# Patient Record
Sex: FEMALE | Race: WHITE | URBAN - METROPOLITAN AREA
[De-identification: names, ages, dates, MRNs, and addresses within clinical notes are randomized per-mention and may not be internally consistent; named-entity substitution may affect disease eponyms.]

---

## 2021-12-14 ENCOUNTER — EMERGENCY (EMERGENCY)
Facility: HOSPITAL | Age: 86
LOS: 0 days | Discharge: HOME | End: 2021-12-15
Attending: EMERGENCY MEDICINE | Admitting: EMERGENCY MEDICINE
Payer: MEDICARE

## 2021-12-14 VITALS
TEMPERATURE: 96 F | HEART RATE: 75 BPM | SYSTOLIC BLOOD PRESSURE: 204 MMHG | DIASTOLIC BLOOD PRESSURE: 77 MMHG | RESPIRATION RATE: 16 BRPM | OXYGEN SATURATION: 100 %

## 2021-12-14 DIAGNOSIS — D64.9 ANEMIA, UNSPECIFIED: ICD-10-CM

## 2021-12-14 DIAGNOSIS — R53.83 OTHER FATIGUE: ICD-10-CM

## 2021-12-14 DIAGNOSIS — H11.139 CONJUNCTIVAL PIGMENTATIONS, UNSPECIFIED EYE: ICD-10-CM

## 2021-12-14 DIAGNOSIS — R53.1 WEAKNESS: ICD-10-CM

## 2021-12-14 LAB
ABO RH CONFIRMATION: SIGNIFICANT CHANGE UP
ALBUMIN SERPL ELPH-MCNC: 3.3 G/DL — LOW (ref 3.5–5.2)
ALP SERPL-CCNC: 91 U/L — SIGNIFICANT CHANGE UP (ref 30–115)
ALT FLD-CCNC: 18 U/L — SIGNIFICANT CHANGE UP (ref 0–41)
ANION GAP SERPL CALC-SCNC: 15 MMOL/L — HIGH (ref 7–14)
AST SERPL-CCNC: 21 U/L — SIGNIFICANT CHANGE UP (ref 0–41)
BILIRUB SERPL-MCNC: 0.4 MG/DL — SIGNIFICANT CHANGE UP (ref 0.2–1.2)
BLD GP AB SCN SERPL QL: SIGNIFICANT CHANGE UP
BUN SERPL-MCNC: 33 MG/DL — HIGH (ref 10–20)
CALCIUM SERPL-MCNC: 8.5 MG/DL — SIGNIFICANT CHANGE UP (ref 8.5–10.1)
CHLORIDE SERPL-SCNC: 106 MMOL/L — SIGNIFICANT CHANGE UP (ref 98–110)
CO2 SERPL-SCNC: 20 MMOL/L — SIGNIFICANT CHANGE UP (ref 17–32)
CREAT SERPL-MCNC: 0.6 MG/DL — LOW (ref 0.7–1.5)
GLUCOSE SERPL-MCNC: 107 MG/DL — HIGH (ref 70–99)
HCT VFR BLD CALC: 24.6 % — LOW (ref 37–47)
HGB BLD-MCNC: 7.6 G/DL — LOW (ref 12–16)
MCHC RBC-ENTMCNC: 30.9 G/DL — LOW (ref 32–37)
MCHC RBC-ENTMCNC: 33.3 PG — HIGH (ref 27–31)
MCV RBC AUTO: 107.9 FL — HIGH (ref 81–99)
NRBC # BLD: 0 /100 WBCS — SIGNIFICANT CHANGE UP (ref 0–0)
PLATELET # BLD AUTO: 294 K/UL — SIGNIFICANT CHANGE UP (ref 130–400)
POTASSIUM SERPL-MCNC: 3.5 MMOL/L — SIGNIFICANT CHANGE UP (ref 3.5–5)
POTASSIUM SERPL-SCNC: 3.5 MMOL/L — SIGNIFICANT CHANGE UP (ref 3.5–5)
PROT SERPL-MCNC: 6.4 G/DL — SIGNIFICANT CHANGE UP (ref 6–8)
RBC # BLD: 2.28 M/UL — LOW (ref 4.2–5.4)
RBC # FLD: 18.4 % — HIGH (ref 11.5–14.5)
SODIUM SERPL-SCNC: 141 MMOL/L — SIGNIFICANT CHANGE UP (ref 135–146)
WBC # BLD: 5.14 K/UL — SIGNIFICANT CHANGE UP (ref 4.8–10.8)
WBC # FLD AUTO: 5.14 K/UL — SIGNIFICANT CHANGE UP (ref 4.8–10.8)

## 2021-12-14 PROCEDURE — 93010 ELECTROCARDIOGRAM REPORT: CPT

## 2021-12-14 PROCEDURE — 99285 EMERGENCY DEPT VISIT HI MDM: CPT

## 2021-12-14 NOTE — ED PROVIDER NOTE - PROGRESS NOTE DETAILS
Pt feeling much better - ambulating well in ed. eager to go home - discussed all results with pt. strict return precautions given, will follow up with pmd.

## 2021-12-14 NOTE — ED ADULT NURSE REASSESSMENT NOTE - NS ED NURSE REASSESS COMMENT FT1
Pt received from previous RN, no acute signs of distress pt is resting in bed m/a on call bell in reach awaiting blood transfusion

## 2021-12-14 NOTE — ED PROVIDER NOTE - NS ED ROS FT
Review of Systems:  	•	CONSTITUTIONAL - no fever, no diaphoresis, no chills  	•	SKIN - no rash  	•	HEMATOLOGIC - no bleeding, no bruising  	•	EYES - no eye pain, no blurry vision  	•	ENT - no change in hearing, no sore throat, no ear pain or tinnitus  	•	RESPIRATORY - no shortness of breath, no cough  	•	CARDIAC - no chest pain, no palpitations  	•	GI - no abd pain, no nausea, no vomiting, no diarrhea, no constipation  	•	GENITO-URINARY - no discharge, no dysuria; no hematuria, no increased urinary frequency  	•	MUSCULOSKELETAL - no joint paint, no swelling, no redness  	•	NEUROLOGIC - + weakness, no headache, no paresthesias, no LOC

## 2021-12-14 NOTE — ED PROVIDER NOTE - PATIENT PORTAL LINK FT
You can access the FollowMyHealth Patient Portal offered by [facility] by registering at the following website: [email or web address]. By joining [employer]’s FollowMyHealth portal, you will also be able to view your health information using other applications (apps) compatible with our system.

## 2021-12-14 NOTE — ED PROVIDER NOTE - OBJECTIVE STATEMENT
89 year old female with pmhx of chronic anemia presents for low Hb. pt had routine labs done and hb was 6.8. pt complaining of fatigue. no chest pain, sob, abd pain, nausea, vomiting, diarrhea, or bleeding.

## 2021-12-14 NOTE — ED PROVIDER NOTE - CLINICAL SUMMARY MEDICAL DECISION MAKING FREE TEXT BOX
89-year-old female presents to the ED for low hemoglobin and weakness.  Patient is a history of chronic anemia with a previous transfusion in July.  Patient denies any bloody stools, chest pain, shortness of breath.  Physical exam noted to have conjunctival pallor.  Obtain labs with hemoglobin of 7.9.  Transfused 1 unit PRBC.  Patient is well-appearing after transfusion, lungs clear bilaterally.  Discharged home.

## 2021-12-15 VITALS
OXYGEN SATURATION: 98 % | HEART RATE: 55 BPM | DIASTOLIC BLOOD PRESSURE: 76 MMHG | RESPIRATION RATE: 19 BRPM | TEMPERATURE: 97 F | SYSTOLIC BLOOD PRESSURE: 172 MMHG

## 2022-04-13 NOTE — ED PROVIDER NOTE - NS ED MD EM SELECTION
[id number] Detailed Propranolol Counseling:  I discussed with the patient the risks of propranolol including but not limited to low heart rate, low blood pressure, low blood sugar, restlessness and increased cold sensitivity. They should call the office if they experience any of these side effects.

## 2022-08-19 ENCOUNTER — EMERGENCY (EMERGENCY)
Facility: HOSPITAL | Age: 87
LOS: 0 days | Discharge: HOME | End: 2022-08-20
Attending: EMERGENCY MEDICINE | Admitting: EMERGENCY MEDICINE

## 2022-08-19 VITALS
RESPIRATION RATE: 20 BRPM | OXYGEN SATURATION: 96 % | DIASTOLIC BLOOD PRESSURE: 56 MMHG | TEMPERATURE: 98 F | HEART RATE: 77 BPM | SYSTOLIC BLOOD PRESSURE: 133 MMHG

## 2022-08-19 DIAGNOSIS — D64.9 ANEMIA, UNSPECIFIED: ICD-10-CM

## 2022-08-19 DIAGNOSIS — Z20.822 CONTACT WITH AND (SUSPECTED) EXPOSURE TO COVID-19: ICD-10-CM

## 2022-08-19 DIAGNOSIS — E78.5 HYPERLIPIDEMIA, UNSPECIFIED: ICD-10-CM

## 2022-08-19 DIAGNOSIS — I10 ESSENTIAL (PRIMARY) HYPERTENSION: ICD-10-CM

## 2022-08-19 LAB
ANION GAP SERPL CALC-SCNC: 10 MMOL/L — SIGNIFICANT CHANGE UP (ref 7–14)
ANISOCYTOSIS BLD QL: SIGNIFICANT CHANGE UP
APTT BLD: 34.7 SEC — SIGNIFICANT CHANGE UP (ref 27–39.2)
BASOPHILS # BLD AUTO: 0 K/UL — SIGNIFICANT CHANGE UP (ref 0–0.2)
BASOPHILS NFR BLD AUTO: 0 % — SIGNIFICANT CHANGE UP (ref 0–1)
BLD GP AB SCN SERPL QL: SIGNIFICANT CHANGE UP
BUN SERPL-MCNC: 27 MG/DL — HIGH (ref 10–20)
CALCIUM SERPL-MCNC: 8.1 MG/DL — LOW (ref 8.5–10.1)
CHLORIDE SERPL-SCNC: 109 MMOL/L — SIGNIFICANT CHANGE UP (ref 98–110)
CO2 SERPL-SCNC: 21 MMOL/L — SIGNIFICANT CHANGE UP (ref 17–32)
CREAT SERPL-MCNC: 1 MG/DL — SIGNIFICANT CHANGE UP (ref 0.7–1.5)
EGFR: 51 ML/MIN/1.73M2 — LOW
ELLIPTOCYTES BLD QL SMEAR: SLIGHT — SIGNIFICANT CHANGE UP
EOSINOPHIL # BLD AUTO: 0.04 K/UL — SIGNIFICANT CHANGE UP (ref 0–0.7)
EOSINOPHIL NFR BLD AUTO: 0.8 % — SIGNIFICANT CHANGE UP (ref 0–8)
GIANT PLATELETS BLD QL SMEAR: PRESENT — SIGNIFICANT CHANGE UP
GLUCOSE SERPL-MCNC: 95 MG/DL — SIGNIFICANT CHANGE UP (ref 70–99)
HCT VFR BLD CALC: 18.4 % — LOW (ref 37–47)
HGB BLD-MCNC: 5.9 G/DL — CRITICAL LOW (ref 12–16)
HYPOCHROMIA BLD QL: SIGNIFICANT CHANGE UP
INR BLD: 1.31 RATIO — HIGH (ref 0.65–1.3)
LYMPHOCYTES # BLD AUTO: 3.33 K/UL — SIGNIFICANT CHANGE UP (ref 1.2–3.4)
LYMPHOCYTES # BLD AUTO: 63.5 % — HIGH (ref 20.5–51.1)
MACROCYTES BLD QL: SIGNIFICANT CHANGE UP
MANUAL SMEAR VERIFICATION: SIGNIFICANT CHANGE UP
MCHC RBC-ENTMCNC: 32.1 G/DL — SIGNIFICANT CHANGE UP (ref 32–37)
MCHC RBC-ENTMCNC: 35.5 PG — HIGH (ref 27–31)
MCV RBC AUTO: 110.8 FL — HIGH (ref 81–99)
MONOCYTES # BLD AUTO: 0.05 K/UL — LOW (ref 0.1–0.6)
MONOCYTES NFR BLD AUTO: 0.9 % — LOW (ref 1.7–9.3)
NEUTROPHILS # BLD AUTO: 1.82 K/UL — SIGNIFICANT CHANGE UP (ref 1.4–6.5)
NEUTROPHILS NFR BLD AUTO: 34.8 % — LOW (ref 42.2–75.2)
OVALOCYTES BLD QL SMEAR: SLIGHT — SIGNIFICANT CHANGE UP
PLAT MORPH BLD: NORMAL — SIGNIFICANT CHANGE UP
PLATELET # BLD AUTO: 201 K/UL — SIGNIFICANT CHANGE UP (ref 130–400)
POIKILOCYTOSIS BLD QL AUTO: SIGNIFICANT CHANGE UP
POLYCHROMASIA BLD QL SMEAR: SIGNIFICANT CHANGE UP
POTASSIUM SERPL-MCNC: 3.8 MMOL/L — SIGNIFICANT CHANGE UP (ref 3.5–5)
POTASSIUM SERPL-SCNC: 3.8 MMOL/L — SIGNIFICANT CHANGE UP (ref 3.5–5)
PROTHROM AB SERPL-ACNC: 15 SEC — HIGH (ref 9.95–12.87)
RBC # BLD: 1.66 M/UL — LOW (ref 4.2–5.4)
RBC # FLD: 20.9 % — HIGH (ref 11.5–14.5)
RBC BLD AUTO: ABNORMAL
SCHISTOCYTES BLD QL AUTO: SLIGHT — SIGNIFICANT CHANGE UP
SODIUM SERPL-SCNC: 140 MMOL/L — SIGNIFICANT CHANGE UP (ref 135–146)
WBC # BLD: 5.24 K/UL — SIGNIFICANT CHANGE UP (ref 4.8–10.8)
WBC # FLD AUTO: 5.24 K/UL — SIGNIFICANT CHANGE UP (ref 4.8–10.8)

## 2022-08-19 PROCEDURE — 99285 EMERGENCY DEPT VISIT HI MDM: CPT

## 2022-08-19 NOTE — ED PROVIDER NOTE - CLINICAL SUMMARY MEDICAL DECISION MAKING FREE TEXT BOX
Patient evaluated for chronic anemia, Hb < 6, PRBCs ordered and patient placed in EDOU under transfusion protocol &  further evaluation.

## 2022-08-19 NOTE — ED ADULT TRIAGE NOTE - CHIEF COMPLAINT QUOTE
Pt send from [facility] for Low H/H of -5.5,pt denies fever ,denies chills N/V/D ,

## 2022-08-19 NOTE — ED PROVIDER NOTE - PROGRESS NOTE DETAILS
spoke with pt's nephew (healthcare proxy) aware she is in ED and confirms hx of anemmia with previous transfusions. Plan is for 2 units and repeat CBC later in am

## 2022-08-19 NOTE — ED PROVIDER NOTE - PHYSICAL EXAMINATION
VITAL SIGNS: I have reviewed nursing notes and confirm.  CONSTITUTIONAL: cachectic, chronically ill-appearing   SKIN: skin exam is warm and dry, no acute rash.    HEAD: Normocephalic; atraumatic.  EYES: conjunctiva and sclera clear.  ENT: No nasal discharge; airway clear.  NECK: Supple; non tender.  CARD: S1, S2 normal; no murmurs, gallops, or rubs. Regular rate and rhythm.   RESP: No wheezes, rales or rhonchi.  ABD: Normal bowel sounds; soft; non-distended; non-tender  EXT: Normal ROM.  No clubbing, cyanosis or edema.   NEURO: Alert, oriented, grossly unremarkable

## 2022-08-19 NOTE — ED PROVIDER NOTE - NS ED ROS FT
Cardiac:  No chest pain, SOB  Respiratory:  No cough or respiratory distress. No hemoptysis. No history of asthma or RAD.  GI:  No nausea, vomiting, diarrhea or abdominal pain.  :  No dysuria, frequency or burning.  MS:  No myalgia, muscle weakness, joint pain or back pain.  Neuro:  No headache or weakness.  No LOC.  Skin:  No skin rash.   Endocrine: No history of thyroid disease or diabetes.  Except as documented in the HPI,  all other systems are negative.

## 2022-08-19 NOTE — ED PROVIDER NOTE - NS ED ATTENDING STATEMENT MOD
This was a shared visit with the ANAHY. I reviewed and verified the documentation and independently performed the documented:

## 2022-08-19 NOTE — ED PROVIDER NOTE - ATTENDING APP SHARED VISIT CONTRIBUTION OF CARE
89-year-old female with PMH chronic anemia with history of transfusion sent from nursing home for transfusion for low hemoglobin.  Patient denies any complaints.  Reports feeling comfortable and without any pain.  No SOB, CP, dizziness, cough, V/D, fevers or chills.    VITAL SIGNS: noted  CONSTITUTIONAL: Well-developed; elderly female, in no acute distress  HEAD: Normocephalic; atraumatic  EYES: PERRL, EOM intact; conjunctiva and sclera clear  ENT: No nasal discharge; airway clear. MMM  NECK: Supple; non tender.    CARD: S1, S2 normal; no murmurs, gallops, or rubs. Regular rate and rhythm  RESP: CTAB/L, no wheezes, rales or rhonchi  ABD: Normal bowel sounds; soft; non-distended; non-tender  EXT: Normal ROM. No calf tenderness or edema. Distal pulses intact  NEURO: Alert, oriented. Grossly unremarkable. No focal deficits  SKIN: Skin exam is warm and dry, no acute rash

## 2022-08-19 NOTE — ED PROVIDER NOTE - OBJECTIVE STATEMENT
Pt is a 89y/o female with a pmhx of HTN, HLD, Anemia with previous transfusions here for blood transfusion after today's labs revealed a hemoglobin of 5.5. Pt denies CP, SOB, Palpitations

## 2022-08-20 VITALS
TEMPERATURE: 98 F | HEART RATE: 58 BPM | RESPIRATION RATE: 18 BRPM | SYSTOLIC BLOOD PRESSURE: 155 MMHG | OXYGEN SATURATION: 98 % | DIASTOLIC BLOOD PRESSURE: 70 MMHG

## 2022-08-20 LAB
HCT VFR BLD CALC: 26.2 % — LOW (ref 37–47)
HGB BLD-MCNC: 8.7 G/DL — LOW (ref 12–16)
MCHC RBC-ENTMCNC: 32.7 PG — HIGH (ref 27–31)
MCHC RBC-ENTMCNC: 33.2 G/DL — SIGNIFICANT CHANGE UP (ref 32–37)
MCV RBC AUTO: 98.5 FL — SIGNIFICANT CHANGE UP (ref 81–99)
NRBC # BLD: 0 /100 WBCS — SIGNIFICANT CHANGE UP (ref 0–0)
PLATELET # BLD AUTO: 165 K/UL — SIGNIFICANT CHANGE UP (ref 130–400)
RBC # BLD: 2.66 M/UL — LOW (ref 4.2–5.4)
RBC # FLD: 22.7 % — HIGH (ref 11.5–14.5)
SARS-COV-2 RNA SPEC QL NAA+PROBE: SIGNIFICANT CHANGE UP
WBC # BLD: 5.12 K/UL — SIGNIFICANT CHANGE UP (ref 4.8–10.8)
WBC # FLD AUTO: 5.12 K/UL — SIGNIFICANT CHANGE UP (ref 4.8–10.8)

## 2022-08-20 PROCEDURE — 99218: CPT

## 2022-08-20 NOTE — ED CDU PROVIDER DISPOSITION NOTE - PATIENT PORTAL LINK FT
You can access the FollowMyHealth Patient Portal offered by [facility] by registering at the following website: [email or web address]. By joining [employer]’s FollowMyHealth portal, you will also be able to view your health information using other applications (apps) compatible with our system.

## 2022-08-20 NOTE — ED CDU PROVIDER INITIAL DAY NOTE - PROGRESS NOTE DETAILS
received signout from Newton Byrne pt in obs for transfusion of blood; pt received 2 units; will repeat cbc and d/c if improved in am;

## 2022-08-20 NOTE — ED CDU PROVIDER DISPOSITION NOTE - NSFOLLOWUPINSTRUCTIONS_ED_ALL_ED_FT
Anemia    Anemia is a condition in which the concentration of red blood cells or hemoglobin in the blood is below normal. Hemoglobin is a substance in red blood cells that carries oxygen to the tissues of the body. Anemia results in not enough oxygen reaching these tissues which can cause symptoms such as weakness, dizziness/lightheadedness, shortness of breath, chest pain, paleness, or nausea.    SEEK IMMEDIATE MEDICAL CARE IF YOU HAVE THE FOLLOWING SYMPTOMS: extreme weakness/chest pain/shortness of breath, black or bloody stools, vomiting blood, fainting, fever, or any signs of dehydration.      Blood Transfusion    WHAT YOU NEED TO KNOW:    A blood transfusion is used to give you blood through an IV. You may get only part of the blood, such as red blood cells, platelets, or plasma. The blood may be from you and stored for you to use later. The blood may instead be from another person. Donated blood is tested for HIV, hepatitis, syphilis, West Nile virus, and other diseases.    DISCHARGE INSTRUCTIONS:    Call 911 for any of the following:     You have a skin rash, hives, swelling, or itching.       You have trouble breathing, shortness of breath, wheezing, or coughing.      Your throat tightens or your lips or tongue swell.      You have difficulty swallowing or speaking.    Seek care immediately if:     You develop a high fever and chills.       You are dizzy, lightheaded, confused, or feel like you are going to faint.      You have nausea, diarrhea, or abdominal cramps, or you are vomiting.      You urinate little or not at all.      You develop headaches or double vision.      Your skin or the whites of your eyes look yellow.      You see pinpoint purple spots or purple patches on your body.       You have a seizure.     Contact your healthcare provider if:     You feel tired and weak within 10 days of your transfusion.      You have questions or concerns about blood transfusions.    Medicines:     Antihistamines may help stop mild itching or a rash.      Epinephrine is emergency medicine used to stop anaphylaxis. You may be given epinephrine if you are at risk for anaphylaxis. Your healthcare provider will teach you how to use it.      Take your medicine as directed. Contact your healthcare provider if you think your medicine is not helping or if you have side effects. Tell him or her if you are allergic to any medicine. Keep a list of the medicines, vitamins, and herbs you take. Include the amounts, and when and why you take them. Bring the list or the pill bottles to follow-up visits. Carry your medicine list with you in case of an emergency.    Apply ice to decrease pain and swelling. Use an ice pack, or put ice in a plastic bag and wrap a towel around it. Apply the ice pack or wrapped bag to your transfusion site for 20 minutes each hour or as directed.     Follow up with your healthcare provider as directed: Write down your questions so you remember to ask them during your visits.       © Copyright [employer] 2019 All illustrations and images included in CareNotes are the copyrighted property of [employer]M., Inc[employer] or [employer].

## 2022-08-20 NOTE — ED CDU PROVIDER DISPOSITION NOTE - CARE PROVIDER_API CALL
Tang Vargas (DO)  Internal Medicine  [address] [place] [address]  Phone: [phone]  Fax: [phone]  Established Patient  Follow Up Time: 4-6 Days

## 2022-08-20 NOTE — ED CDU PROVIDER DISPOSITION NOTE - CLINICAL COURSE
Patient evaluated for anemia, placed in EOU for transfusion PRBCs.  Received PRBC transfusion without issue.  Patient without any complaints.  Advised close follow-up with PMD, strict return precautions advised. Patient evaluated for anemia, placed in EDOU for transfusion PRBCs.  Received PRBC transfusion without issue.  Patient without any complaints.  Advised close follow-up with PMD, strict return precautions advised.

## 2022-09-04 ENCOUNTER — INPATIENT (INPATIENT)
Facility: HOSPITAL | Age: 87
LOS: 1 days | Discharge: NURSING, REHAB OR LONG-TERM CARE | End: 2022-09-06
Attending: HOSPITALIST | Admitting: HOSPITALIST

## 2022-09-04 VITALS
TEMPERATURE: 98 F | HEIGHT: 61 IN | RESPIRATION RATE: 18 BRPM | SYSTOLIC BLOOD PRESSURE: 134 MMHG | DIASTOLIC BLOOD PRESSURE: 58 MMHG | HEART RATE: 62 BPM | OXYGEN SATURATION: 96 % | WEIGHT: 115.08 LBS

## 2022-09-04 LAB
ALBUMIN SERPL ELPH-MCNC: 2.7 G/DL — LOW (ref 3.5–5.2)
ALP SERPL-CCNC: 71 U/L — SIGNIFICANT CHANGE UP (ref 30–115)
ALT FLD-CCNC: 17 U/L — SIGNIFICANT CHANGE UP (ref 0–41)
ANION GAP SERPL CALC-SCNC: 7 MMOL/L — SIGNIFICANT CHANGE UP (ref 7–14)
ANISOCYTOSIS BLD QL: SIGNIFICANT CHANGE UP
APPEARANCE UR: CLEAR — SIGNIFICANT CHANGE UP
APTT BLD: 25 SEC — LOW (ref 27–39.2)
AST SERPL-CCNC: 26 U/L — SIGNIFICANT CHANGE UP (ref 0–41)
BASOPHILS # BLD AUTO: 0 K/UL — SIGNIFICANT CHANGE UP (ref 0–0.2)
BASOPHILS NFR BLD AUTO: 0 % — SIGNIFICANT CHANGE UP (ref 0–1)
BILIRUB SERPL-MCNC: 0.7 MG/DL — SIGNIFICANT CHANGE UP (ref 0.2–1.2)
BILIRUB UR-MCNC: NEGATIVE — SIGNIFICANT CHANGE UP
BLD GP AB SCN SERPL QL: SIGNIFICANT CHANGE UP
BUN SERPL-MCNC: 22 MG/DL — HIGH (ref 10–20)
CALCIUM SERPL-MCNC: 8.1 MG/DL — LOW (ref 8.5–10.1)
CHLORIDE SERPL-SCNC: 107 MMOL/L — SIGNIFICANT CHANGE UP (ref 98–110)
CO2 SERPL-SCNC: 22 MMOL/L — SIGNIFICANT CHANGE UP (ref 17–32)
COLOR SPEC: YELLOW — SIGNIFICANT CHANGE UP
CREAT SERPL-MCNC: 0.5 MG/DL — LOW (ref 0.7–1.5)
DIFF PNL FLD: NEGATIVE — SIGNIFICANT CHANGE UP
EGFR: 85 ML/MIN/1.73M2 — SIGNIFICANT CHANGE UP
ELLIPTOCYTES BLD QL SMEAR: SLIGHT — SIGNIFICANT CHANGE UP
EOSINOPHIL # BLD AUTO: 0.05 K/UL — SIGNIFICANT CHANGE UP (ref 0–0.7)
EOSINOPHIL NFR BLD AUTO: 0.9 % — SIGNIFICANT CHANGE UP (ref 0–8)
GIANT PLATELETS BLD QL SMEAR: PRESENT — SIGNIFICANT CHANGE UP
GLUCOSE SERPL-MCNC: 87 MG/DL — SIGNIFICANT CHANGE UP (ref 70–99)
GLUCOSE UR QL: NEGATIVE — SIGNIFICANT CHANGE UP
HCT VFR BLD CALC: 22.1 % — LOW (ref 37–47)
HGB BLD-MCNC: 7.2 G/DL — LOW (ref 12–16)
INR BLD: 1.33 RATIO — HIGH (ref 0.65–1.3)
KETONES UR-MCNC: NEGATIVE — SIGNIFICANT CHANGE UP
LACTATE SERPL-SCNC: 1.9 MMOL/L — SIGNIFICANT CHANGE UP (ref 0.7–2)
LEUKOCYTE ESTERASE UR-ACNC: NEGATIVE — SIGNIFICANT CHANGE UP
LYMPHOCYTES # BLD AUTO: 1.73 K/UL — SIGNIFICANT CHANGE UP (ref 1.2–3.4)
LYMPHOCYTES # BLD AUTO: 31.3 % — SIGNIFICANT CHANGE UP (ref 20.5–51.1)
MACROCYTES BLD QL: SLIGHT — SIGNIFICANT CHANGE UP
MANUAL SMEAR VERIFICATION: SIGNIFICANT CHANGE UP
MCHC RBC-ENTMCNC: 32.6 G/DL — SIGNIFICANT CHANGE UP (ref 32–37)
MCHC RBC-ENTMCNC: 33.5 PG — HIGH (ref 27–31)
MCV RBC AUTO: 102.8 FL — HIGH (ref 81–99)
METAMYELOCYTES # FLD: 22.6 % — HIGH (ref 0–0)
MONOCYTES # BLD AUTO: 0.53 K/UL — SIGNIFICANT CHANGE UP (ref 0.1–0.6)
MONOCYTES NFR BLD AUTO: 9.5 % — HIGH (ref 1.7–9.3)
NEUTROPHILS # BLD AUTO: 1.92 K/UL — SIGNIFICANT CHANGE UP (ref 1.4–6.5)
NEUTROPHILS NFR BLD AUTO: 23.5 % — LOW (ref 42.2–75.2)
NEUTS BAND # BLD: 11.3 % — HIGH (ref 0–6)
NITRITE UR-MCNC: NEGATIVE — SIGNIFICANT CHANGE UP
OVALOCYTES BLD QL SMEAR: SLIGHT — SIGNIFICANT CHANGE UP
PH UR: 6 — SIGNIFICANT CHANGE UP (ref 5–8)
PLAT MORPH BLD: NORMAL — SIGNIFICANT CHANGE UP
PLATELET # BLD AUTO: 209 K/UL — SIGNIFICANT CHANGE UP (ref 130–400)
POIKILOCYTOSIS BLD QL AUTO: SLIGHT — SIGNIFICANT CHANGE UP
POLYCHROMASIA BLD QL SMEAR: SLIGHT — SIGNIFICANT CHANGE UP
POTASSIUM SERPL-MCNC: 4.7 MMOL/L — SIGNIFICANT CHANGE UP (ref 3.5–5)
POTASSIUM SERPL-SCNC: 4.7 MMOL/L — SIGNIFICANT CHANGE UP (ref 3.5–5)
PROT SERPL-MCNC: 6.5 G/DL — SIGNIFICANT CHANGE UP (ref 6–8)
PROT UR-MCNC: SIGNIFICANT CHANGE UP
PROTHROM AB SERPL-ACNC: 15.2 SEC — HIGH (ref 9.95–12.87)
RBC # BLD: 2.15 M/UL — LOW (ref 4.2–5.4)
RBC # FLD: 22.3 % — HIGH (ref 11.5–14.5)
RBC BLD AUTO: ABNORMAL
SARS-COV-2 RNA SPEC QL NAA+PROBE: SIGNIFICANT CHANGE UP
SMUDGE CELLS # BLD: PRESENT — SIGNIFICANT CHANGE UP
SODIUM SERPL-SCNC: 136 MMOL/L — SIGNIFICANT CHANGE UP (ref 135–146)
SP GR SPEC: 1.02 — SIGNIFICANT CHANGE UP (ref 1.01–1.03)
TARGETS BLD QL SMEAR: SLIGHT — SIGNIFICANT CHANGE UP
UROBILINOGEN FLD QL: ABNORMAL
VARIANT LYMPHS # BLD: 0.9 % — SIGNIFICANT CHANGE UP (ref 0–5)
WBC # BLD: 5.53 K/UL — SIGNIFICANT CHANGE UP (ref 4.8–10.8)
WBC # FLD AUTO: 5.53 K/UL — SIGNIFICANT CHANGE UP (ref 4.8–10.8)

## 2022-09-04 PROCEDURE — 99285 EMERGENCY DEPT VISIT HI MDM: CPT

## 2022-09-04 PROCEDURE — 71045 X-RAY EXAM CHEST 1 VIEW: CPT | Mod: 26

## 2022-09-04 PROCEDURE — 99223 1ST HOSP IP/OBS HIGH 75: CPT

## 2022-09-04 RX ORDER — GABAPENTIN 400 MG/1
100 CAPSULE ORAL THREE TIMES A DAY
Refills: 0 | Status: DISCONTINUED | OUTPATIENT
Start: 2022-09-04 | End: 2022-09-06

## 2022-09-04 RX ORDER — CALCIUM CARBONATE 500(1250)
1 TABLET ORAL DAILY
Refills: 0 | Status: DISCONTINUED | OUTPATIENT
Start: 2022-09-04 | End: 2022-09-06

## 2022-09-04 RX ORDER — ACETAMINOPHEN 500 MG
650 TABLET ORAL EVERY 6 HOURS
Refills: 0 | Status: DISCONTINUED | OUTPATIENT
Start: 2022-09-04 | End: 2022-09-06

## 2022-09-04 RX ORDER — ASCORBIC ACID 60 MG
500 TABLET,CHEWABLE ORAL DAILY
Refills: 0 | Status: DISCONTINUED | OUTPATIENT
Start: 2022-09-04 | End: 2022-09-06

## 2022-09-04 RX ORDER — LOSARTAN POTASSIUM 100 MG/1
50 TABLET, FILM COATED ORAL DAILY
Refills: 0 | Status: DISCONTINUED | OUTPATIENT
Start: 2022-09-04 | End: 2022-09-06

## 2022-09-04 RX ORDER — DORZOLAMIDE HYDROCHLORIDE 20 MG/ML
2 SOLUTION/ DROPS OPHTHALMIC THREE TIMES A DAY
Refills: 0 | Status: DISCONTINUED | OUTPATIENT
Start: 2022-09-04 | End: 2022-09-06

## 2022-09-04 RX ORDER — PANTOPRAZOLE SODIUM 20 MG/1
40 TABLET, DELAYED RELEASE ORAL
Refills: 0 | Status: DISCONTINUED | OUTPATIENT
Start: 2022-09-04 | End: 2022-09-06

## 2022-09-04 RX ORDER — SENNA PLUS 8.6 MG/1
1 TABLET ORAL AT BEDTIME
Refills: 0 | Status: DISCONTINUED | OUTPATIENT
Start: 2022-09-04 | End: 2022-09-06

## 2022-09-04 RX ORDER — ATORVASTATIN CALCIUM 80 MG/1
80 TABLET, FILM COATED ORAL AT BEDTIME
Refills: 0 | Status: DISCONTINUED | OUTPATIENT
Start: 2022-09-04 | End: 2022-09-06

## 2022-09-04 RX ORDER — LACTULOSE 10 G/15ML
10 SOLUTION ORAL DAILY
Refills: 0 | Status: DISCONTINUED | OUTPATIENT
Start: 2022-09-04 | End: 2022-09-06

## 2022-09-04 RX ORDER — POLYETHYLENE GLYCOL 3350 17 G/17G
17 POWDER, FOR SOLUTION ORAL DAILY
Refills: 0 | Status: DISCONTINUED | OUTPATIENT
Start: 2022-09-04 | End: 2022-09-06

## 2022-09-04 RX ORDER — FOLIC ACID 0.8 MG
1 TABLET ORAL DAILY
Refills: 0 | Status: DISCONTINUED | OUTPATIENT
Start: 2022-09-04 | End: 2022-09-06

## 2022-09-04 NOTE — ED PROVIDER NOTE - CLINICAL SUMMARY MEDICAL DECISION MAKING FREE TEXT BOX
Symptomatic anemia with bandemia of 11%.  No signs of focal infection.  Pan culture and admit for surveillance.  Stable.

## 2022-09-04 NOTE — ED ADULT TRIAGE NOTE - CHIEF COMPLAINT QUOTE
Pt referred to ED for [facility] blood transfusion. Hgb is currently 6.8. Pt has no complaints. No external evidence of bleeding

## 2022-09-04 NOTE — H&P ADULT - ATTENDING COMMENTS
89 YO F with a PMH of chronic anemia on procrit s/p multiple transfusions, HTN, HLD, and GERD who was sent in from her NH ([place]) for eval of low Hgb on out-pt bloodwork. The pt denies all symptoms. No black/bloody stools, no hematuria, no excessive bruising, and no hematemesis. ROS is negative except as above.     In the ED, Hgb is 7.2.     FMHx: Reviewed, not relevant    Physical exam shows pt in NAD. VSS, afebrile, not hypoxic on RA. A&Ox3. Neuro exam without deficits, motor/sensory intact, no dysarthria, no facial asymmetry. Muscle strength/sensation intact. CTA B/L with no W/C/R. RRR, no M/G/R. ABD is soft and non-tender, normoactive BSs. LEs without swelling. No rashes. Labs and radiology as above.     Macrocytic anemia, slightly below baseline. Send B12/Folate levels. Anemia work-up. Send peripheral smear. FOBT. Active T&S. Replace PRN. Heme/Onc consult.     Hypoalbuminemia, from poor oral intake. Nutrition eval.     Hx of HTN, HLD, and GERD. Restart home meds, except as stated above. DVT PPX. Inform PCP of pt's admission to hospital. My note supersedes the residents note.     Date seen by Attendin22

## 2022-09-04 NOTE — ED ADULT NURSE NOTE - NSIMPLEMENTINTERV_GEN_ALL_ED
Implemented All Fall with Harm Risk Interventions:  [place] to call system. Call bell, personal items and telephone within reach. Instruct patient to call for assistance. Room bathroom lighting operational. Non-slip footwear when patient is off stretcher. Physically safe environment: no spills, clutter or unnecessary equipment. Stretcher in lowest position, wheels locked, appropriate side rails in place. Provide visual cue, wrist band, yellow gown, etc. Monitor gait and stability. Monitor for mental status changes and reorient to person, place, and time. Review medications for side effects contributing to fall risk. Reinforce activity limits and safety measures with patient and family. Provide visual clues: red socks.

## 2022-09-04 NOTE — H&P ADULT - NSHPLABSRESULTS_GEN_ALL_CORE
7.2    5.53  )-----------( 209      ( 04 Sep 2022 17:40 )             22.1           136  |  107  |  22<H>  ----------------------------<  87  4.7   |  22  |  0.5<L>    Ca    8.1<L>      04 Sep 2022 17:40    TPro  6.5  /  Alb  2.7<L>  /  TBili  0.7  /  DBili  x   /  AST  26  /  ALT  17  /  AlkPhos  71                Urinalysis Basic - ( 04 Sep 2022 22:14 )    Color: Yellow / Appearance: Clear / S.020 / pH: x  Gluc: x / Ketone: Negative  / Bili: Negative / Urobili: 3 mg/dL   Blood: x / Protein: Trace / Nitrite: Negative   Leuk Esterase: Negative / RBC: x / WBC x   Sq Epi: x / Non Sq Epi: x / Bacteria: x        PT/INR - ( 04 Sep 2022 21:00 )   PT: 15.20 sec;   INR: 1.33 ratio         PTT - ( 04 Sep 2022 21:00 )  PTT:25.0 sec    Lactate Trend   @ 21:00 Lactate:1.9             CAPILLARY BLOOD GLUCOSE

## 2022-09-04 NOTE — H&P ADULT - ASSESSMENT
#Anemia Blood loss vs hemolytic vs hypopliferative (myelodysplasia has bands in blood)  - H/H: on admission 7.2   - Iron profile  - B12 folate  - Hemolytic panel  - Indirect genesis, and stacy  - ABD US for hepatosplenomegaly   - FOBT  - trend CBC with diff  - maintain active T and S  - Transfuse prn (Hgb>8)   - Monitor for signs and symptoms of bleeding  - GI eval  - PPI BID  - NPO for now         DVT PPX: SCD for now  Diet: NPO for now  GI PPX: pantoprazole   Dispo: acute 89-year-old female with history of chronic anemia, HTN, HLD, GERD previous blood transfusions in the past presents for hemoglobin of 6.8 found on outpatient lab work from nursing home. Patient recently came to the ER was given a transfusion and sent home.  Patient does not have any complaints.  Denies any bleeding hematuria blood in stool abdominal pain nausea vomiting black or tarry stool dizziness lightheadedness syncope or shortness of breath.      #Anemia Blood loss vs hemolytic vs hypopliferative (myelodysplasia has bands in blood)  - H/H: on admission 7.2   - Iron profile  - B12 folate  - Hemolytic panel  - Indirect genesis, and stacy  - ABD US for hepatosplenomegaly   - FOBT  - trend CBC with diff  - maintain active T and S  - Transfuse prn (Hgb>8)   - Monitor for signs and symptoms of bleeding  - GI eval  - PPI BID  - NPO for now   - Peripheral smear    #Severe bandemia  - pan culture  - procal   - monitor off abx  - Heme onc consult if no apparent cause found to R/O MDS    #HLD  - lipitor 80 mg PO QD        DVT PPX: SCD for now  Diet: NPO for now  GI PPX: pantoprazole   Dispo: acute 89-year-old female with history of chronic anemia, HTN, HLD, GERD previous blood transfusions in the past presents for hemoglobin of 6.8 found on outpatient lab work from nursing home. Patient recently came to the ER was given a transfusion and sent home.  Patient does not have any complaints.  Denies any bleeding hematuria blood in stool abdominal pain nausea vomiting black or tarry stool dizziness lightheadedness syncope or shortness of breath.      #Anemia Blood loss vs hemolytic vs hypopliferative (myelodysplasia has bands in blood)  - H/H: on admission 7.2   - Iron profile  - B12 folate  - Hemolytic panel  - Indirect genesis, and stacy  - ABD US for hepatosplenomegaly   - FOBT  - trend CBC with diff  - maintain active T and S  - Transfuse prn (Hgb>8)   - Monitor for signs and symptoms of bleeding  - GI eval  - PPI BID  - NPO for now   - Peripheral smear    #Severe bandemia  - pan culture  - procal   - monitor off abx  - Heme onc consult if no apparent cause found to R/O MDS    #HLD  - lipitor 80 mg PO QD    Dietitian eval when stable has low Albumin      DVT PPX: SCD for now  Diet: NPO for now  GI PPX: pantoprazole   Dispo: acute 89-year-old female with history of chronic anemia, HTN, HLD, GERD previous blood transfusions in the past presents for hemoglobin of 6.8 found on outpatient lab work from nursing home. Patient recently came to the ER was given a transfusion and sent home.  Patient does not have any complaints.  Denies any bleeding hematuria blood in stool abdominal pain nausea vomiting black or tarry stool dizziness lightheadedness syncope or shortness of breath.      #Anemia Blood loss vs hemolytic vs hypopliferative (myelodysplasia has bands in blood)  - H/H: on admission 7.2   - Iron profile  - B12 folate  - Hemolytic panel  - Indirect genesis, and stacy  - ABD US for hepatosplenomegaly   - FOBT  - trend CBC with diff  - maintain active T and S  - Transfuse prn (Hgb>8)   - Monitor for signs and symptoms of bleeding  - GI eval  - PPI BID  - NPO for now   - Peripheral smear  - 2 large bores    #Severe bandemia  - pan culture  - procal   - monitor off abx  - Heme onc consult if no apparent cause found to R/O MDS    #HLD  - lipitor 80 mg PO QD    Dietitian eval when stable has low Albumin      DVT PPX: SCD for now  Diet: NPO for now  GI PPX: pantoprazole   Dispo: acute

## 2022-09-04 NOTE — ED PROVIDER NOTE - ATTENDING APP SHARED VISIT CONTRIBUTION OF CARE
89-year-old female with a past medical history of anemia requiring multiple transfusions, hypertension, hyperlipidemia sent in for transfusion given hemoglobin 6.8 at nursing home blood work.  Patient has absolutely no complaints and denies any bleeding history.  On exam nontoxic, vital signs stable, heart regular with systolic murmur, lungs clear bilaterally, no peripheral edema, hgb 7.2 here.  We will transfuse.  CBC otherwise with 11% bands.  No fever or signs of infection clinically but will admit for this for further monitoring.

## 2022-09-04 NOTE — ED PROVIDER NOTE - PHYSICAL EXAMINATION
CONST: Well appearing in NAD  EYES: PERRL, EOMI, Sclera and conjunctiva clear. Vision grossly intact  NECK: Non-tender, no meningeal signs  CARD: Normal S1 S2; Normal rate and rhythm  RESP: Equal BS B/L, No wheezes, rhonchi or rales. No distress  GI: Soft, non-tender, non-distended. No rebound or Guarding  MS: Normal ROM in all extremities. No midline spinal tenderness.  SKIN: Warm, dry, no acute rashes. Good turgor  NEURO: A&Ox3, No focal deficits. Strength 5/5 with no sensory deficits. Steady gait

## 2022-09-04 NOTE — H&P ADULT - NSHPPHYSICALEXAM_GEN_ALL_CORE
PHYSICAL EXAM:  GENERAL: NAD, lying in bed comfortably  HEAD:  Atraumatic, Normocephalic  EYES: EOMI, PERRLA, conjunctiva and sclera clear  ENT: Moist mucous membranes  NECK: Supple, No JVD  CHEST/LUNG: Clear to auscultation bilaterally; No rales, rhonchi, wheezing, or rubs. Unlabored respirations  HEART: Regular rate and rhythm; No murmurs, rubs, or gallops  ABDOMEN: Bowel sounds present; Soft, Nontender, Nondistended. No hepatomegally  EXTREMITIES:  2+ Peripheral Pulses, brisk capillary refill. No clubbing, cyanosis, or edema  NERVOUS SYSTEM:  Alert & Oriented X3, speech clear. No deficits   MSK: FROM all 4 extremities, full and equal strength  SKIN: No rashes or lesions PHYSICAL EXAM:  GENERAL: NAD, lying in bed comfortably  HEAD:  Atraumatic, Normocephalic  EYES: EOMI, PERRLA, conjunctiva and sclera clear  ENT: Moist mucous membranes  NECK: Supple, No JVD  CHEST/LUNG: Clear to auscultation bilaterally; No rales, rhonchi, wheezing, or rubs. Unlabored respirations  HEART: systolic murmur 4/6 radiates throughout the precordium  ABDOMEN: Bowel sounds present; Soft, Nontender, Nondistended. No hepatomegally  EXTREMITIES:  No clubbing, cyanosis, or edema  NERVOUS SYSTEM:  Alert & Oriented X3, speech clear. No deficits   MSK: FROM all 4 extremities, full and equal strength  did not want DELANO

## 2022-09-04 NOTE — H&P ADULT - HISTORY OF PRESENT ILLNESS
89-year-old female with history of chronic anemia, previous blood transfusions in the past presents for hemoglobin of 6.8 found on outpatient lab work from nursing home.  Patient with no complaints.  Denies any bleeding hematuria blood in stool abdominal pain nausea vomiting black or tarry stool dizziness lightheadedness syncope or shortness of breath 89-year-old female with history of chronic anemia on procrit, HTN, HLD, GERD previous blood transfusions in the past presents for hemoglobin of 6.8 found on outpatient lab work from nursing home. Patient recently came to the ER was given a transfusion and sent home.  Patient does not have any complaints.  Denies any bleeding hematuria blood in stool abdominal pain nausea vomiting black or tarry stool dizziness lightheadedness syncope or shortness of breath.    In the ED patient was found to have a Hgb of 7.2.

## 2022-09-04 NOTE — ED ADULT NURSE NOTE - CHIEF COMPLAINT QUOTE
Pt referred to ED for [facility] blood transfusion. Hgb is currently 6.8. Pt has no complaints. No external evidence of bleeding

## 2022-09-04 NOTE — ED PROVIDER NOTE - NS ED ROS FT
CONST: No fever, chills or bodyaches  EYES: No pain, redness, drainage or visual changes.  CARD: No chest pain, palpitations  RESP: No SOB, cough, hemoptysis. No hx of asthma or COPD  GI: No abdominal pain, N/V/D  : No urinary symptoms  MS: No joint pain, back pain or extremity pain/injury  SKIN: No rashes  NEURO: No headache, dizziness, paresthesias or LOC

## 2022-09-05 ENCOUNTER — TRANSCRIPTION ENCOUNTER (OUTPATIENT)
Age: 87
End: 2022-09-05

## 2022-09-05 LAB
ALBUMIN SERPL ELPH-MCNC: 2.5 G/DL — LOW (ref 3.5–5.2)
ALBUMIN SERPL ELPH-MCNC: 2.5 G/DL — LOW (ref 3.5–5.2)
ALBUMIN SERPL ELPH-MCNC: 2.6 G/DL — LOW (ref 3.5–5.2)
ALP SERPL-CCNC: 68 U/L — SIGNIFICANT CHANGE UP (ref 30–115)
ALP SERPL-CCNC: 74 U/L — SIGNIFICANT CHANGE UP (ref 30–115)
ALP SERPL-CCNC: 75 U/L — SIGNIFICANT CHANGE UP (ref 30–115)
ALT FLD-CCNC: 15 U/L — SIGNIFICANT CHANGE UP (ref 0–41)
ANION GAP SERPL CALC-SCNC: 6 MMOL/L — LOW (ref 7–14)
ANION GAP SERPL CALC-SCNC: 8 MMOL/L — SIGNIFICANT CHANGE UP (ref 7–14)
ANION GAP SERPL CALC-SCNC: 9 MMOL/L — SIGNIFICANT CHANGE UP (ref 7–14)
AST SERPL-CCNC: 16 U/L — SIGNIFICANT CHANGE UP (ref 0–41)
AST SERPL-CCNC: 17 U/L — SIGNIFICANT CHANGE UP (ref 0–41)
AST SERPL-CCNC: 23 U/L — SIGNIFICANT CHANGE UP (ref 0–41)
BASOPHILS # BLD AUTO: 0.01 K/UL — SIGNIFICANT CHANGE UP (ref 0–0.2)
BASOPHILS # BLD AUTO: 0.01 K/UL — SIGNIFICANT CHANGE UP (ref 0–0.2)
BASOPHILS NFR BLD AUTO: 0.2 % — SIGNIFICANT CHANGE UP (ref 0–1)
BASOPHILS NFR BLD AUTO: 0.2 % — SIGNIFICANT CHANGE UP (ref 0–1)
BILIRUB DIRECT SERPL-MCNC: 0.3 MG/DL — SIGNIFICANT CHANGE UP (ref 0–0.3)
BILIRUB SERPL-MCNC: 0.6 MG/DL — SIGNIFICANT CHANGE UP (ref 0.2–1.2)
BILIRUB SERPL-MCNC: 0.7 MG/DL — SIGNIFICANT CHANGE UP (ref 0.2–1.2)
BILIRUB SERPL-MCNC: 0.8 MG/DL — SIGNIFICANT CHANGE UP (ref 0.2–1.2)
BUN SERPL-MCNC: 19 MG/DL — SIGNIFICANT CHANGE UP (ref 10–20)
BUN SERPL-MCNC: 20 MG/DL — SIGNIFICANT CHANGE UP (ref 10–20)
BUN SERPL-MCNC: 22 MG/DL — HIGH (ref 10–20)
CALCIUM SERPL-MCNC: 7.4 MG/DL — LOW (ref 8.5–10.1)
CALCIUM SERPL-MCNC: 7.6 MG/DL — LOW (ref 8.5–10.1)
CALCIUM SERPL-MCNC: 7.7 MG/DL — LOW (ref 8.5–10.1)
CHLORIDE SERPL-SCNC: 108 MMOL/L — SIGNIFICANT CHANGE UP (ref 98–110)
CO2 SERPL-SCNC: 20 MMOL/L — SIGNIFICANT CHANGE UP (ref 17–32)
CO2 SERPL-SCNC: 22 MMOL/L — SIGNIFICANT CHANGE UP (ref 17–32)
CO2 SERPL-SCNC: 23 MMOL/L — SIGNIFICANT CHANGE UP (ref 17–32)
CREAT SERPL-MCNC: 0.5 MG/DL — LOW (ref 0.7–1.5)
CREAT SERPL-MCNC: 0.5 MG/DL — LOW (ref 0.7–1.5)
CREAT SERPL-MCNC: 0.6 MG/DL — LOW (ref 0.7–1.5)
DIR ANTIGLOB POLYSPECIFIC INTERPRETATION: SIGNIFICANT CHANGE UP
EGFR: 81 ML/MIN/1.73M2 — SIGNIFICANT CHANGE UP
EGFR: 85 ML/MIN/1.73M2 — SIGNIFICANT CHANGE UP
EGFR: 85 ML/MIN/1.73M2 — SIGNIFICANT CHANGE UP
EOSINOPHIL # BLD AUTO: 0.08 K/UL — SIGNIFICANT CHANGE UP (ref 0–0.7)
EOSINOPHIL # BLD AUTO: 0.1 K/UL — SIGNIFICANT CHANGE UP (ref 0–0.7)
EOSINOPHIL NFR BLD AUTO: 1.7 % — SIGNIFICANT CHANGE UP (ref 0–8)
EOSINOPHIL NFR BLD AUTO: 2 % — SIGNIFICANT CHANGE UP (ref 0–8)
FERRITIN SERPL-MCNC: 1575 NG/ML — HIGH (ref 15–150)
FOLATE SERPL-MCNC: >20 NG/ML — SIGNIFICANT CHANGE UP
GLUCOSE SERPL-MCNC: 106 MG/DL — HIGH (ref 70–99)
GLUCOSE SERPL-MCNC: 132 MG/DL — HIGH (ref 70–99)
GLUCOSE SERPL-MCNC: 87 MG/DL — SIGNIFICANT CHANGE UP (ref 70–99)
HAPTOGLOB SERPL-MCNC: 216 MG/DL — HIGH (ref 34–200)
HCT VFR BLD CALC: 24.1 % — LOW (ref 37–47)
HCT VFR BLD CALC: 24.4 % — LOW (ref 37–47)
HCT VFR BLD CALC: 25 % — LOW (ref 37–47)
HGB BLD-MCNC: 8 G/DL — LOW (ref 12–16)
IMM GRANULOCYTES NFR BLD AUTO: 1 % — HIGH (ref 0.1–0.3)
IMM GRANULOCYTES NFR BLD AUTO: 1.3 % — HIGH (ref 0.1–0.3)
IRON SATN MFR SERPL: 42 % — SIGNIFICANT CHANGE UP (ref 15–50)
IRON SATN MFR SERPL: 55 UG/DL — SIGNIFICANT CHANGE UP (ref 35–150)
LDH SERPL L TO P-CCNC: 164 — SIGNIFICANT CHANGE UP (ref 50–242)
LYMPHOCYTES # BLD AUTO: 1.88 K/UL — SIGNIFICANT CHANGE UP (ref 1.2–3.4)
LYMPHOCYTES # BLD AUTO: 2.07 K/UL — SIGNIFICANT CHANGE UP (ref 1.2–3.4)
LYMPHOCYTES # BLD AUTO: 40.4 % — SIGNIFICANT CHANGE UP (ref 20.5–51.1)
LYMPHOCYTES # BLD AUTO: 40.7 % — SIGNIFICANT CHANGE UP (ref 20.5–51.1)
MAGNESIUM SERPL-MCNC: 1.7 MG/DL — LOW (ref 1.8–2.4)
MCHC RBC-ENTMCNC: 31 PG — SIGNIFICANT CHANGE UP (ref 27–31)
MCHC RBC-ENTMCNC: 31.1 PG — HIGH (ref 27–31)
MCHC RBC-ENTMCNC: 31.7 PG — HIGH (ref 27–31)
MCHC RBC-ENTMCNC: 32 G/DL — SIGNIFICANT CHANGE UP (ref 32–37)
MCHC RBC-ENTMCNC: 32.8 G/DL — SIGNIFICANT CHANGE UP (ref 32–37)
MCHC RBC-ENTMCNC: 33.2 G/DL — SIGNIFICANT CHANGE UP (ref 32–37)
MCV RBC AUTO: 94.9 FL — SIGNIFICANT CHANGE UP (ref 81–99)
MCV RBC AUTO: 95.6 FL — SIGNIFICANT CHANGE UP (ref 81–99)
MCV RBC AUTO: 96.9 FL — SIGNIFICANT CHANGE UP (ref 81–99)
MONOCYTES # BLD AUTO: 0.45 K/UL — SIGNIFICANT CHANGE UP (ref 0.1–0.6)
MONOCYTES # BLD AUTO: 0.49 K/UL — SIGNIFICANT CHANGE UP (ref 0.1–0.6)
MONOCYTES NFR BLD AUTO: 10.6 % — HIGH (ref 1.7–9.3)
MONOCYTES NFR BLD AUTO: 8.8 % — SIGNIFICANT CHANGE UP (ref 1.7–9.3)
NEUTROPHILS # BLD AUTO: 2.1 K/UL — SIGNIFICANT CHANGE UP (ref 1.4–6.5)
NEUTROPHILS # BLD AUTO: 2.44 K/UL — SIGNIFICANT CHANGE UP (ref 1.4–6.5)
NEUTROPHILS NFR BLD AUTO: 45.5 % — SIGNIFICANT CHANGE UP (ref 42.2–75.2)
NEUTROPHILS NFR BLD AUTO: 47.6 % — SIGNIFICANT CHANGE UP (ref 42.2–75.2)
NRBC # BLD: 0 /100 WBCS — SIGNIFICANT CHANGE UP (ref 0–0)
PLATELET # BLD AUTO: 169 K/UL — SIGNIFICANT CHANGE UP (ref 130–400)
PLATELET # BLD AUTO: 177 K/UL — SIGNIFICANT CHANGE UP (ref 130–400)
PLATELET # BLD AUTO: 181 K/UL — SIGNIFICANT CHANGE UP (ref 130–400)
POTASSIUM SERPL-MCNC: 3.7 MMOL/L — SIGNIFICANT CHANGE UP (ref 3.5–5)
POTASSIUM SERPL-MCNC: 3.9 MMOL/L — SIGNIFICANT CHANGE UP (ref 3.5–5)
POTASSIUM SERPL-MCNC: 4.1 MMOL/L — SIGNIFICANT CHANGE UP (ref 3.5–5)
POTASSIUM SERPL-SCNC: 3.7 MMOL/L — SIGNIFICANT CHANGE UP (ref 3.5–5)
POTASSIUM SERPL-SCNC: 3.9 MMOL/L — SIGNIFICANT CHANGE UP (ref 3.5–5)
POTASSIUM SERPL-SCNC: 4.1 MMOL/L — SIGNIFICANT CHANGE UP (ref 3.5–5)
PROCALCITONIN SERPL-MCNC: 0.08 NG/ML — SIGNIFICANT CHANGE UP (ref 0.02–0.1)
PROT SERPL-MCNC: 5.9 G/DL — LOW (ref 6–8)
PROT SERPL-MCNC: 6.1 G/DL — SIGNIFICANT CHANGE UP (ref 6–8)
PROT SERPL-MCNC: 6.1 G/DL — SIGNIFICANT CHANGE UP (ref 6–8)
RBC # BLD: 2.52 M/UL — LOW (ref 4.2–5.4)
RBC # BLD: 2.57 M/UL — LOW (ref 4.2–5.4)
RBC # BLD: 2.58 M/UL — LOW (ref 4.2–5.4)
RBC # FLD: 22.5 % — HIGH (ref 11.5–14.5)
RBC # FLD: 22.7 % — HIGH (ref 11.5–14.5)
RBC # FLD: 23 % — HIGH (ref 11.5–14.5)
SARS-COV-2 RNA SPEC QL NAA+PROBE: SIGNIFICANT CHANGE UP
SODIUM SERPL-SCNC: 137 MMOL/L — SIGNIFICANT CHANGE UP (ref 135–146)
SODIUM SERPL-SCNC: 137 MMOL/L — SIGNIFICANT CHANGE UP (ref 135–146)
SODIUM SERPL-SCNC: 138 MMOL/L — SIGNIFICANT CHANGE UP (ref 135–146)
TIBC SERPL-MCNC: 131 UG/DL — LOW (ref 220–430)
TRANSFERRIN SERPL-MCNC: 101 MG/DL — LOW (ref 200–360)
UIBC SERPL-MCNC: 76 UG/DL — LOW (ref 110–370)
VIT B12 SERPL-MCNC: 1689 PG/ML — HIGH (ref 232–1245)
WBC # BLD: 4.28 K/UL — LOW (ref 4.8–10.8)
WBC # BLD: 4.62 K/UL — LOW (ref 4.8–10.8)
WBC # BLD: 5.12 K/UL — SIGNIFICANT CHANGE UP (ref 4.8–10.8)
WBC # FLD AUTO: 4.28 K/UL — LOW (ref 4.8–10.8)
WBC # FLD AUTO: 4.62 K/UL — LOW (ref 4.8–10.8)
WBC # FLD AUTO: 5.12 K/UL — SIGNIFICANT CHANGE UP (ref 4.8–10.8)

## 2022-09-05 PROCEDURE — 88189 FLOWCYTOMETRY/READ 16 & >: CPT

## 2022-09-05 PROCEDURE — 76700 US EXAM ABDOM COMPLETE: CPT | Mod: 26

## 2022-09-05 PROCEDURE — 99222 1ST HOSP IP/OBS MODERATE 55: CPT

## 2022-09-05 PROCEDURE — 99233 SBSQ HOSP IP/OBS HIGH 50: CPT

## 2022-09-05 RX ORDER — LANOLIN ALCOHOL/MO/W.PET/CERES
5 CREAM (GRAM) TOPICAL AT BEDTIME
Refills: 0 | Status: DISCONTINUED | OUTPATIENT
Start: 2022-09-05 | End: 2022-09-06

## 2022-09-05 RX ORDER — LANOLIN ALCOHOL/MO/W.PET/CERES
1 CREAM (GRAM) TOPICAL
Qty: 0 | Refills: 0 | DISCHARGE
Start: 2022-09-05

## 2022-09-05 RX ORDER — ERYTHROPOIETIN 10000 [IU]/ML
40000 INJECTION, SOLUTION INTRAVENOUS; SUBCUTANEOUS ONCE
Refills: 0 | Status: DISCONTINUED | OUTPATIENT
Start: 2022-09-05 | End: 2022-09-06

## 2022-09-05 RX ADMIN — Medication 5 MILLIGRAM(S): at 21:20

## 2022-09-05 RX ADMIN — POLYETHYLENE GLYCOL 3350 17 GRAM(S): 17 POWDER, FOR SOLUTION ORAL at 12:28

## 2022-09-05 RX ADMIN — LACTULOSE 10 GRAM(S): 10 SOLUTION ORAL at 12:27

## 2022-09-05 RX ADMIN — LOSARTAN POTASSIUM 50 MILLIGRAM(S): 100 TABLET, FILM COATED ORAL at 05:21

## 2022-09-05 RX ADMIN — Medication 1 MILLIGRAM(S): at 12:28

## 2022-09-05 RX ADMIN — GABAPENTIN 100 MILLIGRAM(S): 400 CAPSULE ORAL at 21:19

## 2022-09-05 RX ADMIN — DORZOLAMIDE HYDROCHLORIDE 2 DROP(S): 20 SOLUTION/ DROPS OPHTHALMIC at 05:34

## 2022-09-05 RX ADMIN — Medication 500 MILLIGRAM(S): at 12:25

## 2022-09-05 RX ADMIN — DORZOLAMIDE HYDROCHLORIDE 2 DROP(S): 20 SOLUTION/ DROPS OPHTHALMIC at 21:19

## 2022-09-05 RX ADMIN — GABAPENTIN 100 MILLIGRAM(S): 400 CAPSULE ORAL at 12:26

## 2022-09-05 RX ADMIN — ATORVASTATIN CALCIUM 80 MILLIGRAM(S): 80 TABLET, FILM COATED ORAL at 21:19

## 2022-09-05 RX ADMIN — PANTOPRAZOLE SODIUM 40 MILLIGRAM(S): 20 TABLET, DELAYED RELEASE ORAL at 17:36

## 2022-09-05 RX ADMIN — PANTOPRAZOLE SODIUM 40 MILLIGRAM(S): 20 TABLET, DELAYED RELEASE ORAL at 05:21

## 2022-09-05 RX ADMIN — GABAPENTIN 100 MILLIGRAM(S): 400 CAPSULE ORAL at 05:22

## 2022-09-05 RX ADMIN — DORZOLAMIDE HYDROCHLORIDE 2 DROP(S): 20 SOLUTION/ DROPS OPHTHALMIC at 12:26

## 2022-09-05 RX ADMIN — SENNA PLUS 1 TABLET(S): 8.6 TABLET ORAL at 21:20

## 2022-09-05 RX ADMIN — Medication 1 TABLET(S): at 12:25

## 2022-09-05 NOTE — PROGRESS NOTE ADULT - ASSESSMENT
89-year-old female with history of chronic anemia, HTN, HLD, GERD previous blood transfusions in the past presents for hemoglobin of 6.8 found on outpatient lab work from nursing home. Patient recently came to the ER was given a transfusion and sent home.  Patient does not have any complaints.  Denies any bleeding hematuria blood in stool abdominal pain nausea vomiting black or tarry stool dizziness lightheadedness syncope or shortness of breath.    A/P   # Chronic anemia / Suspected MDS  - consulted by hematology, recommendations noted, flow cytometry ordered for 4 PM , r/o MDS   - H/H: on admission 7.2   - usually pt gets Procrit 40 000 units on Monday and Thursday   - no active bleeding noted   - monitor H/H, keep Hb above 7.5   - chest stool for occult blood     #HLD  - lipitor 80 mg PO QD    # HTN  - DASH diet   on Losartan     # Constipation   - high fiber diet   - on Laxatives     # GI/DVT prophylaxis     # Dispo: anticipate discharge in 24 hours

## 2022-09-05 NOTE — CONSULT NOTE ADULT - SUBJECTIVE AND OBJECTIVE BOX
Patient is a 89y old  Female who presents with a chief complaint of Anemia (05 Sep 2022 08:58)      HPI:  89-year-old female with history of chronic anemia on procrit, HTN, HLD, GERD previous blood transfusions in the past presents for hemoglobin of 6.8 found on outpatient lab work from nursing home. Patient recently came to the ER was given a transfusion and sent home.  Patient does not have any complaints.  Denies any bleeding hematuria blood in stool abdominal pain nausea vomiting black or tarry stool dizziness lightheadedness syncope or shortness of breath.    In the ED patient was found to have a Hgb of 7.2.  (04 Sep 2022 22:41)       ROS:  Negative except for:    PAST MEDICAL & SURGICAL HISTORY:  No pertinent past medical history          SOCIAL HISTORY: Denies smoking, drinking alcohol.    FAMILY HISTORY: unknown      MEDICATIONS  (STANDING):  ascorbic acid 500 milliGRAM(s) Oral daily  atorvastatin 80 milliGRAM(s) Oral at bedtime  calcium carbonate    500 mG (Tums) Chewable 1 Tablet(s) Chew daily  dorzolamide 2% Ophthalmic Solution 2 Drop(s) Both EYES three times a day  folic acid 1 milliGRAM(s) Oral daily  gabapentin 100 milliGRAM(s) Oral three times a day  lactulose Syrup 10 Gram(s) Oral daily  losartan 50 milliGRAM(s) Oral daily  melatonin 5 milliGRAM(s) Oral at bedtime  pantoprazole  Injectable 40 milliGRAM(s) IV Push two times a day  polyethylene glycol 3350 17 Gram(s) Oral daily  senna 8.6 milliGRAM(s) Oral Tablet - Peds 1 Tablet(s) Oral at bedtime    MEDICATIONS  (PRN):  acetaminophen   Oral Tab/Cap - Peds. 650 milliGRAM(s) Oral every 6 hours PRN Temp greater or equal to 38 C (100.4 F), Mild Pain (1 - 3)    Height (cm): 154.9 (09-04-22 @ 16:47)  Weight (kg): 52.2 (09-04-22 @ 16:47)  BMI (kg/m2): 21.8 (09-04-22 @ 16:47)  BSA (m2): 1.49 (09-04-22 @ 16:47)  Allergies    No Known Allergies    Intolerances        Vital Signs Last 24 Hrs  T(C): 36.1 (05 Sep 2022 06:03), Max: 36.7 (04 Sep 2022 16:47)  T(F): 96.9 (05 Sep 2022 06:03), Max: 98.1 (04 Sep 2022 16:47)  HR: 59 (05 Sep 2022 06:03) (59 - 96)  BP: 157/71 (05 Sep 2022 06:03) (134/58 - 157/71)  BP(mean): --  RR: 18 (05 Sep 2022 06:03) (18 - 18)  SpO2: 96% (05 Sep 2022 05:47) (96% - 97%)    Parameters below as of 05 Sep 2022 05:47  Patient On (Oxygen Delivery Method): room air        PHYSICAL EXAM  General: adult in NAD  HEENT: clear oropharynx, anicteric sclera, pink conjunctiva  Neck: supple  CV: normal S1/S2 with no murmur rubs or gallops  Lungs: positive air movement b/l ant lungs,clear to auscultation, no wheezes, no rales  Abdomen: soft non-tender non-distended, no hepatosplenomegaly  Ext: no clubbing cyanosis or edema  Skin: no rashes and no petechiae  Neuro: alert and oriented X 4, no focal deficits      LABS:                          8.0    5.12  )-----------( 177      ( 05 Sep 2022 07:13 )             24.1         Mean Cell Volume : 95.6 fL  Mean Cell Hemoglobin : 31.7 pg  Mean Cell Hemoglobin Concentration : 33.2 g/dL  Auto Neutrophil # : 2.44 K/uL  Auto Lymphocyte # : 2.07 K/uL  Auto Monocyte # : 0.45 K/uL  Auto Eosinophil # : 0.10 K/uL  Auto Basophil # : 0.01 K/uL  Auto Neutrophil % : 47.6 %  Auto Lymphocyte % : 40.4 %  Auto Monocyte % : 8.8 %  Auto Eosinophil % : 2.0 %  Auto Basophil % : 0.2 %      Serial CBC's  09-05 @ 07:13  Hct-24.1 / Hgb-8.0 / Plat-177 / RBC-2.52 / WBC-5.12  Serial CBC's  09-04 @ 17:40  Hct-22.1 / Hgb-7.2 / Plat-209 / RBC-2.15 / WBC-5.53      09-05    138  |  108  |  22<H>  ----------------------------<  87  3.9   |  22  |  0.5<L>    Ca    7.7<L>      05 Sep 2022 07:13  Mg     1.7     09-05    TPro  6.1  /  Alb  2.6<L>  /  TBili  0.7  /  DBili  0.3  /  AST  17  /  ALT  15  /  AlkPhos  75  09-05      PT/INR - ( 04 Sep 2022 21:00 )   PT: 15.20 sec;   INR: 1.33 ratio         PTT - ( 04 Sep 2022 21:00 )  PTT:25.0 sec    Iron - Total Binding Capacity.: 131 ug/dL (09-05 @ 07:13)                    RADIOLOGY & ADDITIONAL STUDIES: < from: US Abdomen Complete (US Abdomen Complete .) (09.05.22 @ 10:08) >  IMPRESSION:    Nonobstructing left lower pole 0.3 cm renal calculus.    Otherwise, unremarkable abdominal sonogram.    --- End of Report ---    < end of copied text >

## 2022-09-05 NOTE — DISCHARGE NOTE PROVIDER - NSDCCAREPROVSEEN_GEN_ALL_CORE_FT
[facility] Internal Medicine [facility] Internal Medicine team  [facility] Heme/Onc team

## 2022-09-05 NOTE — DISCHARGE NOTE PROVIDER - CARE PROVIDERS DIRECT ADDRESSES
,DirectAddress_Unknown,[email or web address] ,DirectAddress_Unknown,[email or web address]

## 2022-09-05 NOTE — DISCHARGE NOTE PROVIDER - NSDCCPCAREPLAN_GEN_ALL_CORE_FT
PRINCIPAL DISCHARGE DIAGNOSIS  Diagnosis: Anemia, unspecified  Assessment and Plan of Treatment: You were found to have anemia on outpatient work up and came to ED for further management and workup of anemia. You were asymptomatic and had no signs of bleding. Vitals were stable. Your Hb was 7.2 on admission. You recieved 1 unit of blood transfusion. Heme/oncologist was consulted for some abnormal findngs on workup. But you were requesting to go home. You were medically stable to be discharged. You can follow up outpatient Heme/oncologist and PCP in 1-2 weeks for further management of Anemia. Continue with home meds.       PRINCIPAL DISCHARGE DIAGNOSIS  Diagnosis: Anemia, unspecified  Assessment and Plan of Treatment: You were found to have anemia on outpatient work up and came to ED for further management and workup of anemia. You were asymptomatic and had no signs of bleding. Vitals were stable. Your Hb was 7.2 on admission. You recieved 1 unit of blood transfusion. Heme/oncologist was consulted for some abnormal findngs on workup. But you were requesting to go home. You were medically stable to be discharged. You recieved your home dose Retacrit before discharge. You can follow up outpatient Heme/oncologist and PCP in 1-2 weeks to review labs and for further management of Anemia. Continue with home meds.       PRINCIPAL DISCHARGE DIAGNOSIS  Diagnosis: Anemia, unspecified  Assessment and Plan of Treatment: You were found to have anemia on outpatient work up and came to ED for further management and workup of anemia. You were asymptomatic and had no signs of bleding. Vitals were stable. Your Hb was 7.2 on admission. You recieved 1 unit of blood transfusion. Heme/oncologist was consulted for some abnormal findngs on workup. But you were requesting to go home. You were medically stable to be discharged. You recieved your home dose Retacrit before discharge. You can follow up outpatient Heme/oncologist and PCP in 1-2 weeks to review labs and for further management of Anemia. Continue with home meds and Procrit on Tuesday and Friday.

## 2022-09-05 NOTE — DISCHARGE NOTE PROVIDER - PROVIDER TOKENS
PROVIDER:[TOKEN:[[id number]]],PROVIDER:[TOKEN:[[id number]],FOLLOWUP:[1 week]] PROVIDER:[TOKEN:[[id number]],FOLLOWUP:[2 weeks]],PROVIDER:[TOKEN:[[id number]],FOLLOWUP:[2 weeks]] PROVIDER:[TOKEN:[[id number]],FOLLOWUP:[2 weeks]],PROVIDER:[TOKEN:[[id number]]]

## 2022-09-05 NOTE — DISCHARGE NOTE PROVIDER - NSDCMRMEDTOKEN_GEN_ALL_CORE_FT
acetaminophen 325 mg oral tablet: 2 tab(s) orally every 4 hours, As Needed  aspirin 81 mg oral tablet, chewable: 1 tab(s) orally once a day  brinzolamide 1% ophthalmic suspension: 1 drop(s) to each affected eye 3 times a day  calcium carbonate 500 mg (200 mg elemental calcium) oral tablet, chewable: 1 tab(s) orally once a day  ferrous sulfate 220 mg/5 mL (44 mg/5 mL elemental iron) oral elixir: 5 milliliter(s) orally once a day  folic acid 1 mg oral tablet: 1 tab(s) orally once a day  gabapentin 100 mg oral capsule: 1 cap(s) orally 3 times a day  glucosamine hydrochloride 1500 mg oral tablet: 1 tab(s) orally once a day  lactulose 10 g/15 mL oral syrup: 15 milliliter(s) orally once a day  Lipitor 80 mg oral tablet: 1 tab(s) orally once a day  losartan 50 mg oral tablet: 1 tab(s) orally once a day  MiraLax oral powder for reconstitution: 17 gram(s) orally once a day  oxybutynin 5 mg/24 hours oral tablet, extended release: 1 tab(s) orally once a day  Procrit 40,000 units/mL preservative-free injectable solution: 1 milliliter(s) injectable 2 times a week  Refresh ophthalmic solution: 1 drop(s) to each affected eye 4 times a day  Senna 8.6 mg oral tablet: 1 tab(s) orally once a day (at bedtime)  Vitamin C 100 mg oral tablet: 1 tab(s) orally once a day   acetaminophen 325 mg oral tablet: 2 tab(s) orally every 4 hours, As Needed  aspirin 81 mg oral tablet, chewable: 1 tab(s) orally once a day  brinzolamide 1% ophthalmic suspension: 1 drop(s) to each affected eye 3 times a day  calcium carbonate 500 mg (200 mg elemental calcium) oral tablet, chewable: 1 tab(s) orally once a day  ferrous sulfate 220 mg/5 mL (44 mg/5 mL elemental iron) oral elixir: 5 milliliter(s) orally once a day  folic acid 1 mg oral tablet: 1 tab(s) orally once a day  gabapentin 100 mg oral capsule: 1 cap(s) orally 3 times a day  glucosamine hydrochloride 1500 mg oral tablet: 1 tab(s) orally once a day  lactulose 10 g/15 mL oral syrup: 15 milliliter(s) orally once a day  Lipitor 80 mg oral tablet: 1 tab(s) orally once a day  losartan 50 mg oral tablet: 1 tab(s) orally once a day  melatonin 5 mg oral tablet: 1 tab(s) orally once a day (at bedtime)  MiraLax oral powder for reconstitution: 17 gram(s) orally once a day  oxybutynin 5 mg/24 hours oral tablet, extended release: 1 tab(s) orally once a day  Procrit 40,000 units/mL preservative-free injectable solution: 1 milliliter(s) injectable 2 times a week  Refresh ophthalmic solution: 1 drop(s) to each affected eye 4 times a day  Senna 8.6 mg oral tablet: 1 tab(s) orally once a day (at bedtime)  Vitamin C 100 mg oral tablet: 1 tab(s) orally once a day   acetaminophen 325 mg oral tablet: 2 tab(s) orally every 4 hours, As Needed  aspirin 81 mg oral tablet, chewable: 1 tab(s) orally once a day  brinzolamide 1% ophthalmic suspension: 1 drop(s) to each affected eye 3 times a day  calcium carbonate 500 mg (200 mg elemental calcium) oral tablet, chewable: 1 tab(s) orally once a day  ferrous sulfate 220 mg/5 mL (44 mg/5 mL elemental iron) oral elixir: 5 milliliter(s) orally once a day  folic acid 1 mg oral tablet: 1 tab(s) orally once a day  gabapentin 100 mg oral capsule: 1 cap(s) orally 3 times a day  glucosamine hydrochloride 1500 mg oral tablet: 1 tab(s) orally once a day  lactulose 10 g/15 mL oral syrup: 15 milliliter(s) orally once a day  Lipitor 80 mg oral tablet: 1 tab(s) orally once a day  losartan 50 mg oral tablet: 1 tab(s) orally once a day  melatonin 5 mg oral tablet: 1 tab(s) orally once a day (at bedtime)  MiraLax oral powder for reconstitution: 17 gram(s) orally once a day  oxybutynin 5 mg/24 hours oral tablet, extended release: 1 tab(s) orally once a day  Procrit 40,000 units/mL preservative-free injectable solution: 1 milliliter(s) injectable 2 times a week  Tuesday and Friday.  Refresh ophthalmic solution: 1 drop(s) to each affected eye 4 times a day  Senna 8.6 mg oral tablet: 1 tab(s) orally once a day (at bedtime)  Vitamin C 100 mg oral tablet: 1 tab(s) orally once a day

## 2022-09-05 NOTE — DISCHARGE NOTE PROVIDER - HOSPITAL COURSE
89 YO F with a PMH of chronic anemia on procrit s/p multiple transfusions, HTN, HLD, and GERD who was sent in from her NH ([place]) for eval of low Hgb on out-pt blood work. The pt denied any symptoms. No black/bloody stools, no hematuria, no excessive bruising, and no hematemesis. ROS is negative except as above.     # Chronic Anemina s/p mutiple transfusions  # Macrocytic anemia and Bandemia ? ? Myelodysplastic   - On Procrit, ferrous sulphate, folic acid and ascorbic acid at home  - Asymptomatic, VSS  - Hb 7.2 on admission  - s/p 1 unit pRBCs  - TSH, B12 and Folate levels  - Anemia work-up- ++Metamyelocytes and Band neutrophils  - Peripheral smear- +smudge cells, +giant platelets  - FOBT  - Heme/Onc consult  - Follow OP  - PT    #Hypoalbuminemia  - poor oral intake.   - Nutrition eval.     #HTN, HLD, and GERD  - home meds    Patient wanted to go home. Pt. medically stable to be discharged today. F/u outpatient Heme/Onc for further management of on anemia. 89 YO F with a PMH of chronic anemia on procrit s/p multiple transfusions, HTN, HLD, and GERD who was sent in from her NH ([place]) for eval of low Hgb on out-pt blood work. The pt denied any symptoms. No black/bloody stools, no hematuria, no excessive bruising, and no hematemesis. ROS is negative except as above.     # Chronic Anemina s/p mutiple transfusions  # Macrocytic anemia and Bandemia ? ? Myelodysplastic   - On Procrit, ferrous sulphate, folic acid and ascorbic acid at home  - Asymptomatic, VSS  - Hb 7.2 on admission  - s/p 1 unit pRBCs  - TSH, B12 and Folate levels  - Anemia work-up- ++Metamyelocytes and Band neutrophils  - Peripheral smear- +smudge cells, +giant platelets  - FOBT  - Heme/Onc consult  - Follow OP  - PT    #Hypoalbuminemia  - poor oral intake.   - Nutrition eval.     #HTN, HLD, and GERD  - home meds    Patient's Hb stable, asymptomatic., VVS and wanted to go home. Pt. medically stable to be discharged today. F/u outpatient Heme/Onc within 1-2 weeks for further management of on anemia. 89 YO F with a PMH of chronic anemia on procrit s/p multiple transfusions, HTN, HLD, and GERD who was sent in from her NH ([place]) for eval of low Hgb on out-pt blood work. The pt denied any symptoms. No black/bloody stools, no hematuria, no excessive bruising, and no hematemesis. ROS is negative except as above.     # Chronic Anemina s/p mutiple transfusions  # Macrocytic anemia and Bandemia ? ? Myelodysplastic   - On Procrit, ferrous sulphate, folic acid and ascorbic acid at home  - Asymptomatic, VSS  - Hb 7.2 on admission  - s/p 1 unit pRBCs  - TSH, B12 and Folate levels  - Anemia work-up- ++Metamyelocytes and Band neutrophils  - Peripheral smear- +smudge cells, +giant platelets  - FOBT  - Heme/Onc consulted  - Flow cytometry ordered to rule out MDS  - Follow OP   - PT  - Received her home Retacrit dose before discharge on 9/6    #Hypoalbuminemia  - poor oral intake.   - Nutrition eval.     #HTN, HLD, and GERD  - home meds    Patient's Hb stable, asymptomatic., VVS and wanted to go home. Pt. medically stable to be discharged today. F/u outpatient Heme/Onc within 1 weeks to review labs and for further management of  anemia. 89 YO F with a PMH of chronic anemia on procrit s/p multiple transfusions, HTN, HLD, and GERD who was sent in from her NH ([place]) for eval of low Hgb on out-pt blood work. The pt denied any symptoms. No black/bloody stools, no hematuria, no excessive bruising, and no hematemesis. ROS is negative except as above.     # Chronic Anemina s/p mutiple transfusions  # Macrocytic anemia and Bandemia ? ? Myelodysplastic   - On Procrit, ferrous sulphate, folic acid and ascorbic acid at home  - Asymptomatic, VSS  - Hb 7.2 on admission  - s/p 1 unit pRBCs  - TSH, B12 and Folate levels  - Anemia work-up- ++Metamyelocytes and Band neutrophils  - Peripheral smear- +smudge cells, +giant platelets  - FOBT  - Heme/Onc consulted  - Flow cytometry ordered to rule out MDS  - Follow OP   - PT  - Received her home Retacrit dose before discharge on 9/6  - Give Retacrit on Tuesday and Friday    #Hypoalbuminemia  - poor oral intake.   - Nutrition eval.     #HTN, HLD, and GERD  - home meds    Patient's Hb stable, asymptomatic., VVS and wanted to go home. Pt. medically stable to be discharged today. F/u outpatient Heme/Onc within 1 weeks to review labs and for further management of  anemia. 89 YO F with a PMH of chronic anemia on procrit s/p multiple transfusions, HTN, HLD, and GERD who was sent in from her NH ([place]) for eval of low Hgb on out-pt blood work. The pt denied any symptoms. No black/bloody stools, no hematuria, no excessive bruising, and no hematemesis. ROS is negative except as above.     # Chronic Anemina s/p mutiple transfusions  # Macrocytic anemia and Bandemia ? ? Myelodysplastic   - On Procrit, ferrous sulphate, folic acid and ascorbic acid at home  - Asymptomatic, VSS  - Hb 7.2 on admission  - s/p 1 unit pRBCs  - TSH, B12 and Folate levels  - Anemia work-up- ++Metamyelocytes and Band neutrophils  - Peripheral smear- +smudge cells, +giant platelets  - Heme/Onc consulted  - Flow cytometry sent on 9/5 to rule out MDS  - Follow OP   - PT  - Received her home Retacrit dose before discharge on 9/6  - Give Retacrit on Tuesday and Friday    #Hypoalbuminemia  - poor oral intake.   - Nutrition eval.     #HTN, HLD, and GERD  - home meds    Patient's Hb stable, asymptomatic., VVS and wanted to go home. Pt. medically stable to be discharged today. F/u outpatient Heme/Onc within 1 weeks to review labs and for further management of  anemia.

## 2022-09-05 NOTE — CONSULT NOTE ADULT - ASSESSMENT
89-year-old female with history of chronic anemia on procrit , HTN, HLD, GERD previous blood transfusions in the past presents for hemoglobin of 6.8 found on outpatient lab work from nursing home.       Hematology and Oncology was consulted for Macrocytic anemia  #Macrocytic anemia r/o nutritional vs hemolysis vs MDS.    -Hb on admission was 7.8.  -Baseline hb 7 to 8.(HIE labs).  -vitamin b12 and folate , LDH, reticlulocyte count sent. 89-year-old female with history of chronic anemia on procrit , HTN, HLD, GERD previous blood transfusions in the past presents for hemoglobin of 6.8 found on outpatient lab work from nursing home.       Hematology and Oncology was consulted for Chronic  anemia    #Macrocytic anemia r/o nutritional vs hemolysis vs MDS.  #Chronic anemia requiring transfusions.    -Hb on admission was 7.8  -Baseline hb 7 to 8.(HIE labs).  -vitamin b12 and folate , LDH, reticulocyte count sent, DCT sent.  Her reccent transfusion was in 8/22 in ER for anemia.    Plan  -Please confirm the dose she is getting at nursing home and increase the dose.  -please send Peripheral flow cytometry to r/o Myelodysplastic syndrome. will place the flow cytometry form in the chart. (Blood needs to be drawn in two green and two lavender tubes)  -transfuse for Hb < 7.  -out patient follow up with hematology.     89-year-old female with history of chronic anemia on procrit , HTN, HLD, GERD previous blood transfusions in the past presents for hemoglobin of 6.8 found on outpatient lab work from nursing home.       Hematology and Oncology was consulted for Chronic  anemia    #Macrocytic anemia r/o nutritional vs hemolysis vs MDS.  #Chronic anemia requiring transfusions.    -Hb on admission was 7.8  -Baseline hb 7 to 8.(HIE labs).  -vitamin b12 and folate , LDH, reticulocyte count sent, DCT sent.  Her reccent transfusion was in 8/22 in ER for anemia.    Plan  -Please confirm the procrit dose she is getting at nursing home and increase the dose.  -please send Peripheral flow cytometry to r/o Myelodysplastic syndrome. will place the flow cytometry form in the chart. (Blood needs to be drawn in two green and two lavender tubes)  -transfuse for Hb < 7.  -out patient follow up with hematology.     89-year-old female with history of chronic anemia on procrit , HTN, HLD, GERD previous blood transfusions in the past presents for hemoglobin of 6.8 found on outpatient lab work from nursing home.       Hematology and Oncology was consulted for Chronic  anemia    #Macrocytic anemia r/o nutritional vs hemolysis vs MDS. ( pt states she might have had a bone marrow biopsy in the past, check with NH records)  #Chronic anemia requiring transfusions.    -Hb on admission was 7.8  -Baseline hb 7 to 8.(HIE labs).  -vitamin b12 and folate , LDH, reticulocyte count sent, DCT sent.  Her reccent transfusion was in 8/22 in ER for anemia.    Plan  -Please confirm the procrit dose she is getting at nursing home and increase the dose.  -please send Peripheral flow cytometry to r/o Myelodysplastic syndrome. will place the flow cytometry form in the chart. (Blood needs to be drawn in two green and two lavender tubes)  -transfuse for Hb < 7.  -out patient follow up with hematology.

## 2022-09-05 NOTE — DISCHARGE NOTE PROVIDER - CARE PROVIDER_API CALL
Tang Vargas (DO)  Internal Medicine  [address] [place] [address]  Phone: [phone]  Fax: [phone]  Follow Up Time:     Wilfredo Borrero (DO)  Hematology; Internal Medicine; Medical Oncology  [address] [place] [address]  Phone: [phone]  Fax: [phone]  Follow Up Time: 1 week   Tang Vargas (DO)  Internal Medicine  [address] [place] [address]  Phone: [phone]  Fax: [phone]  Follow Up Time: 2 weeks    Wilfredo Borrero (DO)  Hematology; Internal Medicine; Medical Oncology  [address] [place] [address]  Phone: [phone]  Fax: [phone]  Follow Up Time: 2 weeks   Tang Vargas (DO)  Internal Medicine  [address] [place] [address]  Phone: [phone]  Fax: [phone]  Follow Up Time: 2 weeks    Shawnee Bhakta)  Hematology; Internal Medicine; Medical Oncology  [address] [place] [id number]  Phone: [phone]  Fax: [phone]  Follow Up Time:

## 2022-09-06 ENCOUNTER — TRANSCRIPTION ENCOUNTER (OUTPATIENT)
Age: 87
End: 2022-09-06

## 2022-09-06 VITALS
RESPIRATION RATE: 18 BRPM | DIASTOLIC BLOOD PRESSURE: 77 MMHG | TEMPERATURE: 97 F | SYSTOLIC BLOOD PRESSURE: 142 MMHG | HEART RATE: 66 BPM

## 2022-09-06 LAB
ALBUMIN SERPL ELPH-MCNC: 2.7 G/DL — LOW (ref 3.5–5.2)
ALP SERPL-CCNC: 71 U/L — SIGNIFICANT CHANGE UP (ref 30–115)
ALT FLD-CCNC: 16 U/L — SIGNIFICANT CHANGE UP (ref 0–41)
ANION GAP SERPL CALC-SCNC: 9 MMOL/L — SIGNIFICANT CHANGE UP (ref 7–14)
AST SERPL-CCNC: 19 U/L — SIGNIFICANT CHANGE UP (ref 0–41)
BILIRUB SERPL-MCNC: 0.8 MG/DL — SIGNIFICANT CHANGE UP (ref 0.2–1.2)
BUN SERPL-MCNC: 21 MG/DL — HIGH (ref 10–20)
CALCIUM SERPL-MCNC: 7.7 MG/DL — LOW (ref 8.5–10.1)
CHLORIDE SERPL-SCNC: 108 MMOL/L — SIGNIFICANT CHANGE UP (ref 98–110)
CO2 SERPL-SCNC: 21 MMOL/L — SIGNIFICANT CHANGE UP (ref 17–32)
CREAT SERPL-MCNC: <0.5 MG/DL — LOW (ref 0.7–1.5)
CULTURE RESULTS: SIGNIFICANT CHANGE UP
EGFR: 89 ML/MIN/1.73M2 — SIGNIFICANT CHANGE UP
GLUCOSE SERPL-MCNC: 92 MG/DL — SIGNIFICANT CHANGE UP (ref 70–99)
HCT VFR BLD CALC: 25.7 % — LOW (ref 37–47)
HGB BLD-MCNC: 8.2 G/DL — LOW (ref 12–16)
MAGNESIUM SERPL-MCNC: 1.7 MG/DL — LOW (ref 1.8–2.4)
MCHC RBC-ENTMCNC: 30.7 PG — SIGNIFICANT CHANGE UP (ref 27–31)
MCHC RBC-ENTMCNC: 31.9 G/DL — LOW (ref 32–37)
MCV RBC AUTO: 96.3 FL — SIGNIFICANT CHANGE UP (ref 81–99)
NRBC # BLD: 0 /100 WBCS — SIGNIFICANT CHANGE UP (ref 0–0)
PLATELET # BLD AUTO: 182 K/UL — SIGNIFICANT CHANGE UP (ref 130–400)
POTASSIUM SERPL-MCNC: 3.9 MMOL/L — SIGNIFICANT CHANGE UP (ref 3.5–5)
POTASSIUM SERPL-SCNC: 3.9 MMOL/L — SIGNIFICANT CHANGE UP (ref 3.5–5)
PROT SERPL-MCNC: 6.3 G/DL — SIGNIFICANT CHANGE UP (ref 6–8)
RBC # BLD: 2.67 M/UL — LOW (ref 4.2–5.4)
RBC # FLD: 22.5 % — HIGH (ref 11.5–14.5)
SODIUM SERPL-SCNC: 138 MMOL/L — SIGNIFICANT CHANGE UP (ref 135–146)
SPECIMEN SOURCE: SIGNIFICANT CHANGE UP
TM INTERPRETATION: SIGNIFICANT CHANGE UP
WBC # BLD: 5.54 K/UL — SIGNIFICANT CHANGE UP (ref 4.8–10.8)
WBC # FLD AUTO: 5.54 K/UL — SIGNIFICANT CHANGE UP (ref 4.8–10.8)

## 2022-09-06 PROCEDURE — 99232 SBSQ HOSP IP/OBS MODERATE 35: CPT

## 2022-09-06 RX ORDER — ERYTHROPOIETIN 10000 [IU]/ML
1 INJECTION, SOLUTION INTRAVENOUS; SUBCUTANEOUS
Qty: 0 | Refills: 0 | DISCHARGE

## 2022-09-06 RX ORDER — MAGNESIUM SULFATE 500 MG/ML
2 VIAL (ML) INJECTION ONCE
Refills: 0 | Status: DISCONTINUED | OUTPATIENT
Start: 2022-09-06 | End: 2022-09-06

## 2022-09-06 RX ORDER — ERYTHROPOIETIN 10000 [IU]/ML
40000 INJECTION, SOLUTION INTRAVENOUS; SUBCUTANEOUS ONCE
Refills: 0 | Status: COMPLETED | OUTPATIENT
Start: 2022-09-06 | End: 2022-09-06

## 2022-09-06 RX ORDER — MAGNESIUM SULFATE 500 MG/ML
2 VIAL (ML) INJECTION ONCE
Refills: 0 | Status: COMPLETED | OUTPATIENT
Start: 2022-09-06 | End: 2022-09-06

## 2022-09-06 RX ADMIN — ERYTHROPOIETIN 40000 UNIT(S): 10000 INJECTION, SOLUTION INTRAVENOUS; SUBCUTANEOUS at 10:11

## 2022-09-06 RX ADMIN — LOSARTAN POTASSIUM 50 MILLIGRAM(S): 100 TABLET, FILM COATED ORAL at 05:38

## 2022-09-06 RX ADMIN — GABAPENTIN 100 MILLIGRAM(S): 400 CAPSULE ORAL at 05:38

## 2022-09-06 RX ADMIN — POLYETHYLENE GLYCOL 3350 17 GRAM(S): 17 POWDER, FOR SOLUTION ORAL at 12:27

## 2022-09-06 RX ADMIN — LACTULOSE 10 GRAM(S): 10 SOLUTION ORAL at 12:27

## 2022-09-06 RX ADMIN — Medication 1 MILLIGRAM(S): at 12:26

## 2022-09-06 RX ADMIN — DORZOLAMIDE HYDROCHLORIDE 2 DROP(S): 20 SOLUTION/ DROPS OPHTHALMIC at 12:26

## 2022-09-06 RX ADMIN — Medication 25 GRAM(S): at 10:11

## 2022-09-06 RX ADMIN — DORZOLAMIDE HYDROCHLORIDE 2 DROP(S): 20 SOLUTION/ DROPS OPHTHALMIC at 05:38

## 2022-09-06 RX ADMIN — Medication 500 MILLIGRAM(S): at 12:27

## 2022-09-06 RX ADMIN — GABAPENTIN 100 MILLIGRAM(S): 400 CAPSULE ORAL at 12:26

## 2022-09-06 RX ADMIN — Medication 1 TABLET(S): at 12:27

## 2022-09-06 NOTE — PROGRESS NOTE ADULT - SUBJECTIVE AND OBJECTIVE BOX
Patient is a 89y old  Female who presents with anemia, received one unit of PRBC on this admission, she was consulted by hematology, they wanted to check flow cytometry, will give Procrit today and plan discharge in 24 hours.   Today pt is comfortable, denies any complaints asking about discharge.        Vital Signs Last 24 Hrs  T(C): 35.7 (05 Sep 2022 13:57), Max: 36.7 (04 Sep 2022 16:47)  T(F): 96.3 (05 Sep 2022 13:57), Max: 98.1 (04 Sep 2022 16:47)  HR: 58 (05 Sep 2022 13:57) (58 - 96)  BP: 146/72 (05 Sep 2022 13:57) (134/58 - 157/71)  BP(mean): --  RR: 18 (05 Sep 2022 13:57) (18 - 18)  SpO2: 96% (05 Sep 2022 05:47) (96% - 97%)    Parameters below as of 05 Sep 2022 05:47  Patient On (Oxygen Delivery Method): room air      PHYSICAL EXAM:  GENERAL: NAD, elderly female with temporal muscle waisting   HEAD:  Atraumatic, Normocephalic  NECK: Supple, No JVD, Normal thyroid  NERVOUS SYSTEM:  Alert & Oriented X3, Good concentration; no focal neuro deficit   CHEST/LUNG: Clear to percussion bilaterally  HEART: Regular rate and rhythm; No murmurs, rubs, or gallops  ABDOMEN: Soft, Nontender, Nondistended; Bowel sounds present  EXTREMITIES:  2+ Peripheral Pulses, No clubbing, cyanosis, or edema  LYMPH: No lymphadenopathy noted  SKIN: No rashes or lesions      LABS:                        8.0    4.28  )-----------( 169      ( 05 Sep 2022 12:15 )             24.4     09-05    137  |  108  |  20  ----------------------------<  106<H>  3.7   |  23  |  0.5<L>    Ca    7.6<L>      05 Sep 2022 12:15  Mg     1.7     09-05    TPro  5.9<L>  /  Alb  2.5<L>  /  TBili  0.8  /  DBili  x   /  AST  16  /  ALT  15  /  AlkPhos  68  09-05    PT/INR - ( 04 Sep 2022 21:00 )   PT: 15.20 sec;   INR: 1.33 ratio         PTT - ( 04 Sep 2022 21:00 )  PTT:25.0 sec  Urinalysis Basic - ( 04 Sep 2022 22:14 )    Color: Yellow / Appearance: Clear / S.020 / pH: x  Gluc: x / Ketone: Negative  / Bili: Negative / Urobili: 3 mg/dL   Blood: x / Protein: Trace / Nitrite: Negative   Leuk Esterase: Negative / RBC: x / WBC x   Sq Epi: x / Non Sq Epi: x / Bacteria: x    RADIOLOGY & ADDITIONAL TESTS:  < from: US Abdomen Complete (US Abdomen Complete .) (22 @ 10:08) >  IMPRESSION:    Nonobstructing left lower pole 0.3 cm renal calculus.    Otherwise, unremarkable abdominal sonogram.    < end of copied text >    MEDICATIONS  (STANDING):  ascorbic acid 500 milliGRAM(s) Oral daily  atorvastatin 80 milliGRAM(s) Oral at bedtime  calcium carbonate    500 mG (Tums) Chewable 1 Tablet(s) Chew daily  dorzolamide 2% Ophthalmic Solution 2 Drop(s) Both EYES three times a day  epoetin brendan-epbx (RETACRIT) Injectable 31386 Unit(s) SubCutaneous once  folic acid 1 milliGRAM(s) Oral daily  gabapentin 100 milliGRAM(s) Oral three times a day  lactulose Syrup 10 Gram(s) Oral daily  losartan 50 milliGRAM(s) Oral daily  melatonin 5 milliGRAM(s) Oral at bedtime  pantoprazole  Injectable 40 milliGRAM(s) IV Push two times a day  polyethylene glycol 3350 17 Gram(s) Oral daily  senna 8.6 milliGRAM(s) Oral Tablet - Peds 1 Tablet(s) Oral at bedtime    MEDICATIONS  (PRN):  acetaminophen   Oral Tab/Cap - Peds. 650 milliGRAM(s) Oral every 6 hours PRN Temp greater or equal to 38 C (100.4 F), Mild Pain (1 - 3)      
Patient is a 89y old  Female who presents with anemia, received one unit of PRBC on this admission, she was consulted by hematology, they wanted to check flow cytometry, will give Procrit today and plan discharge in 24 hours.   Today pt is comfortable, has no complaints.       Vital Signs Last 24 Hrs  T(C): 36 (06 Sep 2022 13:45), Max: 36 (06 Sep 2022 13:45)  T(F): 96.8 (06 Sep 2022 13:45), Max: 96.8 (06 Sep 2022 13:45)  HR: 66 (06 Sep 2022 13:45) (63 - 66)  BP: 142/77 (06 Sep 2022 13:45) (142/77 - 165/73)  BP(mean): --  RR: 18 (06 Sep 2022 13:45) (18 - 19)  SpO2: --          PHYSICAL EXAM:  GENERAL: NAD, elderly female with temporal muscle waisting   HEAD:  Atraumatic, Normocephalic  NECK: Supple, No JVD, Normal thyroid  NERVOUS SYSTEM:  Alert & Oriented X3, Good concentration; no focal neuro deficit   CHEST/LUNG: Clear to percussion bilaterally  HEART: Regular rate and rhythm; No murmurs, rubs, or gallops  ABDOMEN: Soft, Nontender, Nondistended; Bowel sounds present  EXTREMITIES:  2+ Peripheral Pulses, No clubbing, cyanosis, or edema  LYMPH: No lymphadenopathy noted  SKIN: No rashes or lesions      LABS:                                   8.2    5.54  )-----------( 182      ( 06 Sep 2022 07:23 )             25.7   09-    138  |  108  |  21<H>  ----------------------------<  92  3.9   |  21  |  <0.5<L>    Ca    7.7<L>      06 Sep 2022 07:23  Mg     1.7         TPro  6.3  /  Alb  2.7<L>  /  TBili  0.8  /  DBili  x   /  AST  19  /  ALT  16  /  AlkPhos  71  09-    Urinalysis Basic - ( 04 Sep 2022 22:14 )    Color: Yellow / Appearance: Clear / S.020 / pH: x  Gluc: x / Ketone: Negative  / Bili: Negative / Urobili: 3 mg/dL   Blood: x / Protein: Trace / Nitrite: Negative   Leuk Esterase: Negative / RBC: x / WBC x   Sq Epi: x / Non Sq Epi: x / Bacteria: x    RADIOLOGY & ADDITIONAL TESTS:  < from: US Abdomen Complete (US Abdomen Complete .) (22 @ 10:08) >  IMPRESSION:    Nonobstructing left lower pole 0.3 cm renal calculus.    Otherwise, unremarkable abdominal sonogram.    < end of copied text >    MEDICATIONS  (STANDING):  ascorbic acid 500 milliGRAM(s) Oral daily  atorvastatin 80 milliGRAM(s) Oral at bedtime  calcium carbonate    500 mG (Tums) Chewable 1 Tablet(s) Chew daily  dorzolamide 2% Ophthalmic Solution 2 Drop(s) Both EYES three times a day  folic acid 1 milliGRAM(s) Oral daily  gabapentin 100 milliGRAM(s) Oral three times a day  lactulose Syrup 10 Gram(s) Oral daily  losartan 50 milliGRAM(s) Oral daily  melatonin 5 milliGRAM(s) Oral at bedtime  pantoprazole  Injectable 40 milliGRAM(s) IV Push two times a day  polyethylene glycol 3350 17 Gram(s) Oral daily  senna 8.6 milliGRAM(s) Oral Tablet - Peds 1 Tablet(s) Oral at bedtime    MEDICATIONS  (PRN):  acetaminophen   Oral Tab/Cap - Peds. 650 milliGRAM(s) Oral every 6 hours PRN Temp greater or equal to 38 C (100.4 F), Mild Pain (1 - 3)

## 2022-09-06 NOTE — DISCHARGE NOTE NURSING/CASE MANAGEMENT/SOCIAL WORK - PATIENT PORTAL LINK FT
You can access the FollowMyHealth Patient Portal offered by [facility] by registering at the following website: [email or web address]. By joining [employer]’s FollowMyHealth portal, you will also be able to view your health information using other applications (apps) compatible with our system.

## 2022-09-06 NOTE — PROGRESS NOTE ADULT - TIME BILLING
direct pt's care, communication with medical team, chart review
direct pt's care, communication with medical team, chart review

## 2022-09-06 NOTE — PHYSICAL THERAPY INITIAL EVALUATION ADULT - PERTINENT HX OF CURRENT PROBLEM, REHAB EVAL
89y old  Female who presents with anemia, received one unit of PRBC on this admission, she was consulted by hematology, they wanted to check flow cytometry, will give Procrit today and plan discharge in 24 hours.

## 2022-09-06 NOTE — DISCHARGE NOTE NURSING/CASE MANAGEMENT/SOCIAL WORK - NSDCPEFALRISK_GEN_ALL_CORE
For information on Fall & Injury Prevention, visit: [email or web address] OR  [email or web address] OR  https://www.cdc.gov/steadi/patient.html

## 2022-09-06 NOTE — PHYSICAL THERAPY INITIAL EVALUATION ADULT - GENERAL OBSERVATIONS, REHAB EVAL
Pt encountered in the bed, NAD, agreeable for b/s PT. [place]. fairly to tx. Pt c/o 3/10 pain in her Right shoulder jt RN made aware. Pt left seated with bed in chair mode all needs within reach. ETHAN Burden made aware.

## 2022-09-06 NOTE — PROGRESS NOTE ADULT - ASSESSMENT
89-year-old female with history of chronic anemia, HTN, HLD, GERD previous blood transfusions in the past presents for hemoglobin of 6.8 found on outpatient lab work from nursing home. Patient recently came to the ER was given a transfusion and sent home.  Patient does not have any complaints.  Denies any bleeding hematuria blood in stool abdominal pain nausea vomiting black or tarry stool dizziness lightheadedness syncope or shortness of breath.    A/P   # Chronic anemia / Suspected MDS  - consulted by hematology, recommendations noted, flow cytometry send on 9/5 r/o MDS   - H/H: on admission 7.2   - usually pt gets Procrit 40 000 units on Monday and Thursday ,will give one dose today and change schedule for Tuesday and Friday   - no active bleeding noted   - monitor H/H, keep Hb above 7.5   - chest stool for occult blood   - f/u with hematology as an OP     #HLD  - lipitor 80 mg PO QD    # HTN  - DASH diet   on Losartan     # Constipation   - high fiber diet   - on Laxatives     # GI/DVT prophylaxis     # Dispo: pt is stable for discharge

## 2022-09-09 DIAGNOSIS — K21.9 GASTRO-ESOPHAGEAL REFLUX DISEASE WITHOUT ESOPHAGITIS: ICD-10-CM

## 2022-09-09 DIAGNOSIS — E88.09 OTHER DISORDERS OF PLASMA-PROTEIN METABOLISM, NOT ELSEWHERE CLASSIFIED: ICD-10-CM

## 2022-09-09 DIAGNOSIS — I10 ESSENTIAL (PRIMARY) HYPERTENSION: ICD-10-CM

## 2022-09-09 DIAGNOSIS — D64.9 ANEMIA, UNSPECIFIED: ICD-10-CM

## 2022-09-09 DIAGNOSIS — D53.9 NUTRITIONAL ANEMIA, UNSPECIFIED: ICD-10-CM

## 2022-09-09 DIAGNOSIS — E78.5 HYPERLIPIDEMIA, UNSPECIFIED: ICD-10-CM

## 2022-09-09 DIAGNOSIS — Z20.822 CONTACT WITH AND (SUSPECTED) EXPOSURE TO COVID-19: ICD-10-CM

## 2022-09-09 DIAGNOSIS — D46.9 MYELODYSPLASTIC SYNDROME, UNSPECIFIED: ICD-10-CM

## 2022-09-09 DIAGNOSIS — K59.00 CONSTIPATION, UNSPECIFIED: ICD-10-CM

## 2022-09-10 LAB
CULTURE RESULTS: SIGNIFICANT CHANGE UP
SPECIMEN SOURCE: SIGNIFICANT CHANGE UP

## 2022-09-16 LAB — CHROM ANALY OVERALL INTERP SPEC-IMP: SIGNIFICANT CHANGE UP

## 2022-09-21 PROBLEM — Z00.00 ENCOUNTER FOR PREVENTIVE HEALTH EXAMINATION: Status: ACTIVE | Noted: 2022-09-21

## 2022-10-08 ENCOUNTER — EMERGENCY (EMERGENCY)
Facility: HOSPITAL | Age: 87
LOS: 0 days | Discharge: HOME | End: 2022-10-09
Attending: STUDENT IN AN ORGANIZED HEALTH CARE EDUCATION/TRAINING PROGRAM | Admitting: EMERGENCY MEDICINE

## 2022-10-08 VITALS
DIASTOLIC BLOOD PRESSURE: 63 MMHG | HEART RATE: 68 BPM | OXYGEN SATURATION: 98 % | HEIGHT: 61 IN | RESPIRATION RATE: 18 BRPM | TEMPERATURE: 98 F | SYSTOLIC BLOOD PRESSURE: 138 MMHG

## 2022-10-08 DIAGNOSIS — E78.00 PURE HYPERCHOLESTEROLEMIA, UNSPECIFIED: ICD-10-CM

## 2022-10-08 DIAGNOSIS — D64.9 ANEMIA, UNSPECIFIED: ICD-10-CM

## 2022-10-08 DIAGNOSIS — K21.9 GASTRO-ESOPHAGEAL REFLUX DISEASE WITHOUT ESOPHAGITIS: ICD-10-CM

## 2022-10-08 DIAGNOSIS — I10 ESSENTIAL (PRIMARY) HYPERTENSION: ICD-10-CM

## 2022-10-08 LAB
ALBUMIN SERPL ELPH-MCNC: 3 G/DL — LOW (ref 3.5–5.2)
ALP SERPL-CCNC: 86 U/L — SIGNIFICANT CHANGE UP (ref 30–115)
ALT FLD-CCNC: 13 U/L — SIGNIFICANT CHANGE UP (ref 0–41)
ANION GAP SERPL CALC-SCNC: 8 MMOL/L — SIGNIFICANT CHANGE UP (ref 7–14)
APTT BLD: 27.8 SEC — SIGNIFICANT CHANGE UP (ref 27–39.2)
AST SERPL-CCNC: 15 U/L — SIGNIFICANT CHANGE UP (ref 0–41)
BASOPHILS # BLD AUTO: 0 K/UL — SIGNIFICANT CHANGE UP (ref 0–0.2)
BASOPHILS NFR BLD AUTO: 0 % — SIGNIFICANT CHANGE UP (ref 0–1)
BILIRUB SERPL-MCNC: 0.5 MG/DL — SIGNIFICANT CHANGE UP (ref 0.2–1.2)
BLD GP AB SCN SERPL QL: SIGNIFICANT CHANGE UP
BUN SERPL-MCNC: 23 MG/DL — HIGH (ref 10–20)
BURR CELLS BLD QL SMEAR: PRESENT — SIGNIFICANT CHANGE UP
CALCIUM SERPL-MCNC: 7.4 MG/DL — LOW (ref 8.4–10.5)
CHLORIDE SERPL-SCNC: 108 MMOL/L — SIGNIFICANT CHANGE UP (ref 98–110)
CO2 SERPL-SCNC: 23 MMOL/L — SIGNIFICANT CHANGE UP (ref 17–32)
CREAT SERPL-MCNC: 0.5 MG/DL — LOW (ref 0.7–1.5)
DACRYOCYTES BLD QL SMEAR: SLIGHT — SIGNIFICANT CHANGE UP
EGFR: 85 ML/MIN/1.73M2 — SIGNIFICANT CHANGE UP
ELLIPTOCYTES BLD QL SMEAR: SLIGHT — SIGNIFICANT CHANGE UP
EOSINOPHIL # BLD AUTO: 0 K/UL — SIGNIFICANT CHANGE UP (ref 0–0.7)
EOSINOPHIL NFR BLD AUTO: 0 % — SIGNIFICANT CHANGE UP (ref 0–8)
GLUCOSE SERPL-MCNC: 124 MG/DL — HIGH (ref 70–99)
HCT VFR BLD CALC: 14.1 % — LOW (ref 37–47)
HGB BLD-MCNC: 4.3 G/DL — CRITICAL LOW (ref 12–16)
INR BLD: 1.3 RATIO — SIGNIFICANT CHANGE UP (ref 0.65–1.3)
LYMPHOCYTES # BLD AUTO: 2.76 K/UL — SIGNIFICANT CHANGE UP (ref 1.2–3.4)
LYMPHOCYTES # BLD AUTO: 41 % — SIGNIFICANT CHANGE UP (ref 20.5–51.1)
MACROCYTES BLD QL: SLIGHT — SIGNIFICANT CHANGE UP
MANUAL SMEAR VERIFICATION: SIGNIFICANT CHANGE UP
MCHC RBC-ENTMCNC: 30.3 PG — SIGNIFICANT CHANGE UP (ref 27–31)
MCHC RBC-ENTMCNC: 30.5 G/DL — LOW (ref 32–37)
MCV RBC AUTO: 99.3 FL — HIGH (ref 81–99)
MICROCYTES BLD QL: SLIGHT — SIGNIFICANT CHANGE UP
MONOCYTES # BLD AUTO: 0.81 K/UL — HIGH (ref 0.1–0.6)
MONOCYTES NFR BLD AUTO: 12 % — HIGH (ref 1.7–9.3)
NEUTROPHILS # BLD AUTO: 3.16 K/UL — SIGNIFICANT CHANGE UP (ref 1.4–6.5)
NEUTROPHILS NFR BLD AUTO: 36 % — LOW (ref 42.2–75.2)
NEUTS BAND # BLD: 11 % — HIGH (ref 0–6)
NRBC # BLD: 0 /100 — SIGNIFICANT CHANGE UP (ref 0–0)
NRBC # BLD: SIGNIFICANT CHANGE UP /100 WBCS (ref 0–0)
PLAT MORPH BLD: NORMAL — SIGNIFICANT CHANGE UP
PLATELET # BLD AUTO: 105 K/UL — LOW (ref 130–400)
POTASSIUM SERPL-MCNC: 3.9 MMOL/L — SIGNIFICANT CHANGE UP (ref 3.5–5)
POTASSIUM SERPL-SCNC: 3.9 MMOL/L — SIGNIFICANT CHANGE UP (ref 3.5–5)
PROT SERPL-MCNC: 6.3 G/DL — SIGNIFICANT CHANGE UP (ref 6–8)
PROTHROM AB SERPL-ACNC: 14.9 SEC — HIGH (ref 9.95–12.87)
RBC # BLD: 1.42 M/UL — LOW (ref 4.2–5.4)
RBC # FLD: 24.5 % — HIGH (ref 11.5–14.5)
RBC BLD AUTO: ABNORMAL
SCHISTOCYTES BLD QL AUTO: SLIGHT — SIGNIFICANT CHANGE UP
SODIUM SERPL-SCNC: 139 MMOL/L — SIGNIFICANT CHANGE UP (ref 135–146)
WBC # BLD: 6.72 K/UL — SIGNIFICANT CHANGE UP (ref 4.8–10.8)
WBC # FLD AUTO: 6.72 K/UL — SIGNIFICANT CHANGE UP (ref 4.8–10.8)

## 2022-10-08 PROCEDURE — 99218: CPT

## 2022-10-08 NOTE — ED PROVIDER NOTE - CARE PLAN
1 Principal Discharge DX:	Acute on chronic anemia  Secondary Diagnosis:	MDS (myelodysplastic syndrome)

## 2022-10-08 NOTE — ED PROVIDER NOTE - PHYSICAL EXAMINATION
Physical Exam    Vital Signs: I have reviewed the initial vital signs.  Constitutional: well-nourished, appears stated age, no acute distress  Eyes: Conjunctiva pink, Sclera pale b/l.   Cardiovascular: S1 and S2, regular rate, regular rhythm, well-perfused extremities, radial pulses equal and 2+ b/l.   Respiratory: unlabored respiratory effort, clear to auscultation bilaterally no wheezing, rales and rhonchi. pt is speaking full sentences. no accessory muscle use.   Gastrointestinal: soft, non-tender, nondistended abdomen, no pulsatile mass, normal bowl sounds, no rebound, no guarding,  Musculoskeletal: supple neck, no lower extremity edema, no calf tenderness  Integumentary: warm, dry, no rash  Neurologic: awake, alert, pt a&ox 3.   Psychiatric: appropriate mood, appropriate affect

## 2022-10-08 NOTE — ED ADULT NURSE NOTE - INTERVENTIONS DEFINITIONS
[place] to call system/Call bell, personal items and telephone within reach/Instruct patient to call for assistance/Room bathroom lighting operational/Non-slip footwear when patient is off stretcher/Physically safe environment: no spills, clutter or unnecessary equipment/Stretcher in lowest position, wheels locked, appropriate side rails in place/Monitor gait and stability/Reinforce activity limits and safety measures with patient and family

## 2022-10-08 NOTE — ED PROVIDER NOTE - CLINICAL SUMMARY MEDICAL DECISION MAKING FREE TEXT BOX
89 yo woman with acute on chronic anemia suspected secondary to MDS.  Plan is transfuse in obs slowly and discharge.  Patient already with hematology on board and primary team also aware.  Patient is clinically stable otherwise and no evidence of acute blood loss or hemolysis.

## 2022-10-08 NOTE — ED PROVIDER NOTE - OBJECTIVE STATEMENT
89 y/o female with a PMH of chronic anemia on procrit s/p multiple transfusions, HTN, HLD, and GERD presents to the ED sent in from her NH ([place]) for evaluation of low hemoglobin less than 5. pt denies chest pain, sob, blood in the urine or stool, use of blood thinners, weakness, dizziness, or bruising. I spoke with nurse manager at [facility] who reports pt can get blood transfusion and return to [facility] and reports pt has not had bloody or black stools.

## 2022-10-09 VITALS — SYSTOLIC BLOOD PRESSURE: 172 MMHG | HEART RATE: 67 BPM | DIASTOLIC BLOOD PRESSURE: 67 MMHG

## 2022-10-09 LAB
BASOPHILS # BLD AUTO: 0.02 K/UL — SIGNIFICANT CHANGE UP (ref 0–0.2)
BASOPHILS NFR BLD AUTO: 0.3 % — SIGNIFICANT CHANGE UP (ref 0–1)
EOSINOPHIL # BLD AUTO: 0.15 K/UL — SIGNIFICANT CHANGE UP (ref 0–0.7)
EOSINOPHIL NFR BLD AUTO: 2.2 % — SIGNIFICANT CHANGE UP (ref 0–8)
HCT VFR BLD CALC: 27.4 % — LOW (ref 37–47)
HGB BLD-MCNC: 9.3 G/DL — LOW (ref 12–16)
IMM GRANULOCYTES NFR BLD AUTO: 0.4 % — HIGH (ref 0.1–0.3)
LYMPHOCYTES # BLD AUTO: 2.52 K/UL — SIGNIFICANT CHANGE UP (ref 1.2–3.4)
LYMPHOCYTES # BLD AUTO: 36.3 % — SIGNIFICANT CHANGE UP (ref 20.5–51.1)
MCHC RBC-ENTMCNC: 30.9 PG — SIGNIFICANT CHANGE UP (ref 27–31)
MCHC RBC-ENTMCNC: 33.9 G/DL — SIGNIFICANT CHANGE UP (ref 32–37)
MCV RBC AUTO: 91 FL — SIGNIFICANT CHANGE UP (ref 81–99)
MONOCYTES # BLD AUTO: 0.88 K/UL — HIGH (ref 0.1–0.6)
MONOCYTES NFR BLD AUTO: 12.7 % — HIGH (ref 1.7–9.3)
NEUTROPHILS # BLD AUTO: 3.34 K/UL — SIGNIFICANT CHANGE UP (ref 1.4–6.5)
NEUTROPHILS NFR BLD AUTO: 48.1 % — SIGNIFICANT CHANGE UP (ref 42.2–75.2)
NRBC # BLD: 0 /100 WBCS — SIGNIFICANT CHANGE UP (ref 0–0)
PLATELET # BLD AUTO: 77 K/UL — LOW (ref 130–400)
RBC # BLD: 3.01 M/UL — LOW (ref 4.2–5.4)
RBC # FLD: 18.7 % — HIGH (ref 11.5–14.5)
WBC # BLD: 6.94 K/UL — SIGNIFICANT CHANGE UP (ref 4.8–10.8)
WBC # FLD AUTO: 6.94 K/UL — SIGNIFICANT CHANGE UP (ref 4.8–10.8)

## 2022-10-09 PROCEDURE — 99217: CPT

## 2022-10-09 NOTE — ED CDU PROVIDER INITIAL DAY NOTE - MEDICAL DECISION MAKING DETAILS
.    89 y/o F pmh a noted, chronic anemia on Procrit, sent from NH for low Hb on outpt labs. Pt has no complaints. No acute ED events. Will transfuse, reassess.    .

## 2022-10-09 NOTE — ED CDU PROVIDER DISPOSITION NOTE - CARE PROVIDER_API CALL
Shawnee Bhakta)  Hematology; Internal Medicine; Medical Oncology  [address] [place] [address]  Phone: [phone]  Fax: [phone]  Follow Up Time:

## 2022-10-09 NOTE — ED CDU PROVIDER DISPOSITION NOTE - CLINICAL COURSE
Pt placed in OBS for transfusion. Plan was to receive 4u, but after 3u Hb > 9. Pt observed for hours, no complications. Plan discussed w/ Dr. Pee Waters/ Onc, recc outpt f/up to possibly adjust dose of Procrit. pt stable for dc.      .

## 2022-10-09 NOTE — ED CDU PROVIDER INITIAL DAY NOTE - PHYSICAL EXAMINATION
Physical Exam    Vital Signs: I have reviewed the initial vital signs.  Constitutional: well-nourished, appears stated age, no acute distress  Eyes: Conjunctiva pink, Sclera pale b/l.   ENT: Oropharynx is clear with lesions. uvula midline. no tonsillar erythema, edema, or exudates. no stridor. pta pta. floor of the mouth is soft without pus.   Cardiovascular: S1 and S2, regular rate, regular rhythm, well-perfused extremities, radial pulses equal and 2+ b/l.   Respiratory: unlabored respiratory effort, clear to auscultation bilaterally no wheezing, rales and rhonchi. pt is speaking full sentences. no accessory muscle use.   Gastrointestinal: soft, non-tender, nondistended abdomen, no pulsatile mass, normal bowl sounds, no rebound, no guarding  Musculoskeletal: supple neck, no lower extremity edema, no calf tenderness  Integumentary: warm, dry, no rash  Neurologic: awake, alert, a&o x3  Psychiatric: appropriate mood, appropriate affect

## 2022-10-09 NOTE — ED CDU PROVIDER DISPOSITION NOTE - NSFOLLOWUPINSTRUCTIONS_ED_ALL_ED_FT
Patient initial hemoglobin was 4, now it is 9.3 after 3 units of blood.  Patient remains asymptomatic, discussed case with hematology who recommended close outpatient follow-up with her hematologist for more thorough work-up and possible adjustment of her Procrit dosage which would explain her sudden drop in hemoglobin and this short period of time.  Please arrange for proper follow-up

## 2022-10-09 NOTE — ED CDU PROVIDER INITIAL DAY NOTE - OBJECTIVE STATEMENT
89 y/o female with a PMH of chronic anemia on procrit s/p multiple transfusions, HTN, HLD, and GERD presents to the ED sent in from her NH ([place]) for evaluation of low hemoglobin less than 5. pt denies chest pain, sob, blood in the urine or stool, use of blood thinners, weakness, dizziness, or bruising. I spoke with nurse manager at [facility] who reports pt can get blood transfusion and return to [facility].

## 2022-10-09 NOTE — ED CDU PROVIDER DISPOSITION NOTE - PATIENT PORTAL LINK FT
You can access the FollowMyHealth Patient Portal offered by [facility] by registering at the following website: [email or web address]. By joining [employer]’s FollowMyHealth portal, you will also be able to view your health information using other applications (apps) compatible with our system.

## 2022-10-09 NOTE — ED CDU PROVIDER SUBSEQUENT DAY NOTE - HISTORY
FF: plan for pt to get 4 units of prbc. pt given one. nurse spiked the second bag but it began to leak so needs another order for that second unit. currently 4 units of ordered, but 1 unit is the one that the bag broke and cannot be given. will still need 4 units.

## 2022-10-09 NOTE — ED ADULT NURSE REASSESSMENT NOTE - NS ED NURSE REASSESS COMMENT FT1
Received report from Nat LONG and assumed care at this time. pt is AOX4, NAD noted, denies complain at this time. completed 3 units of PRBC and tolerated well, 4th unit held, need to recheck cbc.
PT COMPLETED 3UNITS OF PRBCS, TOLERATED WELL, NO S/S OF REACTION NOTED, PT AAOX3, AMADOR, RR E4VEN UNLABORED, VSS, NAD.
pt tolerated 2unit of blood well, no s/s of reaction noted, pt aaax3, vss, will cont to monitor

## 2022-10-09 NOTE — ED CDU PROVIDER SUBSEQUENT DAY NOTE - PROGRESS NOTE DETAILS
Repeat hemoglobin 9.3, patient in no distress.  Discussed case with Dr. Glasgow of hematology who recommends close outpatient follow-up for possible bone marrow biopsy and adjustment of her Procrit dosage.  Patient made aware of this and information written on discharge papers to inform staff at nursing facility.

## 2022-10-11 LAB — MANUAL DIF COMMENT BLD-IMP: SIGNIFICANT CHANGE UP

## 2022-10-31 ENCOUNTER — APPOINTMENT (OUTPATIENT)
Dept: HEMATOLOGY ONCOLOGY | Facility: CLINIC | Age: 87
End: 2022-10-31

## 2022-11-01 ENCOUNTER — EMERGENCY (EMERGENCY)
Facility: HOSPITAL | Age: 87
LOS: 0 days | Discharge: NURSING, REHAB OR LONG-TERM CARE | End: 2022-11-02
Attending: STUDENT IN AN ORGANIZED HEALTH CARE EDUCATION/TRAINING PROGRAM | Admitting: STUDENT IN AN ORGANIZED HEALTH CARE EDUCATION/TRAINING PROGRAM

## 2022-11-01 VITALS
TEMPERATURE: 98 F | OXYGEN SATURATION: 99 % | SYSTOLIC BLOOD PRESSURE: 145 MMHG | RESPIRATION RATE: 18 BRPM | HEART RATE: 61 BPM | DIASTOLIC BLOOD PRESSURE: 64 MMHG | HEIGHT: 61 IN

## 2022-11-01 DIAGNOSIS — I10 ESSENTIAL (PRIMARY) HYPERTENSION: ICD-10-CM

## 2022-11-01 DIAGNOSIS — K21.9 GASTRO-ESOPHAGEAL REFLUX DISEASE WITHOUT ESOPHAGITIS: ICD-10-CM

## 2022-11-01 DIAGNOSIS — D64.9 ANEMIA, UNSPECIFIED: ICD-10-CM

## 2022-11-01 DIAGNOSIS — Z86.2 PERSONAL HISTORY OF DISEASES OF THE BLOOD AND BLOOD-FORMING ORGANS AND CERTAIN DISORDERS INVOLVING THE IMMUNE MECHANISM: ICD-10-CM

## 2022-11-01 DIAGNOSIS — Z79.82 LONG TERM (CURRENT) USE OF ASPIRIN: ICD-10-CM

## 2022-11-01 DIAGNOSIS — N39.0 URINARY TRACT INFECTION, SITE NOT SPECIFIED: ICD-10-CM

## 2022-11-01 DIAGNOSIS — E78.5 HYPERLIPIDEMIA, UNSPECIFIED: ICD-10-CM

## 2022-11-01 LAB
ALBUMIN SERPL ELPH-MCNC: 2.9 G/DL — LOW (ref 3.5–5.2)
ALP SERPL-CCNC: 84 U/L — SIGNIFICANT CHANGE UP (ref 30–115)
ALT FLD-CCNC: 16 U/L — SIGNIFICANT CHANGE UP (ref 0–41)
ANION GAP SERPL CALC-SCNC: 12 MMOL/L — SIGNIFICANT CHANGE UP (ref 7–14)
ANISOCYTOSIS BLD QL: SLIGHT — SIGNIFICANT CHANGE UP
APTT BLD: 23.2 SEC — CRITICAL LOW (ref 27–39.2)
AST SERPL-CCNC: 17 U/L — SIGNIFICANT CHANGE UP (ref 0–41)
BASOPHILS # BLD AUTO: 0 K/UL — SIGNIFICANT CHANGE UP (ref 0–0.2)
BASOPHILS NFR BLD AUTO: 0 % — SIGNIFICANT CHANGE UP (ref 0–1)
BILIRUB SERPL-MCNC: 0.6 MG/DL — SIGNIFICANT CHANGE UP (ref 0.2–1.2)
BLD GP AB SCN SERPL QL: SIGNIFICANT CHANGE UP
BUN SERPL-MCNC: 21 MG/DL — HIGH (ref 10–20)
BURR CELLS BLD QL SMEAR: PRESENT — SIGNIFICANT CHANGE UP
CALCIUM SERPL-MCNC: 8.2 MG/DL — LOW (ref 8.4–10.4)
CHLORIDE SERPL-SCNC: 109 MMOL/L — SIGNIFICANT CHANGE UP (ref 98–110)
CO2 SERPL-SCNC: 20 MMOL/L — SIGNIFICANT CHANGE UP (ref 17–32)
CREAT SERPL-MCNC: 0.5 MG/DL — LOW (ref 0.7–1.5)
EGFR: 85 ML/MIN/1.73M2 — SIGNIFICANT CHANGE UP
ELLIPTOCYTES BLD QL SMEAR: SLIGHT — SIGNIFICANT CHANGE UP
EOSINOPHIL # BLD AUTO: 0.51 K/UL — SIGNIFICANT CHANGE UP (ref 0–0.7)
EOSINOPHIL NFR BLD AUTO: 6.2 % — SIGNIFICANT CHANGE UP (ref 0–8)
GIANT PLATELETS BLD QL SMEAR: PRESENT — SIGNIFICANT CHANGE UP
GLUCOSE SERPL-MCNC: 117 MG/DL — HIGH (ref 70–99)
HCT VFR BLD CALC: 21.7 % — LOW (ref 37–47)
HGB BLD-MCNC: 6.9 G/DL — CRITICAL LOW (ref 12–16)
INR BLD: 1.25 RATIO — SIGNIFICANT CHANGE UP (ref 0.65–1.3)
LIDOCAIN IGE QN: 13 U/L — SIGNIFICANT CHANGE UP (ref 7–60)
LYMPHOCYTES # BLD AUTO: 2.57 K/UL — SIGNIFICANT CHANGE UP (ref 1.2–3.4)
LYMPHOCYTES # BLD AUTO: 31 % — SIGNIFICANT CHANGE UP (ref 20.5–51.1)
MANUAL SMEAR VERIFICATION: SIGNIFICANT CHANGE UP
MCHC RBC-ENTMCNC: 30.3 PG — SIGNIFICANT CHANGE UP (ref 27–31)
MCHC RBC-ENTMCNC: 31.8 G/DL — LOW (ref 32–37)
MCV RBC AUTO: 95.2 FL — SIGNIFICANT CHANGE UP (ref 81–99)
METAMYELOCYTES # FLD: 1.8 % — HIGH (ref 0–0)
MICROCYTES BLD QL: SLIGHT — SIGNIFICANT CHANGE UP
MONOCYTES # BLD AUTO: 0.8 K/UL — HIGH (ref 0.1–0.6)
MONOCYTES NFR BLD AUTO: 9.7 % — HIGH (ref 1.7–9.3)
NEUTROPHILS # BLD AUTO: 4.18 K/UL — SIGNIFICANT CHANGE UP (ref 1.4–6.5)
NEUTROPHILS NFR BLD AUTO: 46.9 % — SIGNIFICANT CHANGE UP (ref 42.2–75.2)
NEUTS BAND # BLD: 3.5 % — SIGNIFICANT CHANGE UP (ref 0–6)
PLAT MORPH BLD: ABNORMAL
PLATELET # BLD AUTO: 88 K/UL — LOW (ref 130–400)
POIKILOCYTOSIS BLD QL AUTO: SIGNIFICANT CHANGE UP
POLYCHROMASIA BLD QL SMEAR: SLIGHT — SIGNIFICANT CHANGE UP
POTASSIUM SERPL-MCNC: 3.8 MMOL/L — SIGNIFICANT CHANGE UP (ref 3.5–5)
POTASSIUM SERPL-SCNC: 3.8 MMOL/L — SIGNIFICANT CHANGE UP (ref 3.5–5)
PROT SERPL-MCNC: 6.2 G/DL — SIGNIFICANT CHANGE UP (ref 6–8)
PROTHROM AB SERPL-ACNC: 14.4 SEC — HIGH (ref 9.95–12.87)
RBC # BLD: 2.28 M/UL — LOW (ref 4.2–5.4)
RBC # FLD: 18.8 % — HIGH (ref 11.5–14.5)
RBC BLD AUTO: ABNORMAL
SODIUM SERPL-SCNC: 141 MMOL/L — SIGNIFICANT CHANGE UP (ref 135–146)
VARIANT LYMPHS # BLD: 0.9 % — SIGNIFICANT CHANGE UP (ref 0–5)
WBC # BLD: 8.29 K/UL — SIGNIFICANT CHANGE UP (ref 4.8–10.8)
WBC # FLD AUTO: 8.29 K/UL — SIGNIFICANT CHANGE UP (ref 4.8–10.8)

## 2022-11-01 PROCEDURE — 71045 X-RAY EXAM CHEST 1 VIEW: CPT | Mod: 26

## 2022-11-01 PROCEDURE — 99284 EMERGENCY DEPT VISIT MOD MDM: CPT

## 2022-11-01 NOTE — ED ADULT NURSE NOTE - NSIMPLEMENTINTERV_GEN_ALL_ED
Implemented All Fall with Harm Risk Interventions:  [place] to call system. Call bell, personal items and telephone within reach. Instruct patient to call for assistance. Room bathroom lighting operational. Non-slip footwear when patient is off stretcher. Physically safe environment: no spills, clutter or unnecessary equipment. Stretcher in lowest position, wheels locked, appropriate side rails in place. Provide visual cue, wrist band, yellow gown, etc. Monitor gait and stability. Monitor for mental status changes and reorient to person, place, and time. Review medications for side effects contributing to fall risk. Reinforce activity limits and safety measures with patient and family. Provide visual clues: red socks.

## 2022-11-01 NOTE — ED ADULT NURSE REASSESSMENT NOTE - NS ED NURSE REASSESS COMMENT FT1
Assumed care from previous nurse Mayrcarmen c/o abnormal result low hemoglobin level 	Patient sent in from [facility] for Hgb 6.2- denies any SOB. Pt alert, awake , follows commands , with order of one unit PRBC for hemoglobin level of 6.9 , , awaiting for  blood availability , fall alarm on, will call blood bank again

## 2022-11-01 NOTE — ED PROVIDER NOTE - NSFOLLOWUPINSTRUCTIONS_ED_ALL_ED_FT
You were transfused 1 unit of red blood cells.   You need You were transfused 1 unit of red blood cells.   Start taking cefpodoxime 100mg PO BID for 7 days for UTI.    Anemia    Anemia is a condition in which the concentration of red blood cells or hemoglobin in the blood is below normal. Hemoglobin is a substance in red blood cells that carries oxygen to the tissues of the body. Anemia results in not enough oxygen reaching these tissues which can cause symptoms such as weakness, dizziness/lightheadedness, shortness of breath, chest pain, paleness, or nausea. The cause of your anemia may or may not be determined immediately. If your hemoglobin was dangerously low, you may have received a blood transfusion. Usually reactions to transfusions occur immediately but monitor yourself for any fevers, rash, or shortness of breath.    SEEK IMMEDIATE MEDICAL CARE IF YOU HAVE ANY OF THE FOLLOWING SYMPTOMS: extreme weakness/chest pain/shortness of breath, black or bloody stools, vomiting blood, fainting, fever, or any signs of dehydration.    Urinary Tract Infection    A urinary tract infection (UTI) is an infection of any part of the urinary tract, which includes the kidneys, ureters, bladder, and urethra. Risk factors include ignoring your need to urinate, wiping back to front if female, being an uncircumcised male, and having diabetes or a weak immune system. Symptoms include frequent urination, pain or burning with urination, foul smelling urine, cloudy urine, pain in the lower abdomen, blood in the urine, and fever. If you were prescribed an antibiotic medicine, take it as told by your health care provider. Do not stop taking the antibiotic even if you start to feel better.    SEEK IMMEDIATE MEDICAL CARE IF YOU HAVE ANY OF THE FOLLOWING SYMPTOMS: severe back or abdominal pain, fever, inability to keep fluids or medicine down, dizziness/lightheadedness, or a change in mental status.

## 2022-11-01 NOTE — ED ADULT NURSE NOTE - OBJECTIVE STATEMENT
pt presents to the ed from NH for abnormal lab of low hemoglobin. Pt denies dark stool, hematuria or vomiting blood. Pt was noted to have b/l upper extremity ecchymosis, +3 edema noted on top b/l foot and b/l fine crackles noted. Pt denies SOB, chest pain or palpitations. Pt has been placed on cardiac monitor. Pt states she is cold and blankets were provided.

## 2022-11-01 NOTE — ED ADULT TRIAGE NOTE - CHIEF COMPLAINT QUOTE
Patient sent in from [facility] for Hgb 6.2- denies any SOB.

## 2022-11-01 NOTE — ED PROVIDER NOTE - PROGRESS NOTE DETAILS
progressive anemia, possible MDS per previous notes, no e/o acute bleed  screening labs, cxr/ua given temp 99.9F rectally. abd benign, transfuse

## 2022-11-01 NOTE — ED PROVIDER NOTE - CLINICAL SUMMARY MEDICAL DECISION MAKING FREE TEXT BOX
pt transfused, pt stable after transfusion. no sob or increased wob. ua borderline. pt treated w/ abx. attempted to send abx to pt's pharmacy or print but technical issues making that not possible. will provide call back number to [facility] staff to discuss abx rx pt transfused, pt stable after transfusion. no sob or increased wob. ua borderline. pt treated w/ abx. discussed in papers to take antibiotics

## 2022-11-01 NOTE — ED PROVIDER NOTE - OBJECTIVE STATEMENT
89 y/o female with a PMH of chronic anemia on procrit s/p multiple transfusions, HTN, HLD, and GERD   pt presents for eval of anemia. pt does not have complaints. anemia picked up on routine labs. no black/bloody stools. no f/c/cough/ap/dysuria.

## 2022-11-01 NOTE — ED PROVIDER NOTE - PATIENT PORTAL LINK FT
You can access the FollowMyHealth Patient Portal offered by [facility] by registering at the following website: [email or web address]. By joining [employer]’s FollowMyHealth portal, you will also be able to view your health information using other applications (apps) compatible with our system.

## 2022-11-01 NOTE — ED PROVIDER NOTE - PHYSICAL EXAMINATION
PHYSICAL EXAM:    Constitutional: awake, alert, NAD  Eyes: EOMI, no conj injection  HENT: NC AT  Respiratory: no respiratory distress, breath sounds equal b/l, no wheezing, rhonchi or stridor.   Cardiovascular: RRR nml S1S2  Gastrointestinal: soft, no masses, nontender, nondistended. No guarding or rebound.  rectal exam w/ brown stool   Neurological: AAOx3, CN II-XII grossly intact, no focal numbness or focal weakness  Skin: no rash  Musculoskeletal: no gross deformity

## 2022-11-02 VITALS
HEART RATE: 60 BPM | OXYGEN SATURATION: 99 % | TEMPERATURE: 99 F | RESPIRATION RATE: 19 BRPM | DIASTOLIC BLOOD PRESSURE: 63 MMHG | SYSTOLIC BLOOD PRESSURE: 147 MMHG

## 2022-11-02 LAB
APPEARANCE UR: ABNORMAL
BACTERIA # UR AUTO: ABNORMAL
BILIRUB UR-MCNC: NEGATIVE — SIGNIFICANT CHANGE UP
COLOR SPEC: YELLOW — SIGNIFICANT CHANGE UP
DIFF PNL FLD: NEGATIVE — SIGNIFICANT CHANGE UP
EPI CELLS # UR: 2 /HPF — SIGNIFICANT CHANGE UP (ref 0–5)
GLUCOSE UR QL: NEGATIVE — SIGNIFICANT CHANGE UP
HYALINE CASTS # UR AUTO: 0 /LPF — SIGNIFICANT CHANGE UP (ref 0–7)
KETONES UR-MCNC: SIGNIFICANT CHANGE UP
LEUKOCYTE ESTERASE UR-ACNC: ABNORMAL
NITRITE UR-MCNC: NEGATIVE — SIGNIFICANT CHANGE UP
PH UR: 6 — SIGNIFICANT CHANGE UP (ref 5–8)
PROT UR-MCNC: ABNORMAL
RBC CASTS # UR COMP ASSIST: 1 /HPF — SIGNIFICANT CHANGE UP (ref 0–4)
SP GR SPEC: 1.02 — SIGNIFICANT CHANGE UP (ref 1.01–1.03)
UROBILINOGEN FLD QL: ABNORMAL
WBC UR QL: 9 /HPF — HIGH (ref 0–5)

## 2022-11-02 RX ORDER — CEFTRIAXONE 500 MG/1
1000 INJECTION, POWDER, FOR SOLUTION INTRAMUSCULAR; INTRAVENOUS ONCE
Refills: 0 | Status: COMPLETED | OUTPATIENT
Start: 2022-11-02 | End: 2022-11-02

## 2022-11-02 RX ORDER — CEFPODOXIME PROXETIL 100 MG
1 TABLET ORAL
Qty: 14 | Refills: 0
Start: 2022-11-02 | End: 2022-11-08

## 2022-11-02 RX ADMIN — CEFTRIAXONE 100 MILLIGRAM(S): 500 INJECTION, POWDER, FOR SOLUTION INTRAMUSCULAR; INTRAVENOUS at 06:00

## 2022-11-02 NOTE — ED ADULT NURSE REASSESSMENT NOTE - NS ED NURSE REASSESS COMMENT FT1
Pt assessed, alert and oriented x4, VSS. Pt denies pain/discomfort at this time, awaiting transport home. No acute distress noted. Safety and comfort measures maintained. Will continue to monitor

## 2022-11-02 NOTE — ED ADULT NURSE REASSESSMENT NOTE - NS ED NURSE REASSESS COMMENT FT1
Pt asking about her personal wheelchair. Spoke to Supervisor Maryellen at [facility], who states patient did not go to the hospital with wheelchair and it is at [facility]. She states to tell the patient "maryellen has the wheelchair." Patient informed about her wheelchair.

## 2022-11-16 ENCOUNTER — INPATIENT (INPATIENT)
Facility: HOSPITAL | Age: 87
LOS: 2 days | Discharge: NURSING, REHAB OR LONG-TERM CARE | End: 2022-11-19
Attending: HOSPITALIST | Admitting: HOSPITALIST

## 2022-11-16 VITALS
HEIGHT: 61 IN | RESPIRATION RATE: 18 BRPM | OXYGEN SATURATION: 97 % | DIASTOLIC BLOOD PRESSURE: 90 MMHG | HEART RATE: 70 BPM | SYSTOLIC BLOOD PRESSURE: 134 MMHG | TEMPERATURE: 98 F

## 2022-11-16 LAB
ALBUMIN SERPL ELPH-MCNC: 3.6 G/DL — SIGNIFICANT CHANGE UP (ref 3.5–5.2)
ALP SERPL-CCNC: 100 U/L — SIGNIFICANT CHANGE UP (ref 30–115)
ALT FLD-CCNC: 14 U/L — SIGNIFICANT CHANGE UP (ref 0–41)
ANION GAP SERPL CALC-SCNC: 10 MMOL/L — SIGNIFICANT CHANGE UP (ref 7–14)
ANISOCYTOSIS BLD QL: SLIGHT — SIGNIFICANT CHANGE UP
AST SERPL-CCNC: 17 U/L — SIGNIFICANT CHANGE UP (ref 0–41)
BASE EXCESS BLDV CALC-SCNC: -1.8 MMOL/L — SIGNIFICANT CHANGE UP (ref -2–3)
BASOPHILS # BLD AUTO: 0 K/UL — SIGNIFICANT CHANGE UP (ref 0–0.2)
BASOPHILS NFR BLD AUTO: 0 % — SIGNIFICANT CHANGE UP (ref 0–1)
BILIRUB SERPL-MCNC: 1.3 MG/DL — HIGH (ref 0.2–1.2)
BLD GP AB SCN SERPL QL: SIGNIFICANT CHANGE UP
BUN SERPL-MCNC: 20 MG/DL — SIGNIFICANT CHANGE UP (ref 10–20)
CA-I SERPL-SCNC: 1.08 MMOL/L — LOW (ref 1.15–1.33)
CALCIUM SERPL-MCNC: 8.1 MG/DL — LOW (ref 8.4–10.5)
CHLORIDE SERPL-SCNC: 104 MMOL/L — SIGNIFICANT CHANGE UP (ref 98–110)
CO2 SERPL-SCNC: 22 MMOL/L — SIGNIFICANT CHANGE UP (ref 17–32)
CREAT SERPL-MCNC: 0.6 MG/DL — LOW (ref 0.7–1.5)
EGFR: 81 ML/MIN/1.73M2 — SIGNIFICANT CHANGE UP
EOSINOPHIL # BLD AUTO: 0.09 K/UL — SIGNIFICANT CHANGE UP (ref 0–0.7)
EOSINOPHIL NFR BLD AUTO: 0.9 % — SIGNIFICANT CHANGE UP (ref 0–8)
GAS PNL BLDV: 131 MMOL/L — LOW (ref 136–145)
GAS PNL BLDV: SIGNIFICANT CHANGE UP
GIANT PLATELETS BLD QL SMEAR: PRESENT — SIGNIFICANT CHANGE UP
GLUCOSE SERPL-MCNC: 140 MG/DL — HIGH (ref 70–99)
HCO3 BLDV-SCNC: 23 MMOL/L — SIGNIFICANT CHANGE UP (ref 22–29)
HCT VFR BLD CALC: 23 % — LOW (ref 37–47)
HCT VFR BLDA CALC: 55 % — HIGH (ref 39–51)
HGB BLD CALC-MCNC: 18.3 G/DL — HIGH (ref 12.6–17.4)
HGB BLD-MCNC: 7.5 G/DL — LOW (ref 12–16)
LACTATE BLDV-MCNC: 1.6 MMOL/L — SIGNIFICANT CHANGE UP (ref 0.5–2)
LACTATE SERPL-SCNC: 3.2 MMOL/L — HIGH (ref 0.7–2)
LYMPHOCYTES # BLD AUTO: 3.11 K/UL — SIGNIFICANT CHANGE UP (ref 1.2–3.4)
LYMPHOCYTES # BLD AUTO: 31.3 % — SIGNIFICANT CHANGE UP (ref 20.5–51.1)
MACROCYTES BLD QL: SLIGHT — SIGNIFICANT CHANGE UP
MANUAL SMEAR VERIFICATION: SIGNIFICANT CHANGE UP
MCHC RBC-ENTMCNC: 31.5 PG — HIGH (ref 27–31)
MCHC RBC-ENTMCNC: 32.6 G/DL — SIGNIFICANT CHANGE UP (ref 32–37)
MCV RBC AUTO: 96.6 FL — SIGNIFICANT CHANGE UP (ref 81–99)
METAMYELOCYTES # FLD: 0.9 % — HIGH (ref 0–0)
MONOCYTES # BLD AUTO: 0.69 K/UL — HIGH (ref 0.1–0.6)
MONOCYTES NFR BLD AUTO: 6.9 % — SIGNIFICANT CHANGE UP (ref 1.7–9.3)
NEUTROPHILS # BLD AUTO: 5.79 K/UL — SIGNIFICANT CHANGE UP (ref 1.4–6.5)
NEUTROPHILS NFR BLD AUTO: 54.8 % — SIGNIFICANT CHANGE UP (ref 42.2–75.2)
NEUTS BAND # BLD: 3.5 % — SIGNIFICANT CHANGE UP (ref 0–6)
NRBC # BLD: 2 /100 — HIGH (ref 0–0)
NRBC # BLD: SIGNIFICANT CHANGE UP /100 WBCS (ref 0–0)
PCO2 BLDV: 39 MMHG — SIGNIFICANT CHANGE UP (ref 39–42)
PH BLDV: 7.38 — SIGNIFICANT CHANGE UP (ref 7.32–7.43)
PLAT MORPH BLD: NORMAL — SIGNIFICANT CHANGE UP
PLATELET # BLD AUTO: 97 K/UL — LOW (ref 130–400)
PO2 BLDV: 42 MMHG — SIGNIFICANT CHANGE UP
POIKILOCYTOSIS BLD QL AUTO: SLIGHT — SIGNIFICANT CHANGE UP
POLYCHROMASIA BLD QL SMEAR: SLIGHT — SIGNIFICANT CHANGE UP
POTASSIUM BLDV-SCNC: 3.5 MMOL/L — SIGNIFICANT CHANGE UP (ref 3.5–5.1)
POTASSIUM SERPL-MCNC: 4.1 MMOL/L — SIGNIFICANT CHANGE UP (ref 3.5–5)
POTASSIUM SERPL-SCNC: 4.1 MMOL/L — SIGNIFICANT CHANGE UP (ref 3.5–5)
PROT SERPL-MCNC: 7.4 G/DL — SIGNIFICANT CHANGE UP (ref 6–8)
RBC # BLD: 2.38 M/UL — LOW (ref 4.2–5.4)
RBC # FLD: 20.7 % — HIGH (ref 11.5–14.5)
RBC BLD AUTO: ABNORMAL
SAO2 % BLDV: 75.9 % — SIGNIFICANT CHANGE UP
SARS-COV-2 RNA SPEC QL NAA+PROBE: SIGNIFICANT CHANGE UP
SODIUM SERPL-SCNC: 136 MMOL/L — SIGNIFICANT CHANGE UP (ref 135–146)
TROPONIN T SERPL-MCNC: <0.01 NG/ML — SIGNIFICANT CHANGE UP
VARIANT LYMPHS # BLD: 1.7 % — SIGNIFICANT CHANGE UP (ref 0–5)
WBC # BLD: 9.93 K/UL — SIGNIFICANT CHANGE UP (ref 4.8–10.8)
WBC # FLD AUTO: 9.93 K/UL — SIGNIFICANT CHANGE UP (ref 4.8–10.8)

## 2022-11-16 PROCEDURE — 99285 EMERGENCY DEPT VISIT HI MDM: CPT

## 2022-11-16 PROCEDURE — 71275 CT ANGIOGRAPHY CHEST: CPT | Mod: 26,MA

## 2022-11-16 PROCEDURE — 71101 X-RAY EXAM UNILAT RIBS/CHEST: CPT | Mod: 26,LT

## 2022-11-16 PROCEDURE — 93010 ELECTROCARDIOGRAM REPORT: CPT

## 2022-11-16 RX ORDER — SODIUM CHLORIDE 9 MG/ML
500 INJECTION INTRAMUSCULAR; INTRAVENOUS; SUBCUTANEOUS ONCE
Refills: 0 | Status: COMPLETED | OUTPATIENT
Start: 2022-11-16 | End: 2022-11-16

## 2022-11-16 RX ORDER — ACETAMINOPHEN 500 MG
650 TABLET ORAL ONCE
Refills: 0 | Status: COMPLETED | OUTPATIENT
Start: 2022-11-16 | End: 2022-11-16

## 2022-11-16 RX ORDER — AZITHROMYCIN 500 MG/1
500 TABLET, FILM COATED ORAL ONCE
Refills: 0 | Status: COMPLETED | OUTPATIENT
Start: 2022-11-16 | End: 2022-11-16

## 2022-11-16 RX ORDER — CEFTRIAXONE 500 MG/1
1000 INJECTION, POWDER, FOR SOLUTION INTRAMUSCULAR; INTRAVENOUS ONCE
Refills: 0 | Status: COMPLETED | OUTPATIENT
Start: 2022-11-16 | End: 2022-11-16

## 2022-11-16 RX ADMIN — Medication 650 MILLIGRAM(S): at 20:18

## 2022-11-16 RX ADMIN — SODIUM CHLORIDE 500 MILLILITER(S): 9 INJECTION INTRAMUSCULAR; INTRAVENOUS; SUBCUTANEOUS at 21:34

## 2022-11-16 NOTE — ED ADULT NURSE NOTE - OBJECTIVE STATEMENT
referred to ED from skilled nursing facility for low hemoglobin. Patient endorses right hip pain, denies falling.

## 2022-11-16 NOTE — ED ADULT NURSE NOTE - NSIMPLEMENTINTERV_GEN_ALL_ED
Implemented All Fall with Harm Risk Interventions:  [place] to call system. Call bell, personal items and telephone within reach. Instruct patient to call for assistance. Room bathroom lighting operational. Non-slip footwear when patient is off stretcher. Physically safe environment: no spills, clutter or unnecessary equipment. Stretcher in lowest position, wheels locked, appropriate side rails in place. Provide visual cue, wrist band, yellow gown, etc. Monitor gait and stability. Monitor for mental status changes and reorient to person, place, and time. Review medications for side effects contributing to fall risk. Reinforce activity limits and safety measures with patient and family. Provide visual clues: red socks.

## 2022-11-16 NOTE — ED PROVIDER NOTE - PHYSICAL EXAMINATION
CONSTITUTIONAL: Well-appearing; well-nourished; in no apparent distress.   EYES: PERRL; EOM intact.   ENT: normal nose; no rhinorrhea; normal pharynx with no tonsillar hypertrophy.   NECK: Supple; non-tender; no cervical lymphadenopathy.   CARDIOVASCULAR: Normal S1, S2; no murmurs, rubs, or gallops. Equal radial pulses  RESPIRATORY: Normal chest excursion with respiration; breath sounds clear and equal bilaterally; no wheezes, rhonchi, or rales.  GI/: Normal bowel sounds; non-distended; non-tender; no palpable organomegaly.   MS: No evidence of trauma or deformity. + mild L sided rib ttp.  distal pulses are normal.   SKIN: Normal for age and race; warm; dry; good turgor; no apparent lesions or exudate.   NEURO/PSYCH: A & O x 3; grossly unremarkable. mood and manner are appropriate.

## 2022-11-16 NOTE — ED PROVIDER NOTE - CLINICAL SUMMARY MEDICAL DECISION MAKING FREE TEXT BOX
Signout received from Dr. Doyle.  Patient presented for evaluation of anemia and with new left upper back pain.  Required labs, EKG, imaging.  CTA chest showed left upper and right lower pneumonia.  We will start patient on antibiotics and admitted for further management.

## 2022-11-16 NOTE — ED PROVIDER NOTE - ATTENDING APP SHARED VISIT CONTRIBUTION OF CARE
89-year-old female with a past medical history of chronic anemia, status post multiple transfusion, hypertension, hyperlipidemia sent from nursing home for anemia with hemoglobin 5.7 on outpatient labs.  Also states that on her way here approximately 1 hour prior to arrival started to get left-sided rib pain.  Denies trauma or twisting motion that may have precipitated the pain.  States that it "is not chest pain.  "Denies shortness of breath, fever or cough.  No new lower extremity swelling.  No nausea or vomiting.  On exam nontoxic, vital signs stable, heart regular with systolic murmur, lungs clear bilaterally no wheezes, rales or rhonchi.  2+ bilateral lower extremity symmetric pitting edema, abdomen benign.  Labs with hemoglobin 7.5 similar to prior baselines, given this do not feel she needs transfusion.  Does have persistent discomfort there especially when she breathes and possible consolidation versus persistent left elevated hemidiaphragm versus pleural effusion on chest x-ray with no obvious rib fracture but will get CT to further delineate.  EKG pending.  Minimal lactic acidosis, will give IV fluids and reassess.  Defer antibiotics pending imaging. 89-year-old female with a past medical history of chronic anemia, status post multiple transfusion, hypertension, hyperlipidemia sent from nursing home for anemia with hemoglobin 5.7 on outpatient labs.  Also states that on her way here approximately 1 hour prior to arrival started to get left-sided rib pain.  Denies trauma or twisting motion that may have precipitated the pain.  States that it "is not chest pain.  "Denies shortness of breath, fever or cough.  No new lower extremity swelling.  No nausea or vomiting.  On exam nontoxic, vital signs stable, heart regular with systolic murmur, lungs clear bilaterally no wheezes, rales or rhonchi.  2+ bilateral lower extremity symmetric pitting edema, abdomen benign.  Labs with hemoglobin 7.5 similar to prior baselines, given this do not feel she needs transfusion.  Does have persistent discomfort there especially when she breathes and possible consolidation versus persistent left elevated hemidiaphragm versus pleural effusion on chest x-ray with no obvious rib fracture but will get CT to further delineate.  EKG with old inferior TWI, no new changes.   Minimal lactic acidosis, will give IV fluids and reassess.  Defer antibiotics pending imaging.

## 2022-11-16 NOTE — ED PROVIDER NOTE - NS ED ROS FT
Constitutional: no fever, chills, no recent weight loss, change in appetite or malaise  Eyes: no redness/discharge/pain/vision changes  ENT: no rhinorrhea/ear pain/sore throat  Cardiac: No chest pain, SOB or edema.  Respiratory: No cough or respiratory distress  GI: No nausea, vomiting, diarrhea or abdominal pain.  : No dysuria, frequency, urgency or hematuria  MS: + L sided rib pain. no loss of ROM, no weakness  Neuro: No headache or weakness. No LOC.  Skin: No skin rash.  Endocrine: No history of thyroid disease or diabetes.  Except as documented in the HPI, all other systems are negative.

## 2022-11-16 NOTE — ED PROVIDER NOTE - PROGRESS NOTE DETAILS
Joe: Signout received from Dr. Doyle.  Patient presented for evaluation of anemia and with new left upper back pain.  Required labs, EKG, imaging.  CTA chest showed left upper and right lower pneumonia.  We will start patient on antibiotics and admitted for further management. meliza: patient endorsed to me by keith thomas pending ct read. patient with +pna, persistent pain, will admit for iv abx

## 2022-11-16 NOTE — ED PROVIDER NOTE - OBJECTIVE STATEMENT
89 year old F with hx of chronic anemia on procrit s/p mult transf, htn, hld sent in from [facility] for anemia found to have hgb 5.7. Pt also c/o L sided rib pain worse with palpation. She denies any trauma/injuries, chest pain, sob, cough, fever/chills, leg pain/swelling, nausea, vomiting, diarrhea, weakness, lightheadedness/syncope.

## 2022-11-17 PROBLEM — D64.9 ANEMIA, UNSPECIFIED: Chronic | Status: ACTIVE | Noted: 2022-11-01

## 2022-11-17 PROBLEM — I10 ESSENTIAL (PRIMARY) HYPERTENSION: Chronic | Status: ACTIVE | Noted: 2022-11-01

## 2022-11-17 LAB
ALBUMIN SERPL ELPH-MCNC: 2.7 G/DL — LOW (ref 3.5–5.2)
ALP SERPL-CCNC: 83 U/L — SIGNIFICANT CHANGE UP (ref 30–115)
ALT FLD-CCNC: 12 U/L — SIGNIFICANT CHANGE UP (ref 0–41)
ANION GAP SERPL CALC-SCNC: 9 MMOL/L — SIGNIFICANT CHANGE UP (ref 7–14)
AST SERPL-CCNC: 14 U/L — SIGNIFICANT CHANGE UP (ref 0–41)
BASOPHILS # BLD AUTO: 0.02 K/UL — SIGNIFICANT CHANGE UP (ref 0–0.2)
BASOPHILS # BLD AUTO: 0.02 K/UL — SIGNIFICANT CHANGE UP (ref 0–0.2)
BASOPHILS NFR BLD AUTO: 0.3 % — SIGNIFICANT CHANGE UP (ref 0–1)
BASOPHILS NFR BLD AUTO: 0.3 % — SIGNIFICANT CHANGE UP (ref 0–1)
BILIRUB SERPL-MCNC: 0.6 MG/DL — SIGNIFICANT CHANGE UP (ref 0.2–1.2)
BLD GP AB SCN SERPL QL: SIGNIFICANT CHANGE UP
BUN SERPL-MCNC: 19 MG/DL — SIGNIFICANT CHANGE UP (ref 10–20)
CALCIUM SERPL-MCNC: 7.5 MG/DL — LOW (ref 8.4–10.5)
CHLORIDE SERPL-SCNC: 106 MMOL/L — SIGNIFICANT CHANGE UP (ref 98–110)
CO2 SERPL-SCNC: 24 MMOL/L — SIGNIFICANT CHANGE UP (ref 17–32)
CREAT SERPL-MCNC: 0.5 MG/DL — LOW (ref 0.7–1.5)
EGFR: 85 ML/MIN/1.73M2 — SIGNIFICANT CHANGE UP
EOSINOPHIL # BLD AUTO: 0.15 K/UL — SIGNIFICANT CHANGE UP (ref 0–0.7)
EOSINOPHIL # BLD AUTO: 0.29 K/UL — SIGNIFICANT CHANGE UP (ref 0–0.7)
EOSINOPHIL NFR BLD AUTO: 2 % — SIGNIFICANT CHANGE UP (ref 0–8)
EOSINOPHIL NFR BLD AUTO: 3.9 % — SIGNIFICANT CHANGE UP (ref 0–8)
GLUCOSE SERPL-MCNC: 94 MG/DL — SIGNIFICANT CHANGE UP (ref 70–99)
HCT VFR BLD CALC: 16.9 % — LOW (ref 37–47)
HCT VFR BLD CALC: 19.4 % — LOW (ref 37–47)
HCT VFR BLD CALC: 24.4 % — LOW (ref 37–47)
HGB BLD-MCNC: 5.5 G/DL — CRITICAL LOW (ref 12–16)
HGB BLD-MCNC: 6.1 G/DL — CRITICAL LOW (ref 12–16)
HGB BLD-MCNC: 7.9 G/DL — LOW (ref 12–16)
IMM GRANULOCYTES NFR BLD AUTO: 0.8 % — HIGH (ref 0.1–0.3)
IMM GRANULOCYTES NFR BLD AUTO: 0.9 % — HIGH (ref 0.1–0.3)
LDH SERPL L TO P-CCNC: 214 — SIGNIFICANT CHANGE UP (ref 50–242)
LYMPHOCYTES # BLD AUTO: 2.76 K/UL — SIGNIFICANT CHANGE UP (ref 1.2–3.4)
LYMPHOCYTES # BLD AUTO: 2.86 K/UL — SIGNIFICANT CHANGE UP (ref 1.2–3.4)
LYMPHOCYTES # BLD AUTO: 36 % — SIGNIFICANT CHANGE UP (ref 20.5–51.1)
LYMPHOCYTES # BLD AUTO: 38.2 % — SIGNIFICANT CHANGE UP (ref 20.5–51.1)
MAGNESIUM SERPL-MCNC: 1.7 MG/DL — LOW (ref 1.8–2.4)
MCHC RBC-ENTMCNC: 30.5 PG — SIGNIFICANT CHANGE UP (ref 27–31)
MCHC RBC-ENTMCNC: 31 PG — SIGNIFICANT CHANGE UP (ref 27–31)
MCHC RBC-ENTMCNC: 31.4 G/DL — LOW (ref 32–37)
MCHC RBC-ENTMCNC: 31.6 PG — HIGH (ref 27–31)
MCHC RBC-ENTMCNC: 32.4 G/DL — SIGNIFICANT CHANGE UP (ref 32–37)
MCHC RBC-ENTMCNC: 32.5 G/DL — SIGNIFICANT CHANGE UP (ref 32–37)
MCV RBC AUTO: 94.2 FL — SIGNIFICANT CHANGE UP (ref 81–99)
MCV RBC AUTO: 97.1 FL — SIGNIFICANT CHANGE UP (ref 81–99)
MCV RBC AUTO: 98.5 FL — SIGNIFICANT CHANGE UP (ref 81–99)
MONOCYTES # BLD AUTO: 1.13 K/UL — HIGH (ref 0.1–0.6)
MONOCYTES # BLD AUTO: 1.27 K/UL — HIGH (ref 0.1–0.6)
MONOCYTES NFR BLD AUTO: 15.1 % — HIGH (ref 1.7–9.3)
MONOCYTES NFR BLD AUTO: 16.6 % — HIGH (ref 1.7–9.3)
NEUTROPHILS # BLD AUTO: 3.13 K/UL — SIGNIFICANT CHANGE UP (ref 1.4–6.5)
NEUTROPHILS # BLD AUTO: 3.4 K/UL — SIGNIFICANT CHANGE UP (ref 1.4–6.5)
NEUTROPHILS NFR BLD AUTO: 41.7 % — LOW (ref 42.2–75.2)
NEUTROPHILS NFR BLD AUTO: 44.2 % — SIGNIFICANT CHANGE UP (ref 42.2–75.2)
NRBC # BLD: 0 /100 WBCS — SIGNIFICANT CHANGE UP (ref 0–0)
NRBC # BLD: 0 /100 WBCS — SIGNIFICANT CHANGE UP (ref 0–0)
NRBC # BLD: 1 /100 WBCS — HIGH (ref 0–0)
PLATELET # BLD AUTO: 64 K/UL — LOW (ref 130–400)
PLATELET # BLD AUTO: 70 K/UL — LOW (ref 130–400)
PLATELET # BLD AUTO: 72 K/UL — LOW (ref 130–400)
POTASSIUM SERPL-MCNC: 3.4 MMOL/L — LOW (ref 3.5–5)
POTASSIUM SERPL-SCNC: 3.4 MMOL/L — LOW (ref 3.5–5)
PROCALCITONIN SERPL-MCNC: 0.24 NG/ML — HIGH (ref 0.02–0.1)
PROT SERPL-MCNC: 5.8 G/DL — LOW (ref 6–8)
RBC # BLD: 1.74 M/UL — LOW (ref 4.2–5.4)
RBC # BLD: 1.77 M/UL — LOW (ref 4.2–5.4)
RBC # BLD: 1.97 M/UL — LOW (ref 4.2–5.4)
RBC # BLD: 2.59 M/UL — LOW (ref 4.2–5.4)
RBC # FLD: 18.4 % — HIGH (ref 11.5–14.5)
RBC # FLD: 20.6 % — HIGH (ref 11.5–14.5)
RBC # FLD: 21 % — HIGH (ref 11.5–14.5)
RETICS #: 40.9 K/UL — SIGNIFICANT CHANGE UP (ref 25–125)
RETICS/RBC NFR: 2.3 % — HIGH (ref 0.5–1.5)
SODIUM SERPL-SCNC: 139 MMOL/L — SIGNIFICANT CHANGE UP (ref 135–146)
TROPONIN T SERPL-MCNC: <0.01 NG/ML — SIGNIFICANT CHANGE UP
WBC # BLD: 7.49 K/UL — SIGNIFICANT CHANGE UP (ref 4.8–10.8)
WBC # BLD: 7.67 K/UL — SIGNIFICANT CHANGE UP (ref 4.8–10.8)
WBC # BLD: 9.24 K/UL — SIGNIFICANT CHANGE UP (ref 4.8–10.8)
WBC # FLD AUTO: 7.49 K/UL — SIGNIFICANT CHANGE UP (ref 4.8–10.8)
WBC # FLD AUTO: 7.67 K/UL — SIGNIFICANT CHANGE UP (ref 4.8–10.8)
WBC # FLD AUTO: 9.24 K/UL — SIGNIFICANT CHANGE UP (ref 4.8–10.8)

## 2022-11-17 PROCEDURE — 99223 1ST HOSP IP/OBS HIGH 75: CPT

## 2022-11-17 PROCEDURE — 99221 1ST HOSP IP/OBS SF/LOW 40: CPT

## 2022-11-17 RX ORDER — GABAPENTIN 400 MG/1
100 CAPSULE ORAL THREE TIMES A DAY
Refills: 0 | Status: DISCONTINUED | OUTPATIENT
Start: 2022-11-17 | End: 2022-11-19

## 2022-11-17 RX ORDER — POTASSIUM CHLORIDE 20 MEQ
40 PACKET (EA) ORAL ONCE
Refills: 0 | Status: COMPLETED | OUTPATIENT
Start: 2022-11-17 | End: 2022-11-17

## 2022-11-17 RX ORDER — LOSARTAN POTASSIUM 100 MG/1
50 TABLET, FILM COATED ORAL DAILY
Refills: 0 | Status: DISCONTINUED | OUTPATIENT
Start: 2022-11-17 | End: 2022-11-19

## 2022-11-17 RX ORDER — ATORVASTATIN CALCIUM 80 MG/1
80 TABLET, FILM COATED ORAL AT BEDTIME
Refills: 0 | Status: DISCONTINUED | OUTPATIENT
Start: 2022-11-17 | End: 2022-11-19

## 2022-11-17 RX ORDER — LANOLIN ALCOHOL/MO/W.PET/CERES
5 CREAM (GRAM) TOPICAL AT BEDTIME
Refills: 0 | Status: DISCONTINUED | OUTPATIENT
Start: 2022-11-17 | End: 2022-11-19

## 2022-11-17 RX ORDER — OXYBUTYNIN CHLORIDE 5 MG
5 TABLET ORAL DAILY
Refills: 0 | Status: DISCONTINUED | OUTPATIENT
Start: 2022-11-17 | End: 2022-11-19

## 2022-11-17 RX ORDER — MAGNESIUM SULFATE 500 MG/ML
2 VIAL (ML) INJECTION ONCE
Refills: 0 | Status: COMPLETED | OUTPATIENT
Start: 2022-11-17 | End: 2022-11-17

## 2022-11-17 RX ORDER — FOLIC ACID 0.8 MG
1 TABLET ORAL DAILY
Refills: 0 | Status: DISCONTINUED | OUTPATIENT
Start: 2022-11-17 | End: 2022-11-19

## 2022-11-17 RX ORDER — CEFTRIAXONE 500 MG/1
1000 INJECTION, POWDER, FOR SOLUTION INTRAMUSCULAR; INTRAVENOUS EVERY 24 HOURS
Refills: 0 | Status: DISCONTINUED | OUTPATIENT
Start: 2022-11-17 | End: 2022-11-17

## 2022-11-17 RX ORDER — AZITHROMYCIN 500 MG/1
500 TABLET, FILM COATED ORAL EVERY 24 HOURS
Refills: 0 | Status: DISCONTINUED | OUTPATIENT
Start: 2022-11-17 | End: 2022-11-17

## 2022-11-17 RX ORDER — POLYETHYLENE GLYCOL 3350 17 G/17G
17 POWDER, FOR SOLUTION ORAL DAILY
Refills: 0 | Status: DISCONTINUED | OUTPATIENT
Start: 2022-11-17 | End: 2022-11-19

## 2022-11-17 RX ORDER — KETOROLAC TROMETHAMINE 30 MG/ML
15 SYRINGE (ML) INJECTION ONCE
Refills: 0 | Status: DISCONTINUED | OUTPATIENT
Start: 2022-11-17 | End: 2022-11-17

## 2022-11-17 RX ORDER — SENNA PLUS 8.6 MG/1
2 TABLET ORAL AT BEDTIME
Refills: 0 | Status: DISCONTINUED | OUTPATIENT
Start: 2022-11-17 | End: 2022-11-19

## 2022-11-17 RX ORDER — ENOXAPARIN SODIUM 100 MG/ML
40 INJECTION SUBCUTANEOUS EVERY 24 HOURS
Refills: 0 | Status: DISCONTINUED | OUTPATIENT
Start: 2022-11-17 | End: 2022-11-17

## 2022-11-17 RX ORDER — ASPIRIN/CALCIUM CARB/MAGNESIUM 324 MG
81 TABLET ORAL DAILY
Refills: 0 | Status: DISCONTINUED | OUTPATIENT
Start: 2022-11-17 | End: 2022-11-19

## 2022-11-17 RX ORDER — ASCORBIC ACID 60 MG
500 TABLET,CHEWABLE ORAL DAILY
Refills: 0 | Status: DISCONTINUED | OUTPATIENT
Start: 2022-11-17 | End: 2022-11-19

## 2022-11-17 RX ORDER — DORZOLAMIDE HYDROCHLORIDE 20 MG/ML
1 SOLUTION/ DROPS OPHTHALMIC EVERY 8 HOURS
Refills: 0 | Status: DISCONTINUED | OUTPATIENT
Start: 2022-11-17 | End: 2022-11-19

## 2022-11-17 RX ORDER — CALCIUM CARBONATE 500(1250)
1 TABLET ORAL DAILY
Refills: 0 | Status: DISCONTINUED | OUTPATIENT
Start: 2022-11-17 | End: 2022-11-19

## 2022-11-17 RX ADMIN — DORZOLAMIDE HYDROCHLORIDE 1 DROP(S): 20 SOLUTION/ DROPS OPHTHALMIC at 13:01

## 2022-11-17 RX ADMIN — POLYETHYLENE GLYCOL 3350 17 GRAM(S): 17 POWDER, FOR SOLUTION ORAL at 11:40

## 2022-11-17 RX ADMIN — Medication 15 MILLIGRAM(S): at 00:32

## 2022-11-17 RX ADMIN — DORZOLAMIDE HYDROCHLORIDE 1 DROP(S): 20 SOLUTION/ DROPS OPHTHALMIC at 21:33

## 2022-11-17 RX ADMIN — LOSARTAN POTASSIUM 50 MILLIGRAM(S): 100 TABLET, FILM COATED ORAL at 05:26

## 2022-11-17 RX ADMIN — DORZOLAMIDE HYDROCHLORIDE 1 DROP(S): 20 SOLUTION/ DROPS OPHTHALMIC at 05:26

## 2022-11-17 RX ADMIN — Medication 1 DROP(S): at 17:59

## 2022-11-17 RX ADMIN — GABAPENTIN 100 MILLIGRAM(S): 400 CAPSULE ORAL at 13:00

## 2022-11-17 RX ADMIN — SENNA PLUS 2 TABLET(S): 8.6 TABLET ORAL at 21:33

## 2022-11-17 RX ADMIN — GABAPENTIN 100 MILLIGRAM(S): 400 CAPSULE ORAL at 21:33

## 2022-11-17 RX ADMIN — Medication 1 DROP(S): at 11:42

## 2022-11-17 RX ADMIN — Medication 500 MILLIGRAM(S): at 11:41

## 2022-11-17 RX ADMIN — AZITHROMYCIN 255 MILLIGRAM(S): 500 TABLET, FILM COATED ORAL at 00:09

## 2022-11-17 RX ADMIN — Medication 5 MILLIGRAM(S): at 21:33

## 2022-11-17 RX ADMIN — Medication 1 MILLIGRAM(S): at 11:38

## 2022-11-17 RX ADMIN — ATORVASTATIN CALCIUM 80 MILLIGRAM(S): 80 TABLET, FILM COATED ORAL at 21:33

## 2022-11-17 RX ADMIN — Medication 1 DROP(S): at 05:27

## 2022-11-17 RX ADMIN — Medication 25 GRAM(S): at 11:37

## 2022-11-17 RX ADMIN — Medication 40 MILLIEQUIVALENT(S): at 11:38

## 2022-11-17 RX ADMIN — Medication 81 MILLIGRAM(S): at 11:38

## 2022-11-17 RX ADMIN — GABAPENTIN 100 MILLIGRAM(S): 400 CAPSULE ORAL at 05:26

## 2022-11-17 RX ADMIN — CEFTRIAXONE 100 MILLIGRAM(S): 500 INJECTION, POWDER, FOR SOLUTION INTRAMUSCULAR; INTRAVENOUS at 05:25

## 2022-11-17 RX ADMIN — Medication 1 TABLET(S): at 12:49

## 2022-11-17 RX ADMIN — AZITHROMYCIN 255 MILLIGRAM(S): 500 TABLET, FILM COATED ORAL at 05:25

## 2022-11-17 RX ADMIN — CEFTRIAXONE 100 MILLIGRAM(S): 500 INJECTION, POWDER, FOR SOLUTION INTRAMUSCULAR; INTRAVENOUS at 00:09

## 2022-11-17 RX ADMIN — Medication 5 MILLIGRAM(S): at 11:39

## 2022-11-17 RX ADMIN — ENOXAPARIN SODIUM 40 MILLIGRAM(S): 100 INJECTION SUBCUTANEOUS at 11:38

## 2022-11-17 NOTE — H&P ADULT - NSHPLABSRESULTS_GEN_ALL_CORE
7.5    9.93  )-----------( 97       ( 16 Nov 2022 19:47 )             23.0       11-16    136  |  104  |  20  ----------------------------<  140<H>  4.1   |  22  |  0.6<L>    Ca    8.1<L>      16 Nov 2022 19:47    TPro  7.4  /  Alb  3.6  /  TBili  1.3<H>  /  DBili  x   /  AST  17  /  ALT  14  /  AlkPhos  100  11-16

## 2022-11-17 NOTE — H&P ADULT - ATTENDING COMMENTS
a/p  # anemia, chronic ,   pt is sent in for low hgb, in the hospital had hgb 7.5 and did not require transfusion.   repeat labs are not suggestive of any infection   will need hematology follow up  b12 and folate levels in Sept wnl   Vitamin B12, Serum: 1689 pg/mL (09.05.22 @ 07:13)  Folate, Serum: >20.0 ng/mL (09.05.22 @ 07:13)  start MVI   out pt follow up     # rib pain, resolved, high lactic acid on admission resolved, no need for further antibiotics , no fever, no leukocytosis ,     CARDIAC MARKERS ( 17 Nov 2022 08:11 )  x     / <0.01 ng/mL / x     / x     / x      CARDIAC MARKERS ( 16 Nov 2022 20:59 )  x     / <0.01 ng/mL / x     / x     / x          Blood Gas Venous - Lactate: 1.60 mmol/L (11.16.22 @ 23:15)             < from: Xray Chest 1 View- PORTABLE-Urgent (11.01.22 @ 20:36) >  Low lung volumes leads to magnification of the pulmonary interstitium.  < end of copied text >                 7.5    9.93  )-----------( 97       ( 16 Nov 2022 19:47 )             23.0     PAST MEDICAL & SURGICAL HISTORY: all stable , cont home meds   Chronic anemia  HTN (hypertension)  HLD (hyperlipidemia)  urge incontinence     #Progress Note Handoff  Pending (specify): discharge  Family discussion: dw pt , she is fully aax4, prefers to return to NH in the afternoon after lunch. also spoke with nephew Zachary and updated him   Disposition: SNF

## 2022-11-17 NOTE — CONSULT NOTE ADULT - SUBJECTIVE AND OBJECTIVE BOX
Patient is a 89y old  Female who presents with a chief complaint of     HPI:  89 year old F with hx of chronic anemia on procrit and transfusions prn, htn, hld sent in from [facility] for anemia found on routine outpatient hgb 5.7. Hgb drawn here found to be 7.5, no transfusion given. Pt currently without any complaints but earlier in the ED pt had complained of left sided "rib pain," which she said has since resolved, cannot fully clarify syptoms more, jist that it hurt.   Otherwise pt has no other complaints including no respiratory symptoms, no cough, fevers, chills, sob.     In the ED, pt given rocephin and azitho, fluids, toradol, tylenol  Vitals: Vital Signs Last 24 Hrs  T(C): 36.1 (17 Nov 2022 00:30), Max: 36.4 (16 Nov 2022 18:33)  T(F): 97 (17 Nov 2022 00:30), Max: 97.5 (16 Nov 2022 18:33)  HR: 67 (17 Nov 2022 00:30) (67 - 70)  BP: 123/68 (17 Nov 2022 00:30) (123/68 - 134/90)  BP(mean): --  RR: 18 (17 Nov 2022 00:30) (18 - 18)  SpO2: 96% (17 Nov 2022 00:30) (96% - 97%)    Parameters below as of 17 Nov 2022 00:30  Patient On (Oxygen Delivery Method): room air    Labs: remarkable for lactate 3.2  Imaing:   < from: CT Angio Chest PE Protocol w/ IV Cont (11.16.22 @ 23:07) >  IMPRESSION:    No CTA evidence of acute pulmonary embolus.    Within limitations of exam, areas of likely consolidations within the   left upper and right lower lobes with multiple prominent lymph nodes.   Finding can be seen with early pneumonia.    < end of copied text >   (17 Nov 2022 01:19)       ROS:  Negative except for:    PAST MEDICAL & SURGICAL HISTORY:  Chronic anemia      HTN (hypertension)      HLD (hyperlipidemia)          SOCIAL HISTORY:    FAMILY HISTORY:      MEDICATIONS  (STANDING):  artificial  tears Solution 1 Drop(s) Both EYES four times a day  ascorbic acid 500 milliGRAM(s) Oral daily  aspirin  chewable 81 milliGRAM(s) Oral daily  atorvastatin 80 milliGRAM(s) Oral at bedtime  calcium carbonate    500 mG (Tums) Chewable 1 Tablet(s) Chew daily  dorzolamide 2% Ophthalmic Solution 1 Drop(s) Right EYE every 8 hours  enoxaparin Injectable 40 milliGRAM(s) SubCutaneous every 24 hours  folic acid 1 milliGRAM(s) Oral daily  gabapentin 100 milliGRAM(s) Oral three times a day  losartan 50 milliGRAM(s) Oral daily  magnesium sulfate  IVPB 2 Gram(s) IV Intermittent once  melatonin 5 milliGRAM(s) Oral at bedtime  oxybutynin 5 milliGRAM(s) Oral daily  polyethylene glycol 3350 17 Gram(s) Oral daily  potassium chloride   Powder 40 milliEquivalent(s) Oral once  senna 2 Tablet(s) Oral at bedtime    MEDICATIONS  (PRN):    Height (cm): 154.9 (11-16-22 @ 18:33)  Weight (kg): 54.3 (11-17-22 @ 02:48)  BMI (kg/m2): 22.6 (11-17-22 @ 02:48)  BSA (m2): 1.52 (11-17-22 @ 02:48)  Allergies    No Known Allergies    Intolerances        Vital Signs Last 24 Hrs  T(C): 36.3 (17 Nov 2022 05:00), Max: 36.4 (16 Nov 2022 18:33)  T(F): 97.3 (17 Nov 2022 05:00), Max: 97.5 (16 Nov 2022 18:33)  HR: 91 (17 Nov 2022 05:00) (67 - 91)  BP: 129/61 (17 Nov 2022 05:00) (123/68 - 134/90)  BP(mean): --  RR: 16 (17 Nov 2022 05:00) (16 - 18)  SpO2: 98% (17 Nov 2022 02:48) (96% - 98%)    Parameters below as of 17 Nov 2022 00:30  Patient On (Oxygen Delivery Method): room air        PHYSICAL EXAM  General: adult in NAD  HEENT: clear oropharynx, anicteric sclera, pink conjunctiva; mIld hearing impairment noted. using hearing assist device.  Neck: supple  CV: normal S1/S2 with no murmur rubs or gallops  Lungs: positive air movement b/l ant lungs,clear to auscultation, no wheezes, no rales  Abdomen: soft non-tender non-distended, no hepatosplenomegaly  Ext: no clubbing cyanosis or edema  Skin: no rashes and no petechiae  Neuro: alert and oriented X 4, no focal deficits      LABS:                          5.5    7.67  )-----------( 64       ( 17 Nov 2022 08:11 )             16.9         Mean Cell Volume : 97.1 fL  Mean Cell Hemoglobin : 31.6 pg  Mean Cell Hemoglobin Concentration : 32.5 g/dL  Auto Neutrophil # : 3.40 K/uL  Auto Lymphocyte # : 2.76 K/uL  Auto Monocyte # : 1.27 K/uL  Auto Eosinophil # : 0.15 K/uL  Auto Basophil # : 0.02 K/uL  Auto Neutrophil % : 44.2 %  Auto Lymphocyte % : 36.0 %  Auto Monocyte % : 16.6 %  Auto Eosinophil % : 2.0 %  Auto Basophil % : 0.3 %      Serial CBC's  11-17 @ 08:11  Hct-16.9 / Hgb-5.5 / Plat-64 / RBC-1.74 / WBC-7.67  Serial CBC's  11-16 @ 19:47  Hct-23.0 / Hgb-7.5 / Plat-97 / RBC-2.38 / WBC-9.93      11-17    139  |  106  |  19  ----------------------------<  94  3.4<L>   |  24  |  0.5<L>    Ca    7.5<L>      17 Nov 2022 08:11  Mg     1.7     11-17    TPro  5.8<L>  /  Alb  2.7<L>  /  TBili  0.6  /  DBili  x   /  AST  14  /  ALT  12  /  AlkPhos  83  11-17            RADIOLOGY & ADDITIONAL STUDIES:    < from: CT Angio Chest PE Protocol w/ IV Cont (11.16.22 @ 23:07) >  IMPRESSION:    No CTA evidence of acute pulmonary embolus.    Within limitations of exam, areas of likely consolidations within the   left upper and right lower lobes with multiple prominent lymph nodes.   Finding can be seen with early pneumonia.    < end of copied text >

## 2022-11-17 NOTE — PROGRESS NOTE ADULT - ASSESSMENT
89 year old F with hx of chronic anemia on procrit and transfusions prn, htn, hld sent in from [facility] for anemia found on routine outpatient Hb 5.7. Hb drawn here found to be 7.5.    #Acute on Chronic anemia  -Baseline Hb ~ 7-8  -At homr  -Unknown cause  -this was reason sent in, routin eoutpt labs found to be 5.7 but here 7.5  -keep active T+S, transfuse <7    #Suspect CAP  -CT Chest with findings of early pna   -Clinically no pna (no sob, on RA, no cough, fevers) or SIRS criteria, however, given lactic acidosis and findings on CT chest will treat as pna   -Rocephin and azithro  -Will send procal, if negative can likely d/c antibiotics     #L rib pain?  -had complained earlier in ED, got toradol and now without pain  -does not appear like chest pain, ecg sinus with PSC's, trop negative  -one more trop in am   -f/u Rib xray (done by ED)    #HTN  -losartan 50    #HLD  -c/w lipitor  -c/w aspirin    #Constipation  -senna, miralax, lactulose    #Glaucoma  -brinzolamide 1% to R eye q8    #Urge incontinence?  -c/w oxybutinin 5    #Neuropathy  -gabapentin 100 tid    DVT ppx: lovenox  Diet: dash  Dispo: from [facility]    89 year old F with hx of chronic anemia on procrit and transfusions prn, htn, hld sent in from [facility] for anemia found on routine outpatient Hb 5.7. Hb drawn here found to be 7.5.    #Acute on Chronic anemia  -Baseline Hb ~ 7-8  -@ NH, on Procrit 40,000 Units every Tues/Fri as well as IV venofer 200 mg q1 week x 5 weeks & pRBC transfusion prn   -Unknown cause; flow cytometry -ve   -Hb 7.5 on admission, Hb 5.5 this AM --> repeat Hb 6.1  -No signs of active bleeding  -Giving 1 unit pRBCs --> check post-transfusion CBC @ 8PM  -Follows w/ hematologist Dr. Ayush Hilario @ NH  -Heme/Onc recs appreciated  -Maintain Hb >7  -Active T&S    #?CAP  -CT Chest: findings concerning for early pna   -SIRS -ve on admission; denies cough, SOB, productive sputum -> does not appear to have pna  -Pro-danii -ve  -Lactate elevated, now WNL  -D/c'd IV abx    #L rib pain, resolved  -S/p Toradol in ED  -L Rib XR -ve    #HTN  -C/w home losartan 50 mg     #HLD  -C/w home lipitor 80 mg qhs  -C/w home aspirin    #Constipation  -C/w home senna  -C/w home miralax  -C/w home lactulose    #Glaucoma  -C/w brinzolamide 1% to R eye q8    #Urge incontinence?  -C/w oxybutynin 5 mg    #Neuropathy  -C/w home gabapentin 100 TID    DVT ppx: Holding Lovenox given drop in Hb  Diet: DASH/TLC  Full Code  Dispo: [facility], tentative 11/18/22 if Hb remains stable post-transfusion

## 2022-11-17 NOTE — H&P ADULT - ASSESSMENT
89 year old F with hx of chronic anemia on procrit and transfusions prn, htn, hld sent in from [facility] for anemia found on routine outpatient hgb 5.7. Hgb drawn here found to be 7.5.    #Suspect CAP  -CT Chest with findings of early pna   -Clinically no pna (no sob, on RA, no cough, fevers) or SIRS criteria, however, given lactic acidosis and findings on CT chest will treat as pna   -Rocephin and azithro  -Will send procal, if negative can likely d/c antibiotics     #Chronic anemia  -gets procrit, venofer and prn transfusions  -this was reason sent in, routin eoutpt labs found to be 5.7 but here 7.5  -keep active T+S, transfuse <7    #HTN  -losartan 50    #HLD  -c/w lipitor  -c/w aspirin    #Constipation  -senna, miralax, lactulose    #Glaucoma  -brinzolamide 1% to R eye q8    #Urge incontinence?  -c/w oxybutinin 5    #Neuropathy  -gabapentin 100 tid    DVT ppx: lovenox  Diet: dash  Dispo: from [facility]    89 year old F with hx of chronic anemia on procrit and transfusions prn, htn, hld sent in from [facility] for anemia found on routine outpatient hgb 5.7. Hgb drawn here found to be 7.5.    #Suspect CAP  -CT Chest with findings of early pna   -Clinically no pna (no sob, on RA, no cough, fevers) or SIRS criteria, however, given lactic acidosis and findings on CT chest will treat as pna   -Rocephin and azithro  -Will send procal, if negative can likely d/c antibiotics     #Chronic anemia  -gets procrit, venofer and prn transfusions  -this was reason sent in, routin eoutpt labs found to be 5.7 but here 7.5  -keep active T+S, transfuse <7    #L rib pain?  -had complained earlier in ED, got toradol and now without pain  -does not appear like chest pain, ecg sinus with PSC's, trop negative  -one more trop in am   -f/u Rib xray (done by ED)    #HTN  -losartan 50    #HLD  -c/w lipitor  -c/w aspirin    #Constipation  -senna, miralax, lactulose    #Glaucoma  -brinzolamide 1% to R eye q8    #Urge incontinence?  -c/w oxybutinin 5    #Neuropathy  -gabapentin 100 tid    DVT ppx: lovenox  Diet: dash  Dispo: from [facility]    89 year old F with hx of chronic anemia on procrit and transfusions prn, htn, hld sent in from [facility] for anemia found on routine outpatient hgb 5.7. Hgb drawn here found to be 7.5.    #Chronic anemia  -gets procrit, venofer and prn transfusions  -this was reason sent in, routin eoutpt labs found to be 5.7 but here 7.5  -keep active T+S, transfuse <7    #Suspect CAP  -CT Chest with findings of early pna   -Clinically no pna (no sob, on RA, no cough, fevers) or SIRS criteria, however, given lactic acidosis and findings on CT chest will treat as pna   -Rocephin and azithro  -Will send procal, if negative can likely d/c antibiotics     #L rib pain?  -had complained earlier in ED, got toradol and now without pain  -does not appear like chest pain, ecg sinus with PSC's, trop negative  -one more trop in am   -f/u Rib xray (done by ED)    #HTN  -losartan 50    #HLD  -c/w lipitor  -c/w aspirin    #Constipation  -senna, miralax, lactulose    #Glaucoma  -brinzolamide 1% to R eye q8    #Urge incontinence?  -c/w oxybutinin 5    #Neuropathy  -gabapentin 100 tid    DVT ppx: lovenox  Diet: dash  Dispo: from [facility]

## 2022-11-17 NOTE — PROGRESS NOTE ADULT - SUBJECTIVE AND OBJECTIVE BOX
CHIRAG DENISE 89y Female  MRN#: [id number]      Pt is currently admitted with the primary diagnosis of      Hospital Day:   HPI:H&P Adult [DIOGENES Wisdom] (11-17-22 @ 01:19)  H&P Adult [YOLANDE Palomares] (09-04-22 @ 22:41)      Overnight events:    Subjective complaints:                                            ----------------------------------------------------------    PAST MEDICAL & SURGICAL HISTORY  Chronic anemia    HTN (hypertension)    HLD (hyperlipidemia)                                              -----------------------------------------------------------  ALLERGIES:  No Known Allergies                                            ------------------------------------------------------------    HOME MEDICATIONS  Home Medications:  acetaminophen 325 mg oral tablet: 2 tab(s) orally every 4 hours, As Needed (09 Oct 2022 01:50)  aspirin 81 mg oral tablet, chewable: 1 tab(s) orally once a day (09 Oct 2022 01:50)  brinzolamide 1% ophthalmic suspension: 1 drop(s) to each affected eye 3 times a day (09 Oct 2022 01:50)  calcium carbonate 500 mg (200 mg elemental calcium) oral tablet, chewable: 1 tab(s) orally once a day (09 Oct 2022 01:50)  ferrous sulfate 220 mg/5 mL (44 mg/5 mL elemental iron) oral elixir: 5 milliliter(s) orally once a day (09 Oct 2022 01:50)  folic acid 1 mg oral tablet: 1 tab(s) orally once a day (09 Oct 2022 01:50)  gabapentin 100 mg oral capsule: 1 cap(s) orally 3 times a day (09 Oct 2022 01:50)  glucosamine hydrochloride 1500 mg oral tablet: 1 tab(s) orally once a day (09 Oct 2022 01:50)  lactulose 10 g/15 mL oral syrup: 15 milliliter(s) orally once a day (09 Oct 2022 01:50)  Lipitor 80 mg oral tablet: 1 tab(s) orally once a day (09 Oct 2022 01:50)  losartan 50 mg oral tablet: 1 tab(s) orally once a day (09 Oct 2022 01:50)  melatonin 5 mg oral tablet: 1 tab(s) orally once a day (at bedtime) (09 Oct 2022 01:50)  MiraLax oral powder for reconstitution: 17 gram(s) orally once a day (09 Oct 2022 01:50)  oxybutynin 5 mg/24 hours oral tablet, extended release: 1 tab(s) orally once a day (09 Oct 2022 01:50)  Procrit 40,000 units/mL preservative-free injectable solution: 1 milliliter(s) injectable 2 times a week  Tuesday and Friday. (09 Oct 2022 01:50)  Refresh ophthalmic solution: 1 drop(s) to each affected eye 4 times a day (09 Oct 2022 01:50)  Senna 8.6 mg oral tablet: 1 tab(s) orally once a day (at bedtime) (09 Oct 2022 01:50)  Vitamin C 100 mg oral tablet: 1 tab(s) orally once a day (09 Oct 2022 01:50)                           MEDICATIONS:  STANDING MEDICATIONS  artificial  tears Solution 1 Drop(s) Both EYES four times a day  ascorbic acid 500 milliGRAM(s) Oral daily  aspirin  chewable 81 milliGRAM(s) Oral daily  atorvastatin 80 milliGRAM(s) Oral at bedtime  calcium carbonate    500 mG (Tums) Chewable 1 Tablet(s) Chew daily  dorzolamide 2% Ophthalmic Solution 1 Drop(s) Right EYE every 8 hours  enoxaparin Injectable 40 milliGRAM(s) SubCutaneous every 24 hours  folic acid 1 milliGRAM(s) Oral daily  gabapentin 100 milliGRAM(s) Oral three times a day  losartan 50 milliGRAM(s) Oral daily  melatonin 5 milliGRAM(s) Oral at bedtime  oxybutynin 5 milliGRAM(s) Oral daily  polyethylene glycol 3350 17 Gram(s) Oral daily  senna 2 Tablet(s) Oral at bedtime    PRN MEDICATIONS                                            ------------------------------------------------------------  VITAL SIGNS: Last 24 Hours  T(C): 35.9 (17 Nov 2022 14:09), Max: 36.4 (16 Nov 2022 18:33)  T(F): 96.7 (17 Nov 2022 14:09), Max: 97.5 (16 Nov 2022 18:33)  HR: 62 (17 Nov 2022 14:09) (62 - 91)  BP: 120/53 (17 Nov 2022 14:09) (120/53 - 134/90)  BP(mean): --  RR: 18 (17 Nov 2022 14:09) (16 - 18)  SpO2: 98% (17 Nov 2022 02:48) (96% - 98%)                                             --------------------------------------------------------------  LABS:                        6.1    9.24  )-----------( 72       ( 17 Nov 2022 12:43 )             19.4     11-17    139  |  106  |  19  ----------------------------<  94  3.4<L>   |  24  |  0.5<L>    Ca    7.5<L>      17 Nov 2022 08:11  Mg     1.7     11-17    TPro  5.8<L>  /  Alb  2.7<L>  /  TBili  0.6  /  DBili  x   /  AST  14  /  ALT  12  /  AlkPhos  83  11-17          Troponin T, Serum: <0.01 ng/mL (11-17-22 @ 08:11)  Troponin T, Serum: <0.01 ng/mL (11-16-22 @ 20:59)  Lactate, Blood: 3.2 mmol/L *H* (11-16-22 @ 20:59)          CARDIAC MARKERS ( 17 Nov 2022 08:11 )  x     / <0.01 ng/mL / x     / x     / x      CARDIAC MARKERS ( 16 Nov 2022 20:59 )  x     / <0.01 ng/mL / x     / x     / x                                                    --------------------------------------------------------------    PHYSICAL EXAM:  General: well-appearing in NAD.   HEENT: NCAT  LUNGS: CTAB  HEART: RRR.   ABDOMEN: Nontender, nondistended.   EXT: Nonedematous.  NEURO: Deferred.                GI PPX:  DVT PPX:  DIET:  ACTIVITY:  CODE:  DISPO:             CHIRAG DENISE 89y Female  MRN#: [id number]      Pt is currently admitted with the primary diagnosis of acute on chronic anemia      Hospital Day:   HPI:  89 year old F with hx of chronic anemia on procrit and transfusions prn, htn, hld sent in from [facility] for anemia found on routine outpatient hgb 5.7. Hgb drawn here found to be 7.5, no transfusion given. Pt currently without any complaints but earlier in the ED pt had complained of left sided "rib pain," which she said has since resolved, cannot fully clarify syptoms more, jist that it hurt.   Otherwise pt has no other complaints including no respiratory symptoms, no cough, fevers, chills, sob.     In the ED, pt given rocephin and azitho, fluids, toradol, tylenol  Vitals: Vital Signs Last 24 Hrs  T(C): 36.1 (17 Nov 2022 00:30), Max: 36.4 (16 Nov 2022 18:33)  T(F): 97 (17 Nov 2022 00:30), Max: 97.5 (16 Nov 2022 18:33)  HR: 67 (17 Nov 2022 00:30) (67 - 70)  BP: 123/68 (17 Nov 2022 00:30) (123/68 - 134/90)  BP(mean): --  RR: 18 (17 Nov 2022 00:30) (18 - 18)  SpO2: 96% (17 Nov 2022 00:30) (96% - 97%)    Parameters below as of 17 Nov 2022 00:30  Patient On (Oxygen Delivery Method): room air    Labs: remarkable for lactate 3.2  Imaing:   < from: CT Angio Chest PE Protocol w/ IV Cont (11.16.22 @ 23:07) >  IMPRESSION:    No CTA evidence of acute pulmonary embolus.    Within limitations of exam, areas of likely consolidations within the   left upper and right lower lobes with multiple prominent lymph nodes.   Finding can be seen with early pneumonia.    < end of copied text >      Overnight events:  No acute overnight events     Subjective complaints:  Pt was seen and examined at bedside. No complaints at this time. Denies: dizziness, lightheadedness, CP, SOB                                            ----------------------------------------------------------    PAST MEDICAL & SURGICAL HISTORY  Chronic anemia    HTN (hypertension)    HLD (hyperlipidemia)                                              -----------------------------------------------------------  ALLERGIES:  No Known Allergies                                            ------------------------------------------------------------    HOME MEDICATIONS  Home Medications:  acetaminophen 325 mg oral tablet: 2 tab(s) orally every 4 hours, As Needed (09 Oct 2022 01:50)  aspirin 81 mg oral tablet, chewable: 1 tab(s) orally once a day (09 Oct 2022 01:50)  brinzolamide 1% ophthalmic suspension: 1 drop(s) to each affected eye 3 times a day (09 Oct 2022 01:50)  calcium carbonate 500 mg (200 mg elemental calcium) oral tablet, chewable: 1 tab(s) orally once a day (09 Oct 2022 01:50)  ferrous sulfate 220 mg/5 mL (44 mg/5 mL elemental iron) oral elixir: 5 milliliter(s) orally once a day (09 Oct 2022 01:50)  folic acid 1 mg oral tablet: 1 tab(s) orally once a day (09 Oct 2022 01:50)  gabapentin 100 mg oral capsule: 1 cap(s) orally 3 times a day (09 Oct 2022 01:50)  glucosamine hydrochloride 1500 mg oral tablet: 1 tab(s) orally once a day (09 Oct 2022 01:50)  lactulose 10 g/15 mL oral syrup: 15 milliliter(s) orally once a day (09 Oct 2022 01:50)  Lipitor 80 mg oral tablet: 1 tab(s) orally once a day (09 Oct 2022 01:50)  losartan 50 mg oral tablet: 1 tab(s) orally once a day (09 Oct 2022 01:50)  melatonin 5 mg oral tablet: 1 tab(s) orally once a day (at bedtime) (09 Oct 2022 01:50)  MiraLax oral powder for reconstitution: 17 gram(s) orally once a day (09 Oct 2022 01:50)  oxybutynin 5 mg/24 hours oral tablet, extended release: 1 tab(s) orally once a day (09 Oct 2022 01:50)  Procrit 40,000 units/mL preservative-free injectable solution: 1 milliliter(s) injectable 2 times a week  Tuesday and Friday. (09 Oct 2022 01:50)  Refresh ophthalmic solution: 1 drop(s) to each affected eye 4 times a day (09 Oct 2022 01:50)  Senna 8.6 mg oral tablet: 1 tab(s) orally once a day (at bedtime) (09 Oct 2022 01:50)  Vitamin C 100 mg oral tablet: 1 tab(s) orally once a day (09 Oct 2022 01:50)                           MEDICATIONS:  STANDING MEDICATIONS  artificial  tears Solution 1 Drop(s) Both EYES four times a day  ascorbic acid 500 milliGRAM(s) Oral daily  aspirin  chewable 81 milliGRAM(s) Oral daily  atorvastatin 80 milliGRAM(s) Oral at bedtime  calcium carbonate    500 mG (Tums) Chewable 1 Tablet(s) Chew daily  dorzolamide 2% Ophthalmic Solution 1 Drop(s) Right EYE every 8 hours  enoxaparin Injectable 40 milliGRAM(s) SubCutaneous every 24 hours  folic acid 1 milliGRAM(s) Oral daily  gabapentin 100 milliGRAM(s) Oral three times a day  losartan 50 milliGRAM(s) Oral daily  melatonin 5 milliGRAM(s) Oral at bedtime  oxybutynin 5 milliGRAM(s) Oral daily  polyethylene glycol 3350 17 Gram(s) Oral daily  senna 2 Tablet(s) Oral at bedtime    PRN MEDICATIONS                                            ------------------------------------------------------------  VITAL SIGNS: Last 24 Hours  T(C): 35.9 (17 Nov 2022 14:09), Max: 36.4 (16 Nov 2022 18:33)  T(F): 96.7 (17 Nov 2022 14:09), Max: 97.5 (16 Nov 2022 18:33)  HR: 62 (17 Nov 2022 14:09) (62 - 91)  BP: 120/53 (17 Nov 2022 14:09) (120/53 - 134/90)  BP(mean): --  RR: 18 (17 Nov 2022 14:09) (16 - 18)  SpO2: 98% (17 Nov 2022 02:48) (96% - 98%)                                             --------------------------------------------------------------  LABS:                        6.1    9.24  )-----------( 72       ( 17 Nov 2022 12:43 )             19.4     11-17    139  |  106  |  19  ----------------------------<  94  3.4<L>   |  24  |  0.5<L>    Ca    7.5<L>      17 Nov 2022 08:11  Mg     1.7     11-17    TPro  5.8<L>  /  Alb  2.7<L>  /  TBili  0.6  /  DBili  x   /  AST  14  /  ALT  12  /  AlkPhos  83  11-17          Troponin T, Serum: <0.01 ng/mL (11-17-22 @ 08:11)  Troponin T, Serum: <0.01 ng/mL (11-16-22 @ 20:59)  Lactate, Blood: 3.2 mmol/L *H* (11-16-22 @ 20:59)          CARDIAC MARKERS ( 17 Nov 2022 08:11 )  x     / <0.01 ng/mL / x     / x     / x      CARDIAC MARKERS ( 16 Nov 2022 20:59 )  x     / <0.01 ng/mL / x     / x     / x                                                --------------------------------------------------------------    PHYSICAL EXAM:  General: well-appearing in NAD. AAOx3  HEENT: NCAT  LUNGS: CTAB  HEART: RRR.   ABDOMEN: Soft, Nontender, nondistended.   EXT: Nonedematous.

## 2022-11-17 NOTE — PATIENT PROFILE ADULT - FALL HARM RISK - HARM RISK INTERVENTIONS

## 2022-11-17 NOTE — H&P ADULT - HISTORY OF PRESENT ILLNESS
89 year old F with hx of chronic anemia on procrit and transfusions prn, htn, hld sent in from [facility] for anemia found to have hgb 5.7. Pt also c/o L sided rib pain worse with palpation. She denies any trauma/injuries, chest pain, sob, cough, fever/chills, leg pain/swelling, nausea, vomiting, diarrhea, weakness, lightheadedness/syncope. 89 year old F with hx of chronic anemia on procrit and transfusions prn, htn, hld sent in from [facility] for anemia found on routine outpatient hgb 5.7. Hgb drawn here found to be 7.5, no transfusion given. Pt currently without any complaints but earlier in the ED pt had complained of left sided "rib pain," which she said has since resolved, cannot fully clarify syptoms more, jist that it hurt.   Otherwise pt has no other complaints including no respiratory symptoms, no cough, fevers, chills, sob.     In the ED, pt given rocephin and azitho, fluids, toradol, tylenol  Vitals: Vital Signs Last 24 Hrs  T(C): 36.1 (17 Nov 2022 00:30), Max: 36.4 (16 Nov 2022 18:33)  T(F): 97 (17 Nov 2022 00:30), Max: 97.5 (16 Nov 2022 18:33)  HR: 67 (17 Nov 2022 00:30) (67 - 70)  BP: 123/68 (17 Nov 2022 00:30) (123/68 - 134/90)  BP(mean): --  RR: 18 (17 Nov 2022 00:30) (18 - 18)  SpO2: 96% (17 Nov 2022 00:30) (96% - 97%)    Parameters below as of 17 Nov 2022 00:30  Patient On (Oxygen Delivery Method): room air    Labs: remarkable for lactate 3.2  Imaing:   < from: CT Angio Chest PE Protocol w/ IV Cont (11.16.22 @ 23:07) >  IMPRESSION:    No CTA evidence of acute pulmonary embolus.    Within limitations of exam, areas of likely consolidations within the   left upper and right lower lobes with multiple prominent lymph nodes.   Finding can be seen with early pneumonia.    < end of copied text >

## 2022-11-17 NOTE — CONSULT NOTE ADULT - ASSESSMENT
89 year old F with hx of chronic anemia on procrit and transfusions prn, htn, hld sent in from [facility] for anemia found on routine outpatient hgb 5.7. Hgb drawn here found to be 7.5, no transfusion given. However repat Hb today was 5.5.      Hematology and oncology were consulted for chronic anemia.      #Chronic anemia requiring transfusions.    -Hb on admission was 7.5  -Repeat hb was 5.5 today  -Baseline hb 7 to 8.(HIE labs).  -vitamin b12 and folate , LDH wnl in 9/2022    reticulocyte count sent, DCT sent.  -She was recently admitted in 9/22 for anemia received transfusions and was sent back to NH, and  was sent to ER  8/22 in  for anemia.  -flow cytometry 9/22: No remarkable abnormality.  -As per NH records she receives procrit 40,000 tues/Thrus/friday since 9/2022 and venofer 200 mg once a week since   5 weeks and transfusions once a month.  -following with hematologist at NH with Dr. Ayush Hilario        Plan  -Transfuse hb >7  --follow up with hematology at NH.  -C/w procrit 40,000    89 year old F with hx of chronic anemia on procrit and transfusions prn, htn, hld sent in from [facility] for anemia found on routine outpatient hgb 5.7. Hgb drawn here found to be 7.5, no transfusion given. However repat Hb today was 5.5.      Hematology and oncology were consulted for chronic anemia.      #Chronic anemia requiring transfusions.    -Hb on admission was 7.5  -Repeat hb was 5.5 today  -Baseline hb 7 to 8.(HIE labs).  -vitamin b12 and folate , LDH wnl in 9/2022   -corrected reticulocyte count 1.1 11/22  -She was recently admitted in 9/22 for anemia received transfusions and was sent back to NH, and  was sent to ER  8/22 in  for anemia.  -flow cytometry 9/22: No remarkable abnormality.  -As per NH records she receives procrit 40,000 tues/Thrus/friday since 9/2022 and venofer 200 mg once a week since   5 weeks and transfusions once a month.  -following with hematologist at NH with Dr. Ayush Hilario        Plan  -Transfuse hb >7  --follow up with hematology at NH.  -C/w procrit 40,000 three times a week.  -pt    89 year old F with hx of chronic anemia on procrit and transfusions prn, htn, hld sent in from [facility] for anemia found on routine outpatient hgb 5.7. Hgb drawn here found to be 7.5, no transfusion given. However repat Hb today was 5.5.      Hematology and oncology were consulted for chronic anemia.      #Chronic anemia requiring transfusions.    -Hb on admission was 7.5  -Repeat hb was 5.5 today  -Baseline hb 7 to 8.(HIE labs).  -vitamin b12 and folate , LDH wnl in 9/2022   -corrected reticulocyte count 1.1 11/22  -She was recently admitted in 9/22 for anemia received transfusions and was sent back to NH, and  was sent to ER  8/22 in  for anemia.  -flow cytometry 9/22: No remarkable abnormality.  -As per NH records she receives procrit 40,000 tues/Thrus/friday since 9/2022 and venofer 200 mg once a week since   5 weeks and transfusions once a month.  -following with hematologist at NH with Dr. Ayush Hilario        Plan  -Transfuse hb >7  --follow up with hematology at NH.  -C/w procrit 40,000 two times a week as she is getting in the NH.  -would n't recs bone marrow biopsy given the pt age and even if she found to have any abnormality  recs would be  supportive management.   -c/w supportive transfusions and procrit injections.

## 2022-11-18 ENCOUNTER — TRANSCRIPTION ENCOUNTER (OUTPATIENT)
Age: 87
End: 2022-11-18

## 2022-11-18 VITALS
DIASTOLIC BLOOD PRESSURE: 82 MMHG | OXYGEN SATURATION: 97 % | HEART RATE: 60 BPM | SYSTOLIC BLOOD PRESSURE: 173 MMHG | RESPIRATION RATE: 18 BRPM | TEMPERATURE: 97 F

## 2022-11-18 LAB
ANION GAP SERPL CALC-SCNC: 7 MMOL/L — SIGNIFICANT CHANGE UP (ref 7–14)
BUN SERPL-MCNC: 20 MG/DL — SIGNIFICANT CHANGE UP (ref 10–20)
CALCIUM SERPL-MCNC: 7.7 MG/DL — LOW (ref 8.4–10.5)
CHLORIDE SERPL-SCNC: 106 MMOL/L — SIGNIFICANT CHANGE UP (ref 98–110)
CO2 SERPL-SCNC: 24 MMOL/L — SIGNIFICANT CHANGE UP (ref 17–32)
CREAT SERPL-MCNC: 0.5 MG/DL — LOW (ref 0.7–1.5)
EGFR: 85 ML/MIN/1.73M2 — SIGNIFICANT CHANGE UP
GLUCOSE SERPL-MCNC: 91 MG/DL — SIGNIFICANT CHANGE UP (ref 70–99)
HCT VFR BLD CALC: 23.6 % — LOW (ref 37–47)
HGB BLD-MCNC: 7.6 G/DL — LOW (ref 12–16)
MAGNESIUM SERPL-MCNC: 2 MG/DL — SIGNIFICANT CHANGE UP (ref 1.8–2.4)
MCHC RBC-ENTMCNC: 30.5 PG — SIGNIFICANT CHANGE UP (ref 27–31)
MCHC RBC-ENTMCNC: 32.2 G/DL — SIGNIFICANT CHANGE UP (ref 32–37)
MCV RBC AUTO: 94.8 FL — SIGNIFICANT CHANGE UP (ref 81–99)
NRBC # BLD: 0 /100 WBCS — SIGNIFICANT CHANGE UP (ref 0–0)
PLATELET # BLD AUTO: 65 K/UL — LOW (ref 130–400)
POTASSIUM SERPL-MCNC: 4 MMOL/L — SIGNIFICANT CHANGE UP (ref 3.5–5)
POTASSIUM SERPL-SCNC: 4 MMOL/L — SIGNIFICANT CHANGE UP (ref 3.5–5)
RBC # BLD: 2.49 M/UL — LOW (ref 4.2–5.4)
RBC # FLD: 18.1 % — HIGH (ref 11.5–14.5)
SODIUM SERPL-SCNC: 137 MMOL/L — SIGNIFICANT CHANGE UP (ref 135–146)
WBC # BLD: 7.07 K/UL — SIGNIFICANT CHANGE UP (ref 4.8–10.8)
WBC # FLD AUTO: 7.07 K/UL — SIGNIFICANT CHANGE UP (ref 4.8–10.8)

## 2022-11-18 PROCEDURE — 99239 HOSP IP/OBS DSCHRG MGMT >30: CPT

## 2022-11-18 RX ADMIN — SENNA PLUS 2 TABLET(S): 8.6 TABLET ORAL at 21:07

## 2022-11-18 RX ADMIN — ATORVASTATIN CALCIUM 80 MILLIGRAM(S): 80 TABLET, FILM COATED ORAL at 21:06

## 2022-11-18 RX ADMIN — Medication 1 DROP(S): at 17:10

## 2022-11-18 RX ADMIN — Medication 1 TABLET(S): at 11:30

## 2022-11-18 RX ADMIN — GABAPENTIN 100 MILLIGRAM(S): 400 CAPSULE ORAL at 21:06

## 2022-11-18 RX ADMIN — Medication 1 DROP(S): at 11:31

## 2022-11-18 RX ADMIN — DORZOLAMIDE HYDROCHLORIDE 1 DROP(S): 20 SOLUTION/ DROPS OPHTHALMIC at 13:00

## 2022-11-18 RX ADMIN — GABAPENTIN 100 MILLIGRAM(S): 400 CAPSULE ORAL at 05:34

## 2022-11-18 RX ADMIN — Medication 5 MILLIGRAM(S): at 11:29

## 2022-11-18 RX ADMIN — Medication 1 MILLIGRAM(S): at 11:29

## 2022-11-18 RX ADMIN — Medication 1 DROP(S): at 05:35

## 2022-11-18 RX ADMIN — Medication 500 MILLIGRAM(S): at 11:29

## 2022-11-18 RX ADMIN — LOSARTAN POTASSIUM 50 MILLIGRAM(S): 100 TABLET, FILM COATED ORAL at 05:34

## 2022-11-18 RX ADMIN — Medication 81 MILLIGRAM(S): at 11:29

## 2022-11-18 RX ADMIN — Medication 5 MILLIGRAM(S): at 21:07

## 2022-11-18 RX ADMIN — DORZOLAMIDE HYDROCHLORIDE 1 DROP(S): 20 SOLUTION/ DROPS OPHTHALMIC at 05:34

## 2022-11-18 RX ADMIN — DORZOLAMIDE HYDROCHLORIDE 1 DROP(S): 20 SOLUTION/ DROPS OPHTHALMIC at 21:06

## 2022-11-18 NOTE — DISCHARGE NOTE PROVIDER - CARE PROVIDER_API CALL
Rickie Peacock  Phone: (   )    -  Fax: (   )    -  Follow Up Time: 1 month   Tang Vargas (DO)  Internal Medicine  [address] [place] [address]  Phone: [phone]  Fax: [phone]  Follow Up Time: 2 weeks    Rickie Peacock  Phone: (   )    -  Fax: (   )    -  Follow Up Time: 1 month

## 2022-11-18 NOTE — DISCHARGE NOTE PROVIDER - HOSPITAL COURSE
89 year old F with hx of chronic anemia on procrit and transfusions prn, htn, hld sent in from [facility] for anemia found on routine outpatient hgb 5.7. Hgb drawn here found to be 7.5, no transfusion given. Pt currently without any complaints but earlier in the ED pt had complained of left sided "rib pain," which she said has since resolved, cannot fully clarify syptoms more, jist that it hurt.   Otherwise pt has no other complaints including no respiratory symptoms, no cough, fevers, chills, sob.     In the ED, pt given rocephin and azitho, fluids, toradol, tylenol  Vitals: Vital Signs Last 24 Hrs  T(C): 36.1 (17 Nov 2022 00:30), Max: 36.4 (16 Nov 2022 18:33)  T(F): 97 (17 Nov 2022 00:30), Max: 97.5 (16 Nov 2022 18:33)  HR: 67 (17 Nov 2022 00:30) (67 - 70)  BP: 123/68 (17 Nov 2022 00:30) (123/68 - 134/90)  BP(mean): --  RR: 18 (17 Nov 2022 00:30) (18 - 18)  SpO2: 96% (17 Nov 2022 00:30) (96% - 97%)    Parameters below as of 17 Nov 2022 00:30  Patient On (Oxygen Delivery Method): room air    Labs: remarkable for lactate 3.2  Imaing:   < from: CT Angio Chest PE Protocol w/ IV Cont (11.16.22 @ 23:07) >  IMPRESSION:    No CTA evidence of acute pulmonary embolus.    Within limitations of exam, areas of likely consolidations within the   left upper and right lower lobes with multiple prominent lymph nodes.   Finding can be seen with early pneumonia.    < end of copied text >   89 y/o F w/ PMHx of chronic anemia (on procrit, venofer & transfusions prn), HTN, HLD from [facility] for anemia found on routine outpatient labs w/ Hb 5.7. Hb drawn in ED found to be 7.5, no transfusion given initially. CTA -ve for PE. Admitted to medicine for acute on chronic anemia. Hb on 11/17/22 AM 5.5 --> 6.1 on repeat. Given 1 unit of pRBCs w/ post-transfusion Hb of 7.9. Hb this AM remained stable. Initially started on azithro & ceftriaxone for concern of early pna given elevated lactate. Abx d/c'd given pt has no clinical sxs of pna & repeat lactate WNL. Also started on HCTZ 12.5 mg here for elevated SBP ~ 160-170s. Pt is medically stable for d/c to [facility]. 89 y/o F w/ PMHx of chronic anemia (on procrit, venofer & transfusions prn), HTN, HLD from [facility] for anemia found on routine outpatient labs w/ Hb 5.7. Hb drawn in ED found to be 7.5, no transfusion given initially. CTA -ve for PE. Admitted to medicine for acute on chronic anemia. Hb on 11/17/22 AM 5.5 --> 6.1 on repeat. Given 1 unit of pRBCs w/ post-transfusion Hb of 7.9. Hb this AM remained stable. Initially started on azithro & ceftriaxone for concern of early pna given elevated lactate. Abx d/c'd given pt has no clinical sxs of pna & repeat lactate WNL. Elevated SBP ~ 160-170s, advised to follow-up w/ PCP & consider adjusting BP meds if it remains elevated. Pt is medically stable for d/c to [facility].

## 2022-11-18 NOTE — DISCHARGE NOTE NURSING/CASE MANAGEMENT/SOCIAL WORK - NSDCPEFALRISK_GEN_ALL_CORE
For information on Fall & Injury Prevention, visit: [email or web address] OR  [email or web address] OR  https://www.cdc.gov/steadi/patient.html

## 2022-11-18 NOTE — DISCHARGE NOTE NURSING/CASE MANAGEMENT/SOCIAL WORK - PATIENT PORTAL LINK FT
You can access the FollowMyHealth Patient Portal offered by [facility] by registering at the following website: [email or web address]. By joining [employer]’s FollowMyHealth portal, you will also be able to view your health information using other applications (apps) compatible with our system.

## 2022-11-18 NOTE — DISCHARGE NOTE PROVIDER - NSDCMRMEDTOKEN_GEN_ALL_CORE_FT
acetaminophen 325 mg oral tablet: 2 tab(s) orally every 4 hours, As Needed  aspirin 81 mg oral tablet, chewable: 1 tab(s) orally once a day  brinzolamide 1% ophthalmic suspension: 1 drop(s) to each affected eye 3 times a day  calcium carbonate 500 mg (200 mg elemental calcium) oral tablet, chewable: 1 tab(s) orally once a day  ferrous sulfate 220 mg/5 mL (44 mg/5 mL elemental iron) oral elixir: 5 milliliter(s) orally once a day  folic acid 1 mg oral tablet: 1 tab(s) orally once a day  gabapentin 100 mg oral capsule: 1 cap(s) orally 3 times a day  glucosamine hydrochloride 1500 mg oral tablet: 1 tab(s) orally once a day  lactulose 10 g/15 mL oral syrup: 15 milliliter(s) orally once a day  Lipitor 80 mg oral tablet: 1 tab(s) orally once a day  losartan 50 mg oral tablet: 1 tab(s) orally once a day  melatonin 5 mg oral tablet: 1 tab(s) orally once a day (at bedtime)  MiraLax oral powder for reconstitution: 17 gram(s) orally once a day  oxybutynin 5 mg/24 hours oral tablet, extended release: 1 tab(s) orally once a day  Procrit 40,000 units/mL preservative-free injectable solution: 1 milliliter(s) injectable 2 times a week  Tuesday and Friday.  Refresh ophthalmic solution: 1 drop(s) to each affected eye 4 times a day  Senna 8.6 mg oral tablet: 1 tab(s) orally once a day (at bedtime)  Vitamin C 100 mg oral tablet: 1 tab(s) orally once a day   acetaminophen 325 mg oral tablet: 2 tab(s) orally every 4 hours, As Needed  aspirin 81 mg oral tablet, chewable: 1 tab(s) orally once a day  brinzolamide 1% ophthalmic suspension: 1 drop(s) to each affected eye 3 times a day  calcium carbonate 500 mg (200 mg elemental calcium) oral tablet, chewable: 1 tab(s) orally once a day  ferrous sulfate 220 mg/5 mL (44 mg/5 mL elemental iron) oral elixir: 5 milliliter(s) orally once a day  folic acid 1 mg oral tablet: 1 tab(s) orally once a day  gabapentin 100 mg oral capsule: 1 cap(s) orally 3 times a day  glucosamine hydrochloride 1500 mg oral tablet: 1 tab(s) orally once a day  hydroCHLOROthiazide 12.5 mg oral capsule: 1 cap(s) orally once a day  lactulose 10 g/15 mL oral syrup: 15 milliliter(s) orally once a day  Lipitor 80 mg oral tablet: 1 tab(s) orally once a day  losartan 50 mg oral tablet: 1 tab(s) orally once a day  melatonin 5 mg oral tablet: 1 tab(s) orally once a day (at bedtime)  MiraLax oral powder for reconstitution: 17 gram(s) orally once a day  oxybutynin 5 mg/24 hours oral tablet, extended release: 1 tab(s) orally once a day  Procrit 40,000 units/mL preservative-free injectable solution: 1 milliliter(s) injectable 2 times a week  Tuesday and Friday.  Refresh ophthalmic solution: 1 drop(s) to each affected eye 4 times a day  Senna 8.6 mg oral tablet: 1 tab(s) orally once a day (at bedtime)  Vitamin C 100 mg oral tablet: 1 tab(s) orally once a day

## 2022-11-18 NOTE — DISCHARGE NOTE PROVIDER - PROVIDER TOKENS
FREE:[LAST:[Rewais],FIRST:[Rickie],PHONE:[(   )    -],FAX:[(   )    -],FOLLOWUP:[1 month]] PROVIDER:[TOKEN:[[id number]],FOLLOWUP:[2 weeks]],FREE:[LAST:[Rewais],FIRST:[Rickie],PHONE:[(   )    -],FAX:[(   )    -],FOLLOWUP:[1 month]]

## 2022-11-18 NOTE — DISCHARGE NOTE PROVIDER - NSDCCPCAREPLAN_GEN_ALL_CORE_FT
PRINCIPAL DISCHARGE DIAGNOSIS  Diagnosis: Acute on chronic blood loss anemia  Assessment and Plan of Treatment: You were admitted to the hospital for acute on chronic anemia. You required a blood transfusion. Your hemoglobin recovered after the blood transfusion. Please make sure to schedule an appointment to follow-up w/ your hematologist as well as primary care provider.  Anemia is a condition in which the concentration of red blood cells or hemoglobin in the blood is below normal. Hemoglobin is a substance in red blood cells that carries oxygen to the tissues of the body. Anemia results in not enough oxygen reaching these tissues which can cause symptoms such as weakness, dizziness/lightheadedness, shortness of breath, chest pain, paleness, or nausea. The cause of your anemia may or may not be determined immediately. If your hemoglobin was dangerously low, you may have received a blood transfusion. Usually reactions to transfusions occur immediately but monitor yourself for any fevers, rash, or shortness of breath.  SEEK IMMEDIATE MEDICAL CARE IF YOU HAVE ANY OF THE FOLLOWING SYMPTOMS: extreme weakness/chest pain/shortness of breath, black or bloody stools, vomiting blood, fainting, fever, or any signs of dehydration.

## 2022-11-18 NOTE — PROGRESS NOTE ADULT - SUBJECTIVE AND OBJECTIVE BOX
pt seen and examined  no new complaints         ROS: no cp, no sob, no n/v, no fever    Vital Signs Last 24 Hrs  T(C): 35.9 (18 Nov 2022 05:00), Max: 35.9 (17 Nov 2022 14:09)  T(F): 96.7 (18 Nov 2022 05:00), Max: 96.7 (17 Nov 2022 14:09)  HR: 57 (18 Nov 2022 13:13) (57 - 77)  BP: 161/85 (18 Nov 2022 13:13) (120/53 - 179/73)  BP(mean): 97 (17 Nov 2022 22:26) (97 - 97)  RR: 18 (18 Nov 2022 05:00) (18 - 18)  SpO2: 100% (18 Nov 2022 05:00) (99% - 100%)    Parameters below as of 17 Nov 2022 18:35  Patient On (Oxygen Delivery Method): room air        physical exam  constitutional NAD, AAOX3, Respiratory  lungs CTA, CVS heart RRR, GI: abdomen Soft NT, ND, BS+, skin: intact  neuro exam Motor, sensory and CN normal, no deficit     MEDICATIONS  (STANDING):  artificial  tears Solution 1 Drop(s) Both EYES four times a day  ascorbic acid 500 milliGRAM(s) Oral daily  aspirin  chewable 81 milliGRAM(s) Oral daily  atorvastatin 80 milliGRAM(s) Oral at bedtime  calcium carbonate    500 mG (Tums) Chewable 1 Tablet(s) Chew daily  dorzolamide 2% Ophthalmic Solution 1 Drop(s) Right EYE every 8 hours  folic acid 1 milliGRAM(s) Oral daily  gabapentin 100 milliGRAM(s) Oral three times a day  hydrochlorothiazide 12.5 milliGRAM(s) Oral daily  losartan 50 milliGRAM(s) Oral daily  melatonin 5 milliGRAM(s) Oral at bedtime  oxybutynin 5 milliGRAM(s) Oral daily  polyethylene glycol 3350 17 Gram(s) Oral daily  senna 2 Tablet(s) Oral at bedtime    MEDICATIONS  (PRN):    CAPILLARY BLOOD GLUCOSE                              7.6    7.07  )-----------( 65       ( 18 Nov 2022 06:11 )             23.6     11-18    137  |  106  |  20  ----------------------------<  91  4.0   |  24  |  0.5<L>    Ca    7.7<L>      18 Nov 2022 06:11  Mg     2.0     11-18    TPro  5.8<L>  /  Alb  2.7<L>  /  TBili  0.6  /  DBili  x   /  AST  14  /  ALT  12  /  AlkPhos  83  11-17    Procalcitonin, Serum: 0.24 ng/mL [0.02 - 0.10] (11-17-22 @ 08:11)    COVID-19 PCR: NotDetec (11-16-22 @ 19:47)      CARDIAC MARKERS ( 17 Nov 2022 08:11 )  x     / <0.01 ng/mL / x     / x     / x      CARDIAC MARKERS ( 16 Nov 2022 20:59 )  x     / <0.01 ng/mL / x     / x     / x        Iron Total, Serum: 55 ug/dL (09.05.22 @ 07:13)    a/p  # anemia poss MDS , sp transfusion   workup to be continued as outpt   pt not a candidate for bone marrow biopsy  cont epogen,   transfusions when hgb<7  fu hematology/oncology as outpt    # HTN (hypertension) cont meds   # HLD (hyperlipidemia) cont meds    #Progress Note Handoff  Pending (specify):  discharge   Family discussion: gilberto pt   Disposition: SNF                pt seen and examined  no new complaints         ROS: no cp, no sob, no n/v, no fever    Vital Signs Last 24 Hrs  T(C): 35.9 (18 Nov 2022 05:00), Max: 35.9 (17 Nov 2022 14:09)  T(F): 96.7 (18 Nov 2022 05:00), Max: 96.7 (17 Nov 2022 14:09)  HR: 57 (18 Nov 2022 13:13) (57 - 77)  BP: 161/85 (18 Nov 2022 13:13) (120/53 - 179/73)  BP(mean): 97 (17 Nov 2022 22:26) (97 - 97)  RR: 18 (18 Nov 2022 05:00) (18 - 18)  SpO2: 100% (18 Nov 2022 05:00) (99% - 100%)    Parameters below as of 17 Nov 2022 18:35  Patient On (Oxygen Delivery Method): room air        physical exam  constitutional NAD, AAOX3, Respiratory  lungs CTA, CVS heart RRR, GI: abdomen Soft NT, ND, BS+, skin: intact  neuro exam Motor, sensory and CN normal, no deficit     MEDICATIONS  (STANDING):  artificial  tears Solution 1 Drop(s) Both EYES four times a day  ascorbic acid 500 milliGRAM(s) Oral daily  aspirin  chewable 81 milliGRAM(s) Oral daily  atorvastatin 80 milliGRAM(s) Oral at bedtime  calcium carbonate    500 mG (Tums) Chewable 1 Tablet(s) Chew daily  dorzolamide 2% Ophthalmic Solution 1 Drop(s) Right EYE every 8 hours  folic acid 1 milliGRAM(s) Oral daily  gabapentin 100 milliGRAM(s) Oral three times a day  hydrochlorothiazide 12.5 milliGRAM(s) Oral daily  losartan 50 milliGRAM(s) Oral daily  melatonin 5 milliGRAM(s) Oral at bedtime  oxybutynin 5 milliGRAM(s) Oral daily  polyethylene glycol 3350 17 Gram(s) Oral daily  senna 2 Tablet(s) Oral at bedtime    MEDICATIONS  (PRN):    CAPILLARY BLOOD GLUCOSE                              7.6    7.07  )-----------( 65       ( 18 Nov 2022 06:11 )             23.6     11-18    137  |  106  |  20  ----------------------------<  91  4.0   |  24  |  0.5<L>    Ca    7.7<L>      18 Nov 2022 06:11  Mg     2.0     11-18    TPro  5.8<L>  /  Alb  2.7<L>  /  TBili  0.6  /  DBili  x   /  AST  14  /  ALT  12  /  AlkPhos  83  11-17    Procalcitonin, Serum: 0.24 ng/mL [0.02 - 0.10] (11-17-22 @ 08:11)    COVID-19 PCR: NotDetec (11-16-22 @ 19:47)      CARDIAC MARKERS ( 17 Nov 2022 08:11 )  x     / <0.01 ng/mL / x     / x     / x      CARDIAC MARKERS ( 16 Nov 2022 20:59 )  x     / <0.01 ng/mL / x     / x     / x        Iron Total, Serum: 55 ug/dL (09.05.22 @ 07:13)    a/p  # anemia poss MDS , sp transfusion   workup to be continued as outpt   pt not a candidate for bone marrow biopsy  cont epogen,   transfusions when hgb<7  fu hematology/oncology as outpt    # HTN (hypertension) cont meds   # HLD (hyperlipidemia) cont meds    #Progress Note Handoff  Pending (specify):  discharge   Family discussion: gilberto pt   Disposition: SNF   time spent 35 min

## 2022-11-24 DIAGNOSIS — G62.9 POLYNEUROPATHY, UNSPECIFIED: ICD-10-CM

## 2022-11-24 DIAGNOSIS — H40.9 UNSPECIFIED GLAUCOMA: ICD-10-CM

## 2022-11-24 DIAGNOSIS — Z79.82 LONG TERM (CURRENT) USE OF ASPIRIN: ICD-10-CM

## 2022-11-24 DIAGNOSIS — N39.41 URGE INCONTINENCE: ICD-10-CM

## 2022-11-24 DIAGNOSIS — I10 ESSENTIAL (PRIMARY) HYPERTENSION: ICD-10-CM

## 2022-11-24 DIAGNOSIS — D62 ACUTE POSTHEMORRHAGIC ANEMIA: ICD-10-CM

## 2022-11-24 DIAGNOSIS — K59.00 CONSTIPATION, UNSPECIFIED: ICD-10-CM

## 2022-11-24 DIAGNOSIS — E87.20 ACIDOSIS, UNSPECIFIED: ICD-10-CM

## 2022-11-24 DIAGNOSIS — Z20.822 CONTACT WITH AND (SUSPECTED) EXPOSURE TO COVID-19: ICD-10-CM

## 2022-11-24 DIAGNOSIS — J18.9 PNEUMONIA, UNSPECIFIED ORGANISM: ICD-10-CM

## 2022-11-24 DIAGNOSIS — E78.5 HYPERLIPIDEMIA, UNSPECIFIED: ICD-10-CM

## 2022-12-06 ENCOUNTER — EMERGENCY (EMERGENCY)
Facility: HOSPITAL | Age: 87
LOS: 0 days | Discharge: HOME | End: 2022-12-07
Attending: EMERGENCY MEDICINE | Admitting: EMERGENCY MEDICINE

## 2022-12-06 VITALS
HEART RATE: 58 BPM | OXYGEN SATURATION: 98 % | DIASTOLIC BLOOD PRESSURE: 63 MMHG | RESPIRATION RATE: 20 BRPM | HEIGHT: 61 IN | TEMPERATURE: 98 F | SYSTOLIC BLOOD PRESSURE: 135 MMHG

## 2022-12-06 DIAGNOSIS — E78.5 HYPERLIPIDEMIA, UNSPECIFIED: ICD-10-CM

## 2022-12-06 DIAGNOSIS — D64.9 ANEMIA, UNSPECIFIED: ICD-10-CM

## 2022-12-06 DIAGNOSIS — I10 ESSENTIAL (PRIMARY) HYPERTENSION: ICD-10-CM

## 2022-12-06 DIAGNOSIS — Z79.82 LONG TERM (CURRENT) USE OF ASPIRIN: ICD-10-CM

## 2022-12-06 DIAGNOSIS — Z20.822 CONTACT WITH AND (SUSPECTED) EXPOSURE TO COVID-19: ICD-10-CM

## 2022-12-06 PROCEDURE — 99284 EMERGENCY DEPT VISIT MOD MDM: CPT

## 2022-12-06 NOTE — ED PROVIDER NOTE - CLINICAL SUMMARY MEDICAL DECISION MAKING FREE TEXT BOX
Throughout ED observation period, pt remained clinically and hemodynamically stable.  can dc back to facility and continue to f/u as OP

## 2022-12-06 NOTE — ED PROVIDER NOTE - OBJECTIVE STATEMENT
89-year-old female with past medical history of chronic anemia on Procrit/venofer/transfusions as needed, HTN, and HLD presents to the ED sent in from [facility] for low hemoglobin.  Patient had blood work showing hemoglobin of 6.2.  Patient denies any complaints at this time, states she feels well.

## 2022-12-06 NOTE — ED PROVIDER NOTE - ATTENDING APP SHARED VISIT CONTRIBUTION OF CARE
89 year old F with hx of chronic anemia on procrit and transfusions prn, htn, hld  pt presents for asymptomatic anemia.  pt had routine labs done and hgb was 6.2.  pt has no complaints.  no cp, sob, syncope, black stools, or bloody stools.    vss  gen- NAD, aaox3  card-rrr  lungs-ctab, no wheezing or rhonchi  abd-sntnd, no guarding or rebound  neuro- full str/sensation, cn ii-xii grossly intact, normal coordination    papers from [facility] reviewed  pt asymptomatic  will transfuse, dc

## 2022-12-06 NOTE — ED PROVIDER NOTE - PATIENT PORTAL LINK FT
You can access the FollowMyHealth Patient Portal offered by [facility] by registering at the following website: [email or web address]. By joining "[employer]’s FollowMyHealth portal, you will also be able to view your health information using other applications (apps) compatible with our system.

## 2022-12-06 NOTE — ED PROVIDER NOTE - PHYSICAL EXAMINATION
VITAL SIGNS: I have reviewed nursing notes and confirm.  CONSTITUTIONAL: Elderly female laying on stretcher; in no acute distress.  SKIN: Skin exam is warm and dry, no acute rash.  HEAD: Normocephalic; atraumatic.  EYES: Conjunctiva and sclera clear.  ENT: No nasal discharge; airway clear.   CARD: S1, S2 normal; no murmurs, gallops, or rubs. Regular rate and rhythm.  RESP: No wheezes, rales or rhonchi. Speaking in full sentences.   ABD: Normal bowel sounds; soft; non-distended; non-tender; No rebound or guarding. No CVA tenderness.  EXT: Normal ROM. No clubbing, cyanosis or edema.  NEURO: Alert, oriented. Grossly unremarkable. No focal deficits.

## 2022-12-06 NOTE — ED PROVIDER NOTE - CARE PROVIDER_API CALL
Shawnee Bhakta)  Hematology; Internal Medicine; Medical Oncology  [address] [place] [address]  Phone: [phone]  Fax: [phone]  Follow Up Time: Routine

## 2022-12-07 VITALS
SYSTOLIC BLOOD PRESSURE: 167 MMHG | OXYGEN SATURATION: 95 % | TEMPERATURE: 95 F | RESPIRATION RATE: 19 BRPM | DIASTOLIC BLOOD PRESSURE: 73 MMHG | HEART RATE: 62 BPM

## 2022-12-07 LAB
ALBUMIN SERPL ELPH-MCNC: 3.6 G/DL — SIGNIFICANT CHANGE UP (ref 3.5–5.2)
ALP SERPL-CCNC: 94 U/L — SIGNIFICANT CHANGE UP (ref 30–115)
ALT FLD-CCNC: 14 U/L — SIGNIFICANT CHANGE UP (ref 0–41)
ANION GAP SERPL CALC-SCNC: 7 MMOL/L — SIGNIFICANT CHANGE UP (ref 7–14)
AST SERPL-CCNC: 16 U/L — SIGNIFICANT CHANGE UP (ref 0–41)
BASOPHILS # BLD AUTO: 0.02 K/UL — SIGNIFICANT CHANGE UP (ref 0–0.2)
BASOPHILS NFR BLD AUTO: 0.3 % — SIGNIFICANT CHANGE UP (ref 0–1)
BILIRUB SERPL-MCNC: 1.1 MG/DL — SIGNIFICANT CHANGE UP (ref 0.2–1.2)
BLD GP AB SCN SERPL QL: SIGNIFICANT CHANGE UP
BUN SERPL-MCNC: 23 MG/DL — HIGH (ref 10–20)
CALCIUM SERPL-MCNC: 8.7 MG/DL — SIGNIFICANT CHANGE UP (ref 8.4–10.5)
CHLORIDE SERPL-SCNC: 107 MMOL/L — SIGNIFICANT CHANGE UP (ref 98–110)
CO2 SERPL-SCNC: 26 MMOL/L — SIGNIFICANT CHANGE UP (ref 17–32)
CREAT SERPL-MCNC: 0.5 MG/DL — LOW (ref 0.7–1.5)
EGFR: 85 ML/MIN/1.73M2 — SIGNIFICANT CHANGE UP
EOSINOPHIL # BLD AUTO: 0.19 K/UL — SIGNIFICANT CHANGE UP (ref 0–0.7)
EOSINOPHIL NFR BLD AUTO: 2.4 % — SIGNIFICANT CHANGE UP (ref 0–8)
GLUCOSE SERPL-MCNC: 87 MG/DL — SIGNIFICANT CHANGE UP (ref 70–99)
HCT VFR BLD CALC: 22.2 % — LOW (ref 37–47)
HGB BLD-MCNC: 7.1 G/DL — LOW (ref 12–16)
IMM GRANULOCYTES NFR BLD AUTO: 0.6 % — HIGH (ref 0.1–0.3)
LYMPHOCYTES # BLD AUTO: 3.2 K/UL — SIGNIFICANT CHANGE UP (ref 1.2–3.4)
LYMPHOCYTES # BLD AUTO: 41 % — SIGNIFICANT CHANGE UP (ref 20.5–51.1)
MCHC RBC-ENTMCNC: 31 PG — SIGNIFICANT CHANGE UP (ref 27–31)
MCHC RBC-ENTMCNC: 32 G/DL — SIGNIFICANT CHANGE UP (ref 32–37)
MCV RBC AUTO: 96.9 FL — SIGNIFICANT CHANGE UP (ref 81–99)
MONOCYTES # BLD AUTO: 1.28 K/UL — HIGH (ref 0.1–0.6)
MONOCYTES NFR BLD AUTO: 16.4 % — HIGH (ref 1.7–9.3)
NEUTROPHILS # BLD AUTO: 3.07 K/UL — SIGNIFICANT CHANGE UP (ref 1.4–6.5)
NEUTROPHILS NFR BLD AUTO: 39.3 % — LOW (ref 42.2–75.2)
NRBC # BLD: 0 /100 WBCS — SIGNIFICANT CHANGE UP (ref 0–0)
PLATELET # BLD AUTO: 69 K/UL — LOW (ref 130–400)
POTASSIUM SERPL-MCNC: 4.1 MMOL/L — SIGNIFICANT CHANGE UP (ref 3.5–5)
POTASSIUM SERPL-SCNC: 4.1 MMOL/L — SIGNIFICANT CHANGE UP (ref 3.5–5)
PROT SERPL-MCNC: 6.9 G/DL — SIGNIFICANT CHANGE UP (ref 6–8)
RBC # BLD: 2.29 M/UL — LOW (ref 4.2–5.4)
RBC # FLD: 21 % — HIGH (ref 11.5–14.5)
SARS-COV-2 RNA SPEC QL NAA+PROBE: SIGNIFICANT CHANGE UP
SODIUM SERPL-SCNC: 140 MMOL/L — SIGNIFICANT CHANGE UP (ref 135–146)
WBC # BLD: 7.81 K/UL — SIGNIFICANT CHANGE UP (ref 4.8–10.8)
WBC # FLD AUTO: 7.81 K/UL — SIGNIFICANT CHANGE UP (ref 4.8–10.8)

## 2022-12-07 NOTE — ED ADULT NURSE NOTE - NSIMPLEMENTINTERV_GEN_ALL_ED
[General Appearance - In No Acute Distress] : no acute distress [Neck Cervical Mass (___cm)] : no neck mass was observed [Heart Sounds] : normal S1 and S2 [Auscultation Breath Sounds / Voice Sounds] : lungs were clear to auscultation bilaterally [Abnormal Walk] : normal gait [Cyanosis, Localized] : no localized cyanosis [Skin Turgor] : normal skin turgor [No Focal Deficits] : no focal deficits [Oriented To Time, Place, And Person] : oriented to person, place, and time [] : no rash Implemented All Fall with Harm Risk Interventions:  [place] to call system. Call bell, personal items and telephone within reach. Instruct patient to call for assistance. Room bathroom lighting operational. Non-slip footwear when patient is off stretcher. Physically safe environment: no spills, clutter or unnecessary equipment. Stretcher in lowest position, wheels locked, appropriate side rails in place. Provide visual cue, wrist band, yellow gown, etc. Monitor gait and stability. Monitor for mental status changes and reorient to person, place, and time. Review medications for side effects contributing to fall risk. Reinforce activity limits and safety measures with patient and family. Provide visual clues: red socks.

## 2022-12-27 ENCOUNTER — EMERGENCY (EMERGENCY)
Facility: HOSPITAL | Age: 87
LOS: 0 days | Discharge: HOME | End: 2022-12-28
Attending: EMERGENCY MEDICINE | Admitting: EMERGENCY MEDICINE
Payer: MEDICARE

## 2022-12-27 VITALS
HEART RATE: 53 BPM | OXYGEN SATURATION: 98 % | SYSTOLIC BLOOD PRESSURE: 122 MMHG | TEMPERATURE: 97 F | WEIGHT: 130.07 LBS | HEIGHT: 61 IN | DIASTOLIC BLOOD PRESSURE: 58 MMHG | RESPIRATION RATE: 18 BRPM

## 2022-12-27 DIAGNOSIS — D64.9 ANEMIA, UNSPECIFIED: ICD-10-CM

## 2022-12-27 DIAGNOSIS — Z79.82 LONG TERM (CURRENT) USE OF ASPIRIN: ICD-10-CM

## 2022-12-27 DIAGNOSIS — Z87.891 PERSONAL HISTORY OF NICOTINE DEPENDENCE: ICD-10-CM

## 2022-12-27 DIAGNOSIS — E78.5 HYPERLIPIDEMIA, UNSPECIFIED: ICD-10-CM

## 2022-12-27 DIAGNOSIS — I10 ESSENTIAL (PRIMARY) HYPERTENSION: ICD-10-CM

## 2022-12-27 LAB
ALBUMIN SERPL ELPH-MCNC: 3.5 G/DL — SIGNIFICANT CHANGE UP (ref 3.5–5.2)
ALP SERPL-CCNC: 91 U/L — SIGNIFICANT CHANGE UP (ref 30–115)
ALT FLD-CCNC: 15 U/L — SIGNIFICANT CHANGE UP (ref 0–41)
ANION GAP SERPL CALC-SCNC: 6 MMOL/L — LOW (ref 7–14)
APTT BLD: 23.3 SEC — CRITICAL LOW (ref 27–39.2)
AST SERPL-CCNC: 20 U/L — SIGNIFICANT CHANGE UP (ref 0–41)
BASOPHILS # BLD AUTO: 0.02 K/UL — SIGNIFICANT CHANGE UP (ref 0–0.2)
BASOPHILS NFR BLD AUTO: 0.3 % — SIGNIFICANT CHANGE UP (ref 0–1)
BILIRUB SERPL-MCNC: 0.7 MG/DL — SIGNIFICANT CHANGE UP (ref 0.2–1.2)
BUN SERPL-MCNC: 25 MG/DL — HIGH (ref 10–20)
CALCIUM SERPL-MCNC: 8.2 MG/DL — LOW (ref 8.4–10.4)
CHLORIDE SERPL-SCNC: 107 MMOL/L — SIGNIFICANT CHANGE UP (ref 98–110)
CO2 SERPL-SCNC: 26 MMOL/L — SIGNIFICANT CHANGE UP (ref 17–32)
CREAT SERPL-MCNC: 0.5 MG/DL — LOW (ref 0.7–1.5)
EGFR: 85 ML/MIN/1.73M2 — SIGNIFICANT CHANGE UP
EOSINOPHIL # BLD AUTO: 0.17 K/UL — SIGNIFICANT CHANGE UP (ref 0–0.7)
EOSINOPHIL NFR BLD AUTO: 2.7 % — SIGNIFICANT CHANGE UP (ref 0–8)
GLUCOSE SERPL-MCNC: 102 MG/DL — HIGH (ref 70–99)
HCT VFR BLD CALC: 23.2 % — LOW (ref 37–47)
HGB BLD-MCNC: 7.1 G/DL — LOW (ref 12–16)
IMM GRANULOCYTES NFR BLD AUTO: 0.5 % — HIGH (ref 0.1–0.3)
INR BLD: 1.28 RATIO — SIGNIFICANT CHANGE UP (ref 0.65–1.3)
LYMPHOCYTES # BLD AUTO: 2.91 K/UL — SIGNIFICANT CHANGE UP (ref 1.2–3.4)
LYMPHOCYTES # BLD AUTO: 46.2 % — SIGNIFICANT CHANGE UP (ref 20.5–51.1)
MCHC RBC-ENTMCNC: 30.6 G/DL — LOW (ref 32–37)
MCHC RBC-ENTMCNC: 31.1 PG — HIGH (ref 27–31)
MCV RBC AUTO: 101.8 FL — HIGH (ref 81–99)
MONOCYTES # BLD AUTO: 0.97 K/UL — HIGH (ref 0.1–0.6)
MONOCYTES NFR BLD AUTO: 15.4 % — HIGH (ref 1.7–9.3)
NEUTROPHILS # BLD AUTO: 2.2 K/UL — SIGNIFICANT CHANGE UP (ref 1.4–6.5)
NEUTROPHILS NFR BLD AUTO: 34.9 % — LOW (ref 42.2–75.2)
NRBC # BLD: 0 /100 WBCS — SIGNIFICANT CHANGE UP (ref 0–0)
PLATELET # BLD AUTO: 74 K/UL — LOW (ref 130–400)
POTASSIUM SERPL-MCNC: 4 MMOL/L — SIGNIFICANT CHANGE UP (ref 3.5–5)
POTASSIUM SERPL-SCNC: 4 MMOL/L — SIGNIFICANT CHANGE UP (ref 3.5–5)
PROT SERPL-MCNC: 6.6 G/DL — SIGNIFICANT CHANGE UP (ref 6–8)
PROTHROM AB SERPL-ACNC: 14.7 SEC — HIGH (ref 9.95–12.87)
RBC # BLD: 2.28 M/UL — LOW (ref 4.2–5.4)
RBC # FLD: 23.1 % — HIGH (ref 11.5–14.5)
SODIUM SERPL-SCNC: 139 MMOL/L — SIGNIFICANT CHANGE UP (ref 135–146)
WBC # BLD: 6.3 K/UL — SIGNIFICANT CHANGE UP (ref 4.8–10.8)
WBC # FLD AUTO: 6.3 K/UL — SIGNIFICANT CHANGE UP (ref 4.8–10.8)

## 2022-12-27 PROCEDURE — 99283 EMERGENCY DEPT VISIT LOW MDM: CPT

## 2022-12-27 NOTE — ED ADULT NURSE NOTE - OBJECTIVE STATEMENT
PT sent in from [place] for blood transfusion. PT hgb was 6.4. Pt here frequently for blood transfusions. No pain noted

## 2022-12-27 NOTE — ED ADULT NURSE NOTE - CHIEF COMPLAINT QUOTE
PT sent in from [place] for blood transfusion. PT hgb was 6.4. Pt here frequently for blood transfusions. No pain noted

## 2022-12-27 NOTE — ED PROVIDER NOTE - PATIENT PORTAL LINK FT
You can access the FollowMyHealth Patient Portal offered by [facility] by registering at the following website: [email or web address]. By joining [email or web address]’s FollowMyHealth portal, you will also be able to view your health information using other applications (apps) compatible with our system.

## 2022-12-27 NOTE — ED PROVIDER NOTE - NSFOLLOWUPINSTRUCTIONS_ED_ALL_ED_FT
please follow up with your primary care doctor in 1-3 days     Anemia    WHAT YOU NEED TO KNOW:    Anemia is a low number of red blood cells or a low amount of hemoglobin in your red blood cells. Hemoglobin is a protein that helps carry oxygen throughout your body. Red blood cells use iron to create hemoglobin. Anemia may develop if your body does not have enough iron. It may also develop if your body does not make enough red blood cells or they die faster than your body can make them.     DISCHARGE INSTRUCTIONS:    Call 911 or have someone call 911 for any of the following:     You lose consciousness.      You have severe chest pain.    Return to the emergency department if:     You have dark or bloody bowel movements.        Contact your healthcare provider if:     Your symptoms are worse, even after treatment.      You have questions or concerns about your condition or care.    Medicines:     Iron or folic acid supplements help increase your red blood cell and hemoglobin levels.       Vitamin B12 injections may help boost your red blood cell level and decrease your symptoms. Ask your healthcare provider how to inject B12.      Take your medicine as directed. Contact your healthcare provider if you think your medicine is not helping or if you have side effects. Tell him of her if you are allergic to any medicine. Keep a list of the medicines, vitamins, and herbs you take. Include the amounts, and when and why you take them. Bring the list or the pill bottles to follow-up visits. Carry your medicine list with you in case of an emergency.    Prevent anemia: Eat healthy foods rich in iron and vitamin C. Nuts, meat, dark leafy green vegetables, and beans are high in iron and protein. Vitamin C helps your body absorb iron. Foods rich in vitamin C include oranges and other citrus fruits. Ask your healthcare provider for a list of other foods that are high in iron or vitamin C. Ask if you need to be on a special diet.     Follow up with your healthcare provider as directed: Write down your questions so you remember to ask them during your visits.        © Copyright [employer] 2019 All illustrations and images included in CareNotes are the copyrighted property of [employer] or [employer] ([employer])

## 2022-12-27 NOTE — ED PROVIDER NOTE - ATTENDING APP SHARED VISIT CONTRIBUTION OF CARE
89-year-old female past medical history of chronic anemia status post multiple blood transfusions, hypertension, hyperlipidemia presents to ED for anemia.  Patient had routine blood work which demonstrated a hemoglobin of 6.4 she was sent here.  Patient denies any rectal bleeding or black stool.  No abdominal pain.  No nausea no vomiting.  Patient does not have any concerns at this time.    Const: NAD  Eyes: PERRL, no conjunctival injection  HENT:  Neck supple without meningismus   CV: RRR, Warm, well-perfused extremities  RESP: CTA B/L, no tachypnea   GI: soft, non-tender, non-distended  MSK: No gross deformities appreciated  Skin: Warm, dry. No rashes  Neuro: Alert, CNs II-XII grossly intact. Sensation and motor function of extremities grossly intact.  Psych: Appropriate mood and affect.    will do labs and transfuse if <7

## 2022-12-27 NOTE — ED PROVIDER NOTE - PHYSICAL EXAMINATION
VITAL SIGNS: I have reviewed nursing notes and confirm.  CONSTITUTIONAL:  in no acute distress.  SKIN: Skin exam is warm and dry, no acute rash.  HEAD: Normocephalic; atraumatic.  EYES: PERRL, EOM intact; conjunctiva pale and sclera clear.  ENT: No nasal discharge; airway clear  NECK: Supple; non tender.  CARD: S1, S2 normal; no murmurs, gallops, or rubs. Regular rate and rhythm.  RESP: No wheezes, rales or rhonchi. Speaking in full sentences.   ABD: Normal bowel sounds; soft; non-distended; non-tender; No rebound or guarding

## 2022-12-27 NOTE — ED PROVIDER NOTE - OBJECTIVE STATEMENT
89 female history of hyperlipidemia, hypertension, chronic anemia presenting from [place] for low hemoglobin.  States that her hemoglobin is usually low and she is unsure of the source.  Denies any hematuria or rectal bleeding.

## 2022-12-27 NOTE — ED ADULT NURSE NOTE - NSIMPLEMENTINTERV_GEN_ALL_ED
Implemented All Fall Risk Interventions:  [place] to call system. Call bell, personal items and telephone within reach. Instruct patient to call for assistance. Room bathroom lighting operational. Non-slip footwear when patient is off stretcher. Physically safe environment: no spills, clutter or unnecessary equipment. Stretcher in lowest position, wheels locked, appropriate side rails in place. Provide visual cue, wrist band, yellow gown, etc. Monitor gait and stability. Monitor for mental status changes and reorient to person, place, and time. Review medications for side effects contributing to fall risk. Reinforce activity limits and safety measures with patient and family.

## 2022-12-28 VITALS
HEART RATE: 70 BPM | DIASTOLIC BLOOD PRESSURE: 65 MMHG | TEMPERATURE: 98 F | RESPIRATION RATE: 18 BRPM | SYSTOLIC BLOOD PRESSURE: 135 MMHG | OXYGEN SATURATION: 98 %

## 2022-12-28 LAB — BLD GP AB SCN SERPL QL: SIGNIFICANT CHANGE UP

## 2023-01-24 ENCOUNTER — EMERGENCY (EMERGENCY)
Facility: HOSPITAL | Age: 88
LOS: 0 days | Discharge: HOME | End: 2023-01-25
Attending: EMERGENCY MEDICINE | Admitting: EMERGENCY MEDICINE
Payer: MEDICARE

## 2023-01-24 VITALS
TEMPERATURE: 97 F | SYSTOLIC BLOOD PRESSURE: 118 MMHG | RESPIRATION RATE: 18 BRPM | OXYGEN SATURATION: 95 % | DIASTOLIC BLOOD PRESSURE: 57 MMHG | HEART RATE: 61 BPM

## 2023-01-24 DIAGNOSIS — Z20.822 CONTACT WITH AND (SUSPECTED) EXPOSURE TO COVID-19: ICD-10-CM

## 2023-01-24 DIAGNOSIS — D64.9 ANEMIA, UNSPECIFIED: ICD-10-CM

## 2023-01-24 DIAGNOSIS — Z88.2 ALLERGY STATUS TO SULFONAMIDES: ICD-10-CM

## 2023-01-24 DIAGNOSIS — R79.89 OTHER SPECIFIED ABNORMAL FINDINGS OF BLOOD CHEMISTRY: ICD-10-CM

## 2023-01-24 DIAGNOSIS — I10 ESSENTIAL (PRIMARY) HYPERTENSION: ICD-10-CM

## 2023-01-24 DIAGNOSIS — E78.00 PURE HYPERCHOLESTEROLEMIA, UNSPECIFIED: ICD-10-CM

## 2023-01-24 LAB
ALBUMIN SERPL ELPH-MCNC: 3.1 G/DL — LOW (ref 3.5–5.2)
ALP SERPL-CCNC: 96 U/L — SIGNIFICANT CHANGE UP (ref 30–115)
ALT FLD-CCNC: 15 U/L — SIGNIFICANT CHANGE UP (ref 0–41)
ANION GAP SERPL CALC-SCNC: 6 MMOL/L — LOW (ref 7–14)
ANISOCYTOSIS BLD QL: SIGNIFICANT CHANGE UP
AST SERPL-CCNC: 17 U/L — SIGNIFICANT CHANGE UP (ref 0–41)
BASOPHILS # BLD AUTO: 0 K/UL — SIGNIFICANT CHANGE UP (ref 0–0.2)
BASOPHILS NFR BLD AUTO: 0 % — SIGNIFICANT CHANGE UP (ref 0–1)
BILIRUB SERPL-MCNC: 0.8 MG/DL — SIGNIFICANT CHANGE UP (ref 0.2–1.2)
BLD GP AB SCN SERPL QL: SIGNIFICANT CHANGE UP
BUN SERPL-MCNC: 21 MG/DL — HIGH (ref 10–20)
CALCIUM SERPL-MCNC: 8.4 MG/DL — SIGNIFICANT CHANGE UP (ref 8.4–10.5)
CHLORIDE SERPL-SCNC: 107 MMOL/L — SIGNIFICANT CHANGE UP (ref 98–110)
CO2 SERPL-SCNC: 26 MMOL/L — SIGNIFICANT CHANGE UP (ref 17–32)
CREAT SERPL-MCNC: 0.5 MG/DL — LOW (ref 0.7–1.5)
EGFR: 84 ML/MIN/1.73M2 — SIGNIFICANT CHANGE UP
EOSINOPHIL # BLD AUTO: 0.18 K/UL — SIGNIFICANT CHANGE UP (ref 0–0.7)
EOSINOPHIL NFR BLD AUTO: 2.6 % — SIGNIFICANT CHANGE UP (ref 0–8)
GLUCOSE SERPL-MCNC: 118 MG/DL — HIGH (ref 70–99)
HCT VFR BLD CALC: 20.3 % — LOW (ref 37–47)
HGB BLD-MCNC: 6.4 G/DL — CRITICAL LOW (ref 12–16)
LYMPHOCYTES # BLD AUTO: 2.81 K/UL — SIGNIFICANT CHANGE UP (ref 1.2–3.4)
LYMPHOCYTES # BLD AUTO: 39.7 % — SIGNIFICANT CHANGE UP (ref 20.5–51.1)
MACROCYTES BLD QL: SIGNIFICANT CHANGE UP
MANUAL SMEAR VERIFICATION: SIGNIFICANT CHANGE UP
MCHC RBC-ENTMCNC: 31.2 PG — HIGH (ref 27–31)
MCHC RBC-ENTMCNC: 31.5 G/DL — LOW (ref 32–37)
MCV RBC AUTO: 99 FL — SIGNIFICANT CHANGE UP (ref 81–99)
MONOCYTES # BLD AUTO: 0.79 K/UL — HIGH (ref 0.1–0.6)
MONOCYTES NFR BLD AUTO: 11.2 % — HIGH (ref 1.7–9.3)
NEUTROPHILS # BLD AUTO: 3.06 K/UL — SIGNIFICANT CHANGE UP (ref 1.4–6.5)
NEUTROPHILS NFR BLD AUTO: 43.1 % — SIGNIFICANT CHANGE UP (ref 42.2–75.2)
OVALOCYTES BLD QL SMEAR: SLIGHT — SIGNIFICANT CHANGE UP
PLAT MORPH BLD: ABNORMAL
PLATELET # BLD AUTO: 89 K/UL — LOW (ref 130–400)
POIKILOCYTOSIS BLD QL AUTO: SLIGHT — SIGNIFICANT CHANGE UP
POLYCHROMASIA BLD QL SMEAR: SLIGHT — SIGNIFICANT CHANGE UP
POTASSIUM SERPL-MCNC: 3.7 MMOL/L — SIGNIFICANT CHANGE UP (ref 3.5–5)
POTASSIUM SERPL-SCNC: 3.7 MMOL/L — SIGNIFICANT CHANGE UP (ref 3.5–5)
PROT SERPL-MCNC: 6.2 G/DL — SIGNIFICANT CHANGE UP (ref 6–8)
RBC # BLD: 2.05 M/UL — LOW (ref 4.2–5.4)
RBC # FLD: 25.2 % — HIGH (ref 11.5–14.5)
RBC BLD AUTO: ABNORMAL
SARS-COV-2 RNA SPEC QL NAA+PROBE: SIGNIFICANT CHANGE UP
SODIUM SERPL-SCNC: 139 MMOL/L — SIGNIFICANT CHANGE UP (ref 135–146)
VARIANT LYMPHS # BLD: 3.4 % — SIGNIFICANT CHANGE UP (ref 0–5)
WBC # BLD: 7.09 K/UL — SIGNIFICANT CHANGE UP (ref 4.8–10.8)
WBC # FLD AUTO: 7.09 K/UL — SIGNIFICANT CHANGE UP (ref 4.8–10.8)

## 2023-01-24 PROCEDURE — 99223 1ST HOSP IP/OBS HIGH 75: CPT

## 2023-01-24 NOTE — ED PROVIDER NOTE - OBJECTIVE STATEMENT
89-year-old female from [facility] history of hypertension, hyperlipidemia, chronic anemia on Procrit, venofir presenting to ER with hemoglobin of 6.2.  Patient denies any acute complaints.  No shortness of breath, chest pain, lower extremity swelling, weakness, lightheadedness, dizziness, syncope.

## 2023-01-24 NOTE — ED PROVIDER NOTE - PHYSICAL EXAMINATION
CONSTITUTIONAL: Well-appearing; well-nourished; in no apparent distress.   EYES: PERRL; EOM intact.   ENT: normal nose; no rhinorrhea; normal pharynx with no tonsillar hypertrophy.   NECK: Supple; non-tender; no cervical lymphadenopathy.   CARDIOVASCULAR: Normal S1, S2; no murmurs, rubs, or gallops. Equal radial pulses  RESPIRATORY: Normal chest excursion with respiration; breath sounds clear and equal bilaterally; no wheezes, rhonchi, or rales.  GI/: Normal bowel sounds; non-distended; non-tender; no palpable organomegaly.   MS: No evidence of trauma or deformity.  Normal ROM in all four extremities; non-tender to palpation; distal pulses are normal.   SKIN: Normal for age and race; warm; dry; good turgor; no apparent lesions or exudate.   NEURO/PSYCH: A & O x 4; grossly unremarkable. mood and manner are appropriate. Grooming and personal hygiene are appropriate.

## 2023-01-24 NOTE — ED CDU PROVIDER INITIAL DAY NOTE - OBJECTIVE STATEMENT
89-year-old female from [facility] history of hypertension, hyperlipidemia, chronic anemia on Procrit, venofir presenting to ER with hemoglobin of 6.2.  Patient denies any acute complaints.  No shortness of breath, chest pain, lower extremity swelling, weakness, lightheadedness, dizziness, syncope.

## 2023-01-24 NOTE — ED PROVIDER NOTE - ATTENDING APP SHARED VISIT CONTRIBUTION OF CARE
89yo woman h/o HTN, HLD, chronic anemia on procrit and venifer presents due to low outpt Hb. Pt requires frequent transfusions. On exam she is awake and alert, and denies complaints. Appears frail but nontoxic. Lungs CTA, CVS1S2 RRR, abd soft, NT. Will confirm labs and place in CDU for transfusion.

## 2023-01-24 NOTE — ED ADULT TRIAGE NOTE - CHIEF COMPLAINT QUOTE
She's from [place], she's being sent here for a blood transfusion. Her hemoglobin is 6.2 - EMS  Patient denies any symptoms

## 2023-01-24 NOTE — ED ADULT NURSE NOTE - CHIEF COMPLAINT QUOTE
She's from [place], she's being sent here for a blood transfusion. Her hemoglobin is 6.2 - EMS  Patient denies any symptoms

## 2023-01-24 NOTE — ED ADULT NURSE NOTE - NSIMPLEMENTINTERV_GEN_ALL_ED
Implemented All Fall with Harm Risk Interventions:  [place] to call system. Call bell, personal items and telephone within reach. Instruct patient to call for assistance. Room bathroom lighting operational. Non-slip footwear when patient is off stretcher. Physically safe environment: no spills, clutter or unnecessary equipment. Stretcher in lowest position, wheels locked, appropriate side rails in place. Provide visual cue, wrist band, yellow gown, etc. Monitor gait and stability. Monitor for mental status changes and reorient to person, place, and time. Review medications for side effects contributing to fall risk. Reinforce activity limits and safety measures with patient and family. Provide visual clues: red socks.

## 2023-01-24 NOTE — ED PROVIDER NOTE - CONSIDERATION OF ADMISSION OBSERVATION
requires transfusion of PRBCs for Hb 6.4. No acute workup needed at this time as pt has long hx of anemia requiring multiple transfusions in the past. Consideration of Admission/Observation

## 2023-01-25 VITALS — TEMPERATURE: 98 F | HEART RATE: 72 BPM | OXYGEN SATURATION: 98 % | RESPIRATION RATE: 18 BRPM

## 2023-01-25 PROCEDURE — 99238 HOSP IP/OBS DSCHRG MGMT 30/<: CPT

## 2023-01-25 NOTE — ED CDU PROVIDER DISPOSITION NOTE - CLINICAL COURSE
[facility] for transfusion - remained HD stable during stay, transfused w/o issue, ctab, d/c to home w/HemOnc f/u as outpt

## 2023-01-25 NOTE — ED CDU PROVIDER SUBSEQUENT DAY NOTE - PROGRESS NOTE DETAILS
Pt receiving PRBC. Comfortable appearing, No acute complaints in ed, Will reassess Authored by Dr. Rosy Hernández: [facility]OR[facility] for blood transfusion, completed w/o incident, disccharged awaiting transport to [facility] - Dr. Red aware

## 2023-01-25 NOTE — ED CDU PROVIDER DISPOSITION NOTE - PATIENT PORTAL LINK FT
You can access the FollowMyHealth Patient Portal offered by [facility] by registering at the following website: [email or web address]. By joining [email or web address]’s FollowMyHealth portal, you will also be able to view your health information using other applications (apps) compatible with our system.

## 2023-01-25 NOTE — ED ADULT NURSE REASSESSMENT NOTE - NS ED NURSE REASSESS COMMENT FT1
1 units pRBCs completed, pt resting comfortably on stretcher, breathing even and unlabored on RA, VSS, pt updated on plan of care, pending lab redraw, blood transfusion sheet placed in pt chart.

## 2023-01-25 NOTE — ED CDU PROVIDER DISPOSITION NOTE - CARE PROVIDER_API CALL
Shawnee Bhakta)  Hematology; Internal Medicine; Medical Oncology  [address] [place] [address]  Phone: [phone]  Fax: [phone]  Follow Up Time:

## 2023-01-25 NOTE — ED CDU PROVIDER DISPOSITION NOTE - NS ED ATTENDING STATEMENT MOD
This was a shared visit with the ANAHY. I reviewed and verified the documentation and independently performed the documented: Attending with

## 2023-01-31 ENCOUNTER — EMERGENCY (EMERGENCY)
Facility: HOSPITAL | Age: 88
LOS: 0 days | Discharge: HOME | End: 2023-02-01
Attending: EMERGENCY MEDICINE | Admitting: EMERGENCY MEDICINE
Payer: MEDICARE

## 2023-01-31 VITALS
RESPIRATION RATE: 18 BRPM | HEIGHT: 61 IN | TEMPERATURE: 96 F | OXYGEN SATURATION: 96 % | DIASTOLIC BLOOD PRESSURE: 63 MMHG | SYSTOLIC BLOOD PRESSURE: 130 MMHG | HEART RATE: 60 BPM

## 2023-01-31 DIAGNOSIS — D64.9 ANEMIA, UNSPECIFIED: ICD-10-CM

## 2023-01-31 DIAGNOSIS — Z79.82 LONG TERM (CURRENT) USE OF ASPIRIN: ICD-10-CM

## 2023-01-31 DIAGNOSIS — Z86.2 PERSONAL HISTORY OF DISEASES OF THE BLOOD AND BLOOD-FORMING ORGANS AND CERTAIN DISORDERS INVOLVING THE IMMUNE MECHANISM: ICD-10-CM

## 2023-01-31 DIAGNOSIS — E78.5 HYPERLIPIDEMIA, UNSPECIFIED: ICD-10-CM

## 2023-01-31 DIAGNOSIS — I10 ESSENTIAL (PRIMARY) HYPERTENSION: ICD-10-CM

## 2023-01-31 DIAGNOSIS — Z88.2 ALLERGY STATUS TO SULFONAMIDES: ICD-10-CM

## 2023-01-31 LAB
ALBUMIN SERPL ELPH-MCNC: 3.1 G/DL — LOW (ref 3.5–5.2)
ALP SERPL-CCNC: 94 U/L — SIGNIFICANT CHANGE UP (ref 30–115)
ALT FLD-CCNC: 17 U/L — SIGNIFICANT CHANGE UP (ref 0–41)
ANION GAP SERPL CALC-SCNC: 9 MMOL/L — SIGNIFICANT CHANGE UP (ref 7–14)
AST SERPL-CCNC: 17 U/L — SIGNIFICANT CHANGE UP (ref 0–41)
BASOPHILS # BLD AUTO: 0.01 K/UL — SIGNIFICANT CHANGE UP (ref 0–0.2)
BASOPHILS NFR BLD AUTO: 0.1 % — SIGNIFICANT CHANGE UP (ref 0–1)
BILIRUB SERPL-MCNC: 0.8 MG/DL — SIGNIFICANT CHANGE UP (ref 0.2–1.2)
BLD GP AB SCN SERPL QL: SIGNIFICANT CHANGE UP
BUN SERPL-MCNC: 21 MG/DL — HIGH (ref 10–20)
CALCIUM SERPL-MCNC: 8.5 MG/DL — SIGNIFICANT CHANGE UP (ref 8.4–10.5)
CHLORIDE SERPL-SCNC: 105 MMOL/L — SIGNIFICANT CHANGE UP (ref 98–110)
CO2 SERPL-SCNC: 26 MMOL/L — SIGNIFICANT CHANGE UP (ref 17–32)
CREAT SERPL-MCNC: <0.5 MG/DL — LOW (ref 0.7–1.5)
EGFR: 89 ML/MIN/1.73M2 — SIGNIFICANT CHANGE UP
EOSINOPHIL # BLD AUTO: 0.15 K/UL — SIGNIFICANT CHANGE UP (ref 0–0.7)
EOSINOPHIL NFR BLD AUTO: 2 % — SIGNIFICANT CHANGE UP (ref 0–8)
GLUCOSE SERPL-MCNC: 101 MG/DL — HIGH (ref 70–99)
HCT VFR BLD CALC: 24 % — LOW (ref 37–47)
HGB BLD-MCNC: 7.5 G/DL — LOW (ref 12–16)
IMM GRANULOCYTES NFR BLD AUTO: 0.4 % — HIGH (ref 0.1–0.3)
LYMPHOCYTES # BLD AUTO: 3.22 K/UL — SIGNIFICANT CHANGE UP (ref 1.2–3.4)
LYMPHOCYTES # BLD AUTO: 43.7 % — SIGNIFICANT CHANGE UP (ref 20.5–51.1)
MCHC RBC-ENTMCNC: 30.9 PG — SIGNIFICANT CHANGE UP (ref 27–31)
MCHC RBC-ENTMCNC: 31.3 G/DL — LOW (ref 32–37)
MCV RBC AUTO: 98.8 FL — SIGNIFICANT CHANGE UP (ref 81–99)
MONOCYTES # BLD AUTO: 1.22 K/UL — HIGH (ref 0.1–0.6)
MONOCYTES NFR BLD AUTO: 16.6 % — HIGH (ref 1.7–9.3)
NEUTROPHILS # BLD AUTO: 2.74 K/UL — SIGNIFICANT CHANGE UP (ref 1.4–6.5)
NEUTROPHILS NFR BLD AUTO: 37.2 % — LOW (ref 42.2–75.2)
NRBC # BLD: 0 /100 WBCS — SIGNIFICANT CHANGE UP (ref 0–0)
PLATELET # BLD AUTO: 62 K/UL — LOW (ref 130–400)
POTASSIUM SERPL-MCNC: 3.4 MMOL/L — LOW (ref 3.5–5)
POTASSIUM SERPL-SCNC: 3.4 MMOL/L — LOW (ref 3.5–5)
PROT SERPL-MCNC: 6.2 G/DL — SIGNIFICANT CHANGE UP (ref 6–8)
RBC # BLD: 2.43 M/UL — LOW (ref 4.2–5.4)
RBC # FLD: 22.6 % — HIGH (ref 11.5–14.5)
SODIUM SERPL-SCNC: 140 MMOL/L — SIGNIFICANT CHANGE UP (ref 135–146)
WBC # BLD: 7.37 K/UL — SIGNIFICANT CHANGE UP (ref 4.8–10.8)
WBC # FLD AUTO: 7.37 K/UL — SIGNIFICANT CHANGE UP (ref 4.8–10.8)

## 2023-01-31 PROCEDURE — 99284 EMERGENCY DEPT VISIT MOD MDM: CPT

## 2023-01-31 NOTE — ED ADULT TRIAGE NOTE - SPO2 (%)
Dr. Gant Si notified of consult via [address] [place] [id number]. Electronically signed by Delicia Craig RN on 3/15/2020 at 6:04 PM 96

## 2023-01-31 NOTE — ED ADULT TRIAGE NOTE - NS ED TRIAGE AVPU SCALE
normal Alert-The patient is alert, awake and responds to voice. The patient is oriented to time, place, and person. The triage nurse is able to obtain subjective information.

## 2023-01-31 NOTE — ED ADULT NURSE REASSESSMENT NOTE - NS ED NURSE REASSESS COMMENT FT1
Assumed care from previous nurse Tejal c/o anemia ,sent from [facility] c/o low hemoglobin level, pt AO x 4 , no SOB , denies any pain , denies dizziness , denies chest pain, fall alarm on

## 2023-01-31 NOTE — ED ADULT TRIAGE NOTE - CHIEF COMPLAINT QUOTE
Quality 111:Pneumonia Vaccination Status For Older Adults: Pneumococcal vaccine (PPSV23) administered on or after patientâs 60th birthday and before the end of the measurement period Quality 226: Preventive Care And Screening: Tobacco Use: Screening And Cessation Intervention: Patient screened for tobacco use and is an ex/non-smoker Detail Level: Detailed Quality 47: Advance Care Plan: Advance Care Planning discussed and documented; advance care plan or surrogate decision maker documented in the medical record. Quality 110: Preventive Care And Screening: Influenza Immunization: Influenza Immunization Administered during Influenza season Quality 402: Tobacco Use And Help With Quitting Among Adolescents: Patient screened for tobacco and never smoked Quality 130: Documentation Of Current Medications In The Medical Record: Current Medications Documented pt sent in from [facility] for low hemoglobin.

## 2023-02-01 VITALS
DIASTOLIC BLOOD PRESSURE: 69 MMHG | RESPIRATION RATE: 18 BRPM | TEMPERATURE: 97 F | OXYGEN SATURATION: 99 % | HEART RATE: 63 BPM | SYSTOLIC BLOOD PRESSURE: 146 MMHG

## 2023-02-01 NOTE — ED PROVIDER NOTE - NSFOLLOWUPINSTRUCTIONS_ED_ALL_ED_FT
Anemia    hgb 7.5 in ED. 1unit prbc given.     Anemia is a condition in which the concentration of red blood cells or hemoglobin in the blood is below normal. Hemoglobin is a substance in red blood cells that carries oxygen to the tissues of the body. Anemia results in not enough oxygen reaching these tissues which can cause symptoms such as weakness, dizziness/lightheadedness, shortness of breath, chest pain, paleness, or nausea.    SEEK IMMEDIATE MEDICAL CARE IF YOU HAVE THE FOLLOWING SYMPTOMS: extreme weakness/chest pain/shortness of breath, black or bloody stools, vomiting blood, fainting, fever, or any signs of dehydration.

## 2023-02-01 NOTE — ED PROVIDER NOTE - PATIENT PORTAL LINK FT
You can access the FollowMyHealth Patient Portal offered by [facility] by registering at the following website: [email or web address]. By joining [employer]’s FollowMyHealth portal, you will also be able to view your health information using other applications (apps) compatible with our system.

## 2023-02-01 NOTE — ED PROVIDER NOTE - OBJECTIVE STATEMENT
89 yo female hx of HTN/HLD/chronic anemia sent from [facility] for low hemoglobin (6.5) and needed blood transfusion. last transfusion 1 week ago. reports feeling cold all the time. denies fever/chest pain/sob/palpitations/ abd pain/ n/v/d/ blood and black stool.

## 2023-02-01 NOTE — ED ADULT NURSE NOTE - NSIMPLEMENTINTERV_GEN_ALL_ED
Implemented All Fall Risk Interventions:  [place] to call system. Call bell, personal items and telephone within reach. Instruct patient to call for assistance. Room bathroom lighting operational. Non-slip footwear when patient is off stretcher. Physically safe environment: no spills, clutter or unnecessary equipment. Stretcher in lowest position, wheels locked, appropriate side rails in place. Provide visual cue, wrist band, yellow gown, etc. Monitor gait and stability. Monitor for mental status changes and reorient to person, place, and time. Review medications for side effects contributing to fall risk. Reinforce activity limits and safety measures with patient and family.

## 2023-02-01 NOTE — ED PROVIDER NOTE - PHYSICAL EXAMINATION
CONSTITUTIONAL: Well-appearing; well-nourished; in no apparent distress.   EYES: PERRL; EOM intact.   CARDIOVASCULAR: Normal S1, S2; no murmurs, rubs, or gallops.   RESPIRATORY: Normal chest excursion with respiration; breath sounds clear and equal bilaterally; no wheezes, rhonchi, or rales.  GI/: Normal bowel sounds; non-distended; non-tender; no palpable organomegaly.   MS: trace edema to b/l LE.   SKIN: Normal for age and race; warm; dry; good turgor; no apparent lesions or exudate.   NEURO/PSYCH: A & O x 4; grossly unremarkable.

## 2023-02-21 ENCOUNTER — EMERGENCY (EMERGENCY)
Facility: HOSPITAL | Age: 88
LOS: 0 days | Discharge: HOME | End: 2023-02-22
Attending: EMERGENCY MEDICINE
Payer: MEDICARE

## 2023-02-21 VITALS
OXYGEN SATURATION: 94 % | TEMPERATURE: 99 F | HEART RATE: 82 BPM | HEIGHT: 61 IN | RESPIRATION RATE: 20 BRPM | DIASTOLIC BLOOD PRESSURE: 62 MMHG | SYSTOLIC BLOOD PRESSURE: 146 MMHG

## 2023-02-21 DIAGNOSIS — R79.89 OTHER SPECIFIED ABNORMAL FINDINGS OF BLOOD CHEMISTRY: ICD-10-CM

## 2023-02-21 DIAGNOSIS — D64.9 ANEMIA, UNSPECIFIED: ICD-10-CM

## 2023-02-21 DIAGNOSIS — Z79.82 LONG TERM (CURRENT) USE OF ASPIRIN: ICD-10-CM

## 2023-02-21 DIAGNOSIS — Z88.2 ALLERGY STATUS TO SULFONAMIDES: ICD-10-CM

## 2023-02-21 LAB
HCT VFR BLD CALC: 22 % — LOW (ref 37–47)
HGB BLD-MCNC: 6.9 G/DL — CRITICAL LOW (ref 12–16)
MCHC RBC-ENTMCNC: 31.2 PG — HIGH (ref 27–31)
MCHC RBC-ENTMCNC: 31.4 G/DL — LOW (ref 32–37)
MCV RBC AUTO: 99.5 FL — HIGH (ref 81–99)
PLATELET # BLD AUTO: 61 K/UL — LOW (ref 130–400)
RBC # BLD: 2.21 M/UL — LOW (ref 4.2–5.4)
RBC # FLD: 24.4 % — HIGH (ref 11.5–14.5)
WBC # BLD: 10.48 K/UL — SIGNIFICANT CHANGE UP (ref 4.8–10.8)
WBC # FLD AUTO: 10.48 K/UL — SIGNIFICANT CHANGE UP (ref 4.8–10.8)

## 2023-02-21 PROCEDURE — 80048 BASIC METABOLIC PNL TOTAL CA: CPT

## 2023-02-21 PROCEDURE — 99284 EMERGENCY DEPT VISIT MOD MDM: CPT

## 2023-02-21 PROCEDURE — 36430 TRANSFUSION BLD/BLD COMPNT: CPT

## 2023-02-21 PROCEDURE — P9016: CPT

## 2023-02-21 PROCEDURE — 86900 BLOOD TYPING SEROLOGIC ABO: CPT

## 2023-02-21 PROCEDURE — 86850 RBC ANTIBODY SCREEN: CPT

## 2023-02-21 PROCEDURE — 86901 BLOOD TYPING SEROLOGIC RH(D): CPT

## 2023-02-21 PROCEDURE — 86923 COMPATIBILITY TEST ELECTRIC: CPT

## 2023-02-21 PROCEDURE — 36415 COLL VENOUS BLD VENIPUNCTURE: CPT

## 2023-02-21 PROCEDURE — 85025 COMPLETE CBC W/AUTO DIFF WBC: CPT

## 2023-02-21 PROCEDURE — 99283 EMERGENCY DEPT VISIT LOW MDM: CPT

## 2023-02-21 NOTE — ED PROVIDER NOTE - PATIENT PORTAL LINK FT
You can access the FollowMyHealth Patient Portal offered by [facility] by registering at the following website: [email or web address]. By joining [employer]’s FollowMyHealth portal, you will also be able to view your health information using other applications (apps) compatible with our system.

## 2023-02-22 LAB
ANION GAP SERPL CALC-SCNC: 9 MMOL/L — SIGNIFICANT CHANGE UP (ref 7–14)
BASOPHILS # BLD AUTO: 0 K/UL — SIGNIFICANT CHANGE UP (ref 0–0.2)
BASOPHILS NFR BLD AUTO: 0 % — SIGNIFICANT CHANGE UP (ref 0–1)
BLD GP AB SCN SERPL QL: SIGNIFICANT CHANGE UP
BUN SERPL-MCNC: 18 MG/DL — SIGNIFICANT CHANGE UP (ref 10–20)
CALCIUM SERPL-MCNC: 8.2 MG/DL — LOW (ref 8.4–10.5)
CHLORIDE SERPL-SCNC: 107 MMOL/L — SIGNIFICANT CHANGE UP (ref 98–110)
CO2 SERPL-SCNC: 24 MMOL/L — SIGNIFICANT CHANGE UP (ref 17–32)
CREAT SERPL-MCNC: 0.5 MG/DL — LOW (ref 0.7–1.5)
EGFR: 84 ML/MIN/1.73M2 — SIGNIFICANT CHANGE UP
EOSINOPHIL # BLD AUTO: 0.54 K/UL — SIGNIFICANT CHANGE UP (ref 0–0.7)
EOSINOPHIL NFR BLD AUTO: 5.2 % — SIGNIFICANT CHANGE UP (ref 0–8)
GLUCOSE SERPL-MCNC: 134 MG/DL — HIGH (ref 70–99)
LYMPHOCYTES # BLD AUTO: 38.6 % — SIGNIFICANT CHANGE UP (ref 20.5–51.1)
LYMPHOCYTES # BLD AUTO: 4.05 K/UL — HIGH (ref 1.2–3.4)
MONOCYTES # BLD AUTO: 1.66 K/UL — HIGH (ref 0.1–0.6)
MONOCYTES NFR BLD AUTO: 15.8 % — HIGH (ref 1.7–9.3)
NEUTROPHILS # BLD AUTO: 3.5 K/UL — SIGNIFICANT CHANGE UP (ref 1.4–6.5)
NEUTROPHILS NFR BLD AUTO: 28.1 % — LOW (ref 42.2–75.2)
POTASSIUM SERPL-MCNC: 3 MMOL/L — LOW (ref 3.5–5)
POTASSIUM SERPL-SCNC: 3 MMOL/L — LOW (ref 3.5–5)
SODIUM SERPL-SCNC: 140 MMOL/L — SIGNIFICANT CHANGE UP (ref 135–146)

## 2023-03-24 ENCOUNTER — INPATIENT (INPATIENT)
Facility: HOSPITAL | Age: 88
LOS: 6 days | Discharge: HOME HEALTH | DRG: 834 | End: 2023-03-31
Attending: STUDENT IN AN ORGANIZED HEALTH CARE EDUCATION/TRAINING PROGRAM | Admitting: STUDENT IN AN ORGANIZED HEALTH CARE EDUCATION/TRAINING PROGRAM
Payer: MEDICARE

## 2023-03-24 VITALS
DIASTOLIC BLOOD PRESSURE: 50 MMHG | HEIGHT: 61 IN | RESPIRATION RATE: 17 BRPM | WEIGHT: 119.93 LBS | OXYGEN SATURATION: 99 % | HEART RATE: 80 BPM | SYSTOLIC BLOOD PRESSURE: 100 MMHG | TEMPERATURE: 98 F

## 2023-03-24 PROCEDURE — 99285 EMERGENCY DEPT VISIT HI MDM: CPT

## 2023-03-25 DIAGNOSIS — D64.9 ANEMIA, UNSPECIFIED: ICD-10-CM

## 2023-03-25 LAB
BASOPHILS # BLD AUTO: 0.33 K/UL — HIGH (ref 0–0.2)
BASOPHILS NFR BLD AUTO: 0.7 % — SIGNIFICANT CHANGE UP (ref 0–1)
EOSINOPHIL # BLD AUTO: 0.41 K/UL — SIGNIFICANT CHANGE UP (ref 0–0.7)
EOSINOPHIL NFR BLD AUTO: 0.9 % — SIGNIFICANT CHANGE UP (ref 0–8)
IMM GRANULOCYTES NFR BLD AUTO: 2.5 % — HIGH (ref 0.1–0.3)
LYMPHOCYTES # BLD AUTO: 10.18 K/UL — HIGH (ref 1.2–3.4)
LYMPHOCYTES # BLD AUTO: 23.1 % — SIGNIFICANT CHANGE UP (ref 20.5–51.1)
MONOCYTES # BLD AUTO: 24.38 K/UL — HIGH (ref 0.1–0.6)
MONOCYTES NFR BLD AUTO: 55.2 % — HIGH (ref 1.7–9.3)
NEUTROPHILS # BLD AUTO: 7.74 K/UL — HIGH (ref 1.4–6.5)
NEUTROPHILS NFR BLD AUTO: 17.6 % — LOW (ref 42.2–75.2)
RBC # BLD: 2.66 M/UL — LOW (ref 4.2–5.4)

## 2023-03-25 PROCEDURE — 83615 LACTATE (LD) (LDH) ENZYME: CPT

## 2023-03-25 PROCEDURE — 85384 FIBRINOGEN ACTIVITY: CPT

## 2023-03-25 PROCEDURE — 71045 X-RAY EXAM CHEST 1 VIEW: CPT | Mod: 26

## 2023-03-25 PROCEDURE — 84484 ASSAY OF TROPONIN QUANT: CPT

## 2023-03-25 PROCEDURE — 84132 ASSAY OF SERUM POTASSIUM: CPT

## 2023-03-25 PROCEDURE — 85610 PROTHROMBIN TIME: CPT

## 2023-03-25 PROCEDURE — 82607 VITAMIN B-12: CPT

## 2023-03-25 PROCEDURE — 36415 COLL VENOUS BLD VENIPUNCTURE: CPT

## 2023-03-25 PROCEDURE — 93970 EXTREMITY STUDY: CPT

## 2023-03-25 PROCEDURE — 99223 1ST HOSP IP/OBS HIGH 75: CPT

## 2023-03-25 PROCEDURE — 86140 C-REACTIVE PROTEIN: CPT

## 2023-03-25 PROCEDURE — 86850 RBC ANTIBODY SCREEN: CPT

## 2023-03-25 PROCEDURE — 84295 ASSAY OF SERUM SODIUM: CPT

## 2023-03-25 PROCEDURE — 86663 EPSTEIN-BARR ANTIBODY: CPT

## 2023-03-25 PROCEDURE — 83735 ASSAY OF MAGNESIUM: CPT

## 2023-03-25 PROCEDURE — 94640 AIRWAY INHALATION TREATMENT: CPT

## 2023-03-25 PROCEDURE — 82746 ASSAY OF FOLIC ACID SERUM: CPT

## 2023-03-25 PROCEDURE — 93005 ELECTROCARDIOGRAM TRACING: CPT

## 2023-03-25 PROCEDURE — 97162 PT EVAL MOD COMPLEX 30 MIN: CPT | Mod: GP

## 2023-03-25 PROCEDURE — 87205 SMEAR GRAM STAIN: CPT

## 2023-03-25 PROCEDURE — 81001 URINALYSIS AUTO W/SCOPE: CPT

## 2023-03-25 PROCEDURE — 70450 CT HEAD/BRAIN W/O DYE: CPT

## 2023-03-25 PROCEDURE — 84443 ASSAY THYROID STIM HORMONE: CPT

## 2023-03-25 PROCEDURE — 85018 HEMOGLOBIN: CPT

## 2023-03-25 PROCEDURE — 87040 BLOOD CULTURE FOR BACTERIA: CPT

## 2023-03-25 PROCEDURE — 85730 THROMBOPLASTIN TIME PARTIAL: CPT

## 2023-03-25 PROCEDURE — 92610 EVALUATE SWALLOWING FUNCTION: CPT | Mod: GN

## 2023-03-25 PROCEDURE — 83036 HEMOGLOBIN GLYCOSYLATED A1C: CPT

## 2023-03-25 PROCEDURE — 83540 ASSAY OF IRON: CPT

## 2023-03-25 PROCEDURE — 92526 ORAL FUNCTION THERAPY: CPT | Mod: GN

## 2023-03-25 PROCEDURE — 86480 TB TEST CELL IMMUN MEASURE: CPT

## 2023-03-25 PROCEDURE — 83605 ASSAY OF LACTIC ACID: CPT

## 2023-03-25 PROCEDURE — U0003: CPT

## 2023-03-25 PROCEDURE — 86900 BLOOD TYPING SEROLOGIC ABO: CPT

## 2023-03-25 PROCEDURE — 93306 TTE W/DOPPLER COMPLETE: CPT

## 2023-03-25 PROCEDURE — 84550 ASSAY OF BLOOD/URIC ACID: CPT

## 2023-03-25 PROCEDURE — 86901 BLOOD TYPING SEROLOGIC RH(D): CPT

## 2023-03-25 PROCEDURE — 82330 ASSAY OF CALCIUM: CPT

## 2023-03-25 PROCEDURE — 85045 AUTOMATED RETICULOCYTE COUNT: CPT

## 2023-03-25 PROCEDURE — 88189 FLOWCYTOMETRY/READ 16 & >: CPT

## 2023-03-25 PROCEDURE — 86664 EPSTEIN-BARR NUCLEAR ANTIGEN: CPT

## 2023-03-25 PROCEDURE — 83550 IRON BINDING TEST: CPT

## 2023-03-25 PROCEDURE — 84145 PROCALCITONIN (PCT): CPT

## 2023-03-25 PROCEDURE — 82728 ASSAY OF FERRITIN: CPT

## 2023-03-25 PROCEDURE — C9113: CPT

## 2023-03-25 PROCEDURE — 86665 EPSTEIN-BARR CAPSID VCA: CPT

## 2023-03-25 PROCEDURE — 86880 COOMBS TEST DIRECT: CPT

## 2023-03-25 PROCEDURE — 85014 HEMATOCRIT: CPT

## 2023-03-25 PROCEDURE — 83010 ASSAY OF HAPTOGLOBIN QUANT: CPT

## 2023-03-25 PROCEDURE — 74174 CTA ABD&PLVS W/CONTRAST: CPT | Mod: 26,MH

## 2023-03-25 PROCEDURE — U0005: CPT

## 2023-03-25 PROCEDURE — 84100 ASSAY OF PHOSPHORUS: CPT

## 2023-03-25 PROCEDURE — 85379 FIBRIN DEGRADATION QUANT: CPT

## 2023-03-25 PROCEDURE — 82803 BLOOD GASES ANY COMBINATION: CPT

## 2023-03-25 PROCEDURE — 80053 COMPREHEN METABOLIC PANEL: CPT

## 2023-03-25 PROCEDURE — 94660 CPAP INITIATION&MGMT: CPT

## 2023-03-25 PROCEDURE — 85652 RBC SED RATE AUTOMATED: CPT

## 2023-03-25 PROCEDURE — 85025 COMPLETE CBC W/AUTO DIFF WBC: CPT

## 2023-03-25 PROCEDURE — 71045 X-RAY EXAM CHEST 1 VIEW: CPT

## 2023-03-25 PROCEDURE — 80061 LIPID PANEL: CPT

## 2023-03-25 PROCEDURE — 88185 FLOWCYTOMETRY/TC ADD-ON: CPT

## 2023-03-25 RX ORDER — POLYETHYLENE GLYCOL 3350 17 G/17G
17 POWDER, FOR SOLUTION ORAL
Qty: 0 | Refills: 0 | DISCHARGE

## 2023-03-25 RX ORDER — LOSARTAN POTASSIUM 100 MG/1
1 TABLET, FILM COATED ORAL
Qty: 0 | Refills: 0 | DISCHARGE

## 2023-03-25 RX ORDER — PIPERACILLIN AND TAZOBACTAM 4; .5 G/20ML; G/20ML
3.38 INJECTION, POWDER, LYOPHILIZED, FOR SOLUTION INTRAVENOUS ONCE
Refills: 0 | Status: DISCONTINUED | OUTPATIENT
Start: 2023-03-25 | End: 2023-03-29

## 2023-03-25 RX ORDER — ACETAMINOPHEN 500 MG
2 TABLET ORAL
Qty: 0 | Refills: 0 | DISCHARGE

## 2023-03-25 RX ORDER — PIPERACILLIN AND TAZOBACTAM 4; .5 G/20ML; G/20ML
3.38 INJECTION, POWDER, LYOPHILIZED, FOR SOLUTION INTRAVENOUS ONCE
Refills: 0 | Status: COMPLETED | OUTPATIENT
Start: 2023-03-25 | End: 2023-03-25

## 2023-03-25 RX ORDER — GABAPENTIN 400 MG/1
1 CAPSULE ORAL
Qty: 0 | Refills: 0 | DISCHARGE

## 2023-03-25 RX ORDER — FERROUS SULFATE 325(65) MG
5 TABLET ORAL
Qty: 0 | Refills: 0 | DISCHARGE

## 2023-03-25 RX ORDER — LANOLIN ALCOHOL/MO/W.PET/CERES
5 CREAM (GRAM) TOPICAL AT BEDTIME
Refills: 0 | Status: DISCONTINUED | OUTPATIENT
Start: 2023-03-25 | End: 2023-03-31

## 2023-03-25 RX ORDER — OXYBUTYNIN CHLORIDE 5 MG
5 TABLET ORAL DAILY
Refills: 0 | Status: DISCONTINUED | OUTPATIENT
Start: 2023-03-25 | End: 2023-03-31

## 2023-03-25 RX ORDER — ACETAMINOPHEN 500 MG
650 TABLET ORAL EVERY 6 HOURS
Refills: 0 | Status: DISCONTINUED | OUTPATIENT
Start: 2023-03-25 | End: 2023-03-31

## 2023-03-25 RX ORDER — LACTULOSE 10 G/15ML
15 SOLUTION ORAL
Qty: 0 | Refills: 0 | DISCHARGE

## 2023-03-25 RX ORDER — FERROUS SULFATE 325(65) MG
325 TABLET ORAL DAILY
Refills: 0 | Status: DISCONTINUED | OUTPATIENT
Start: 2023-03-26 | End: 2023-03-30

## 2023-03-25 RX ORDER — ERYTHROPOIETIN 10000 [IU]/ML
1 INJECTION, SOLUTION INTRAVENOUS; SUBCUTANEOUS
Qty: 0 | Refills: 0 | DISCHARGE

## 2023-03-25 RX ORDER — BRINZOLAMIDE 10 MG/ML
1 SUSPENSION/ DROPS OPHTHALMIC
Qty: 0 | Refills: 0 | DISCHARGE

## 2023-03-25 RX ORDER — PANTOPRAZOLE SODIUM 20 MG/1
40 TABLET, DELAYED RELEASE ORAL
Refills: 0 | Status: DISCONTINUED | OUTPATIENT
Start: 2023-03-25 | End: 2023-03-28

## 2023-03-25 RX ORDER — GABAPENTIN 400 MG/1
100 CAPSULE ORAL THREE TIMES A DAY
Refills: 0 | Status: DISCONTINUED | OUTPATIENT
Start: 2023-03-25 | End: 2023-03-28

## 2023-03-25 RX ORDER — CALCIUM CARBONATE 500(1250)
1 TABLET ORAL DAILY
Refills: 0 | Status: DISCONTINUED | OUTPATIENT
Start: 2023-03-25 | End: 2023-03-30

## 2023-03-25 RX ORDER — ATORVASTATIN CALCIUM 80 MG/1
1 TABLET, FILM COATED ORAL
Qty: 0 | Refills: 0 | DISCHARGE

## 2023-03-25 RX ORDER — POLYETHYLENE GLYCOL 3350 17 G/17G
17 POWDER, FOR SOLUTION ORAL DAILY
Refills: 0 | Status: DISCONTINUED | OUTPATIENT
Start: 2023-03-25 | End: 2023-03-31

## 2023-03-25 RX ORDER — ATORVASTATIN CALCIUM 80 MG/1
80 TABLET, FILM COATED ORAL AT BEDTIME
Refills: 0 | Status: DISCONTINUED | OUTPATIENT
Start: 2023-03-25 | End: 2023-03-29

## 2023-03-25 RX ORDER — SODIUM CHLORIDE 9 MG/ML
250 INJECTION, SOLUTION INTRAVENOUS
Refills: 0 | Status: COMPLETED | OUTPATIENT
Start: 2023-03-25 | End: 2023-03-25

## 2023-03-25 RX ORDER — SODIUM CHLORIDE 9 MG/ML
1000 INJECTION, SOLUTION INTRAVENOUS
Refills: 0 | Status: DISCONTINUED | OUTPATIENT
Start: 2023-03-25 | End: 2023-03-27

## 2023-03-25 RX ORDER — ASCORBIC ACID 60 MG
1 TABLET,CHEWABLE ORAL
Qty: 0 | Refills: 0 | DISCHARGE

## 2023-03-25 RX ORDER — PIPERACILLIN AND TAZOBACTAM 4; .5 G/20ML; G/20ML
3.38 INJECTION, POWDER, LYOPHILIZED, FOR SOLUTION INTRAVENOUS EVERY 8 HOURS
Refills: 0 | Status: DISCONTINUED | OUTPATIENT
Start: 2023-03-26 | End: 2023-03-29

## 2023-03-25 RX ORDER — SENNA PLUS 8.6 MG/1
1 TABLET ORAL
Qty: 0 | Refills: 0 | DISCHARGE

## 2023-03-25 RX ORDER — SENNA PLUS 8.6 MG/1
2 TABLET ORAL AT BEDTIME
Refills: 0 | Status: DISCONTINUED | OUTPATIENT
Start: 2023-03-25 | End: 2023-03-31

## 2023-03-25 RX ADMIN — PIPERACILLIN AND TAZOBACTAM 25 GRAM(S): 4; .5 INJECTION, POWDER, LYOPHILIZED, FOR SOLUTION INTRAVENOUS at 21:58

## 2023-03-25 RX ADMIN — SODIUM CHLORIDE 75 MILLILITER(S): 9 INJECTION, SOLUTION INTRAVENOUS at 20:30

## 2023-03-25 RX ADMIN — SODIUM CHLORIDE 250 MILLILITER(S): 9 INJECTION, SOLUTION INTRAVENOUS at 19:30

## 2023-03-25 NOTE — PATIENT PROFILE ADULT - FALL HARM RISK - HARM RISK INTERVENTIONS

## 2023-03-25 NOTE — H&P ADULT - NSHPLABSRESULTS_GEN_ALL_CORE
CT Angio Abdomen and Pelvis w/ IV Cont (03.25.23 @ 04:41)  1. Inflammatory changes involving CBD within the pancreatic head raising a question of cholangitis. Please correlate with symptoms.  2. No CTA evidence of active gastrointestinal bleed.  3. Otherwise, no evidence of acute abdominal or pelvic pathology.

## 2023-03-25 NOTE — H&P ADULT - ASSESSMENT
89F w/ h/o chronic anemia, HTN, DLD p/w anemia on outpatient labwork. Admitted to telemetry for ACS r/o and anemia.    #Acute on Chronic Macrocytic Anemia  Initially p/w anemia seen on outpatient labs. DELANO positive (however possibly false positive i/s/o PO iron use). Has been to the ED multiple times over past several months for anemia requiring blood transfusions. Previously seen by heme-onc on prior admission, but recommended against further w/u for chronic anemia.  - Trend Hb via daily CBC; transfuse if Hb<7  - Maintain active T&S   - c/w Procrit 40K SQ BIW (Tuesday/Friday)  - c/w Ferrous Sulfate  - start pantoprazole 40mg IV BID  - obtain B12, folate, LDH, haptoglobin, and reticulocyte count  - GI consulted; f/u recs    #ACS r/o  On admission, Tn elevated to 0.03. No reported chest pain. Suspect demand ischemia i/s/o anemia  - admit to telemetry  - repeat EKG in AM  - repeat Tn at 4PM and in AM (first Tn .03)  - check lipid panel, A1c, and TSH  - obtain TTE    #Dyslipidemia  - c/w atorvastatin 80mg PO QHS    #Hypertension  - hold losartan i/s/o soft BP on admission  - cautiously restart home meds as BP improves    DVT PPX: SCD's; no chemoprophylaxis i/s/o suspected GI bleed and thrombocytopenia  GI PPX: pantoprazole 40mg IV BID  DIET: DASH/TLC  ACTIVITY: IAT  CODE STATUS: Full Code  DISPOSITION: From [facility]; likely return to [facility] (f/u PT)    PENDING: heme/onc consult; ACS w/u; PT consult 89F w/ h/o chronic anemia, HTN, DLD p/w anemia on outpatient labwork. Admitted to telemetry for ACS r/o and anemia.    #Acute on Chronic Macrocytic Anemia  Initially p/w anemia seen on outpatient labs. DELANO positive (however possibly false positive i/s/o PO iron use). Has been to the ED multiple times over past several months for anemia requiring blood transfusions. Previously seen by heme-onc on prior admission, but recommended against further w/u for chronic anemia given pt's advanced age.  - Trend Hb via daily CBC; transfuse if Hb<7  - Maintain active T&S   - c/w Procrit 40K SQ BIW (Tuesday/Friday)  - c/w Ferrous Sulfate  - start pantoprazole 40mg IV BID  - obtain B12, folate, LDH, haptoglobin, and reticulocyte count  - GI consulted; f/u recs    #Leukocytosis  #Bicytopenia  WBC 62 on admission (previously WBC 45 two days prior to admission per [facility] paperwork w/ Ferritin >1500). SIRS negative (no fevers, tachycardia, or tachypnea despite leukocytosis). No clear sign of infection on admission, but CTA AP on admission did show inflammatory changes involving CBD, raising concern for cholangitis. However, would expect elevated LFT's if this were the case. Differential includes malignancy (severe leukocytosis w/ bicytopenia, but no blasts on smear) and HLH (high ferritin, but no clear source of infection).   - obtain BCx  - start LR @ 75 cc/hr  - start Zosyn 3.375mg IV Q8H (long infusion protocol) for empiric intra-abdominal coverage  - obtain DIC and TLS panel  - heme-onc consulted; f/u recs    #ACS r/o  On admission, Tn elevated to 0.03. No reported chest pain. Suspect demand ischemia i/s/o anemia  - admit to telemetry  - repeat EKG in AM  - repeat Tn at 4PM and in AM (first Tn .03)  - check lipid panel, A1c, and TSH  - obtain TTE    #Dyslipidemia  - c/w atorvastatin 80mg PO QHS    #Hypertension  - hold losartan i/s/o soft BP on admission  - cautiously restart home meds as BP improves    DVT PPX: SCD's; no chemoprophylaxis i/s/o suspected GI bleed and thrombocytopenia  GI PPX: pantoprazole 40mg IV BID  DIET: DASH/TLC  ACTIVITY: IAT  CODE STATUS: Full Code  DISPOSITION: From [facility]; likely return to [facility] (f/u PT)    PENDING: heme/onc consult; ACS w/u; PT consult

## 2023-03-25 NOTE — H&P ADULT - NSHPPHYSICALEXAM_GEN_ALL_CORE
VITALS  T(C): 36.1 (03-25-23 @ 11:30), Max: 36.7 (03-24-23 @ 20:51)  HR: 77 (03-25-23 @ 13:00) (70 - 80)  BP: 122/66 (03-25-23 @ 13:00) (100/50 - 122/66)  RR: 18 (03-25-23 @ 11:30) (17 - 18)  SpO2: 100% (03-25-23 @ 13:00) (99% - 100%)    PHYSICAL EXAM  GENERAL: NAD, well-developed  NEURO: AO x3, PERRLA, EOMI, motor strength intact in 4/4 extremities, sensation intact  HEAD:  Atraumatic, Normocephalic  EYES: conjunctiva and sclera clear  NECK: Supple, No JVD, no lymphadenopathy, no thyromegaly  CHEST/LUNG: Clear to auscultation bilaterally; No wheezes, rales or rhonchi  HEART: Regular rate and rhythm; No murmurs, rubs, or gallops  ABDOMEN: Soft, Nontender, Nondistended; Bowel sounds present, no masses.  EXTREMITIES:  2+ Peripheral Pulses, No clubbing, cyanosis, or edema  SKIN: Warm, dry, intact, no rashes or lesions  PSYCH: affect appropriate

## 2023-03-25 NOTE — H&P ADULT - HISTORY OF PRESENT ILLNESS
89F w/ h/o chronic anemia, HTN, DLD p/w anemia. Pt is from [facility]. Had outpatient labwork, which showed WBC 44.8, Hb 6.3, PLT 35, and Ferritin >1500. No reported fevers, chills, CP, palpitations, SOB, abdominal pain, n/v/d, dysuria, or LE swelling.    In ED, pt HD stable on vitals. DELANO positive for melena. Labs significant for WBC 62.16, Hb 6.9, PLT 35, BNP 2164, Tn 0.03, and Lactate 0.9. CTA AP demonstrated no evidence of active GI bleed, but did show inflammatory changes involving CBD within the pancreatic head raising a   question of cholangitis. S/P 1u pRBC, pantoprazole 40mg IV x1, and Lasix 20mg IV x1. Subsequently admitted to telemetry for ACS r/o and anemia.

## 2023-03-25 NOTE — PROVIDER CONTACT NOTE (OTHER) - BACKGROUND
Pt came from the ED, The ED nurse did not know the reason the pt was on tele. No H&P was done on the patient, not sure of patients history so we did not know if this afib was a new finding or not.

## 2023-03-25 NOTE — CONSULT NOTE ADULT - SUBJECTIVE AND OBJECTIVE BOX
Gastroenterology Consultation:    Patient is a 89y old  Female who presents with a chief complaint of     Admitted on: 03-25-23  HPI:  89 female history of hyperlipidemia, hypertension, chronic anemia presenting from [place] for low hemoglobin.  States that her hemoglobin is usually low and she is unsure of the source.  Denies any hematuria or rectal bleeding. Gi was consulted for anemia and possible dark stools.   Denies any abdominal pain, nausea, vomiting , hematemesis ,hematochezia, weight loss.     Prior EGD: none on chart   Prior Colonoscopy: none on chart       PAST MEDICAL & SURGICAL HISTORY:  Chronic anemia      HTN (hypertension)      HLD (hyperlipidemia)          FAMILY HISTORY:  No pertinent family history in first degree relatives        Social History:  Tobacco: N  Alcohol: N  Drugs: N    Home Medications:  acetaminophen 325 mg oral tablet: 2 tab(s) orally every 4 hours, As Needed (09 Oct 2022 01:50)  aspirin 81 mg oral tablet, chewable: 1 tab(s) orally once a day (09 Oct 2022 01:50)  brinzolamide 1% ophthalmic suspension: 1 drop(s) to each affected eye 3 times a day (09 Oct 2022 01:50)  calcium carbonate 500 mg (200 mg elemental calcium) oral tablet, chewable: 1 tab(s) orally once a day (09 Oct 2022 01:50)  ferrous sulfate 220 mg/5 mL (44 mg/5 mL elemental iron) oral elixir: 5 milliliter(s) orally once a day (09 Oct 2022 01:50)  folic acid 1 mg oral tablet: 1 tab(s) orally once a day (09 Oct 2022 01:50)  gabapentin 100 mg oral capsule: 1 cap(s) orally 3 times a day (09 Oct 2022 01:50)  glucosamine hydrochloride 1500 mg oral tablet: 1 tab(s) orally once a day (09 Oct 2022 01:50)  lactulose 10 g/15 mL oral syrup: 15 milliliter(s) orally once a day (09 Oct 2022 01:50)  Lipitor 80 mg oral tablet: 1 tab(s) orally once a day (09 Oct 2022 01:50)  losartan 50 mg oral tablet: 1 tab(s) orally once a day (09 Oct 2022 01:50)  melatonin 5 mg oral tablet: 1 tab(s) orally once a day (at bedtime) (09 Oct 2022 01:50)  MiraLax oral powder for reconstitution: 17 gram(s) orally once a day (09 Oct 2022 01:50)  oxybutynin 5 mg/24 hours oral tablet, extended release: 1 tab(s) orally once a day (09 Oct 2022 01:50)  Procrit 40,000 units/mL preservative-free injectable solution: 1 milliliter(s) injectable 2 times a week  Tuesday and Friday. (09 Oct 2022 01:50)  Refresh ophthalmic solution: 1 drop(s) to each affected eye 4 times a day (09 Oct 2022 01:50)  Senna 8.6 mg oral tablet: 1 tab(s) orally once a day (at bedtime) (09 Oct 2022 01:50)  Vitamin C 100 mg oral tablet: 1 tab(s) orally once a day (09 Oct 2022 01:50)    MEDICATIONS  (STANDING):    MEDICATIONS  (PRN):      Allergies  sulfa drugs (Other)      Review of Systems:   Constitutional:  No Fever, No Chills  ENT/Mouth:  No Hearing Changes,  No Difficulty Swallowing  Eyes:  No Eye Pain, No Vision Changes  Cardiovascular:  No Chest Pain, No Palpitations  Respiratory:  No Cough, No Dyspnea  Gastrointestinal:  As described in HPI  Musculoskeletal:  No Joint Swelling, No Back Pain  Skin:  No Skin Lesions, No Jaundice  Neuro:  No Syncope, No Dizziness  Heme/Lymph:  No Bruising, No Bleeding.          Physical Examination:  T(C): 36.1 (03-25-23 @ 11:30), Max: 36.7 (03-24-23 @ 20:51)  HR: 77 (03-25-23 @ 13:00) (70 - 80)  BP: 122/66 (03-25-23 @ 13:00) (100/50 - 122/66)  RR: 18 (03-25-23 @ 11:30) (17 - 18)  SpO2: 100% (03-25-23 @ 13:00) (99% - 100%)  Height (cm): 154.9 (03-24-23 @ 20:51)  Weight (kg): 54.4 (03-24-23 @ 20:51)      Constitutional: No acute distress.  Eyes:. Conjunctivae are clear, Sclera is non-icteric.  Ears Nose and Throat: The external ears are normal appearing,  Oral mucosa is pink and moist.  Respiratory:  No signs of respiratory distress. Lung sounds are clear bilaterally.  Cardiovascular:  S1 S2, Regular rate and rhythm.  GI: Abdomen is soft, symmetric, and non-tender without distention.           Data:                        6.9    62.16 )-----------( 35       ( 25 Mar 2023 00:30 )             23.5     Hgb Trend:  6.9  03-25-23 @ 00:30      03-25    137  |  102  |  24<H>  ----------------------------<  76  3.7   |  24  |  0.9    Ca    8.0<L>      25 Mar 2023 00:30  Mg     1.4     03-25    TPro  6.2  /  Alb  2.8<L>  /  TBili  1.0  /  DBili  x   /  AST  35  /  ALT  24  /  AlkPhos  72  03-25    Liver panel trend:  TBili 1.0   /   AST 35   /   ALT 24   /   AlkP 72   /   Tptn 6.2   /   Alb 2.8    /   DBili --      03-25      PT/INR - ( 25 Mar 2023 00:30 )   PT: 15.10 sec;   INR: 1.31 ratio         PTT - ( 25 Mar 2023 00:30 )  PTT:31.5 sec        Radiology:  CT Angio Abdomen and Pelvis w/ IV Cont:   ACC: [id number] EXAM:  CT ANGIO ABD PELV (W)AW IC   ORDERED BY: RUTH SOUZA     PROCEDURE DATE:  03/25/2023          INTERPRETATION:  CLINICAL HISTORY/REASON FOR EXAM: Melena.    TECHNIQUE: Contiguous axial CT images of the abdomen and pelvis were   obtained before and after the administration of 100 cc Omnipaque 350   intravenous contrast. Oral contrast was not administered. Reformatted   images in the coronal and sagittal planes were acquired.    COMPARISON CT: None      FINDINGS:  LOWERCHEST: Redemonstration of bibasilar consolidative opacities. Trace   bilateral pleural effusions.    HEPATOBILIARY: Post cholecystectomy. Redemonstration of pneumobilia.  Inflammatory changes involving CBD within the pancreatic head raising a   question of cholangitis.    SPLEEN: Unremarkable.    PANCREAS: Unremarkable.    ADRENAL GLANDS: Nodular thickening of the left adrenal, unchanged    KIDNEYS: Symmetric pattern of renal enhancement. No evidence of a mass,   hydronephrosis or hydroureter. Bilateral punctate nonobstructive renal   calculi. Bilateral subcentimeter hypodensities too small to characterize.    ABDOMINOPELVIC NODES: No enlarged abdominal or pelvic lymph nodes.    PELVIC ORGANS: The urinary bladder is distended with fluid.    PERITONEUM/MESENTERY/BOWEL: Large hiatus hernia. No evidence of   gastrointestinal arterial extravasation or venous filling to suggest   active intestinal bleed. No bowel obstruction, ascites or intraperitoneal   free air. Normal caliber appendix. Predominantly sigmoid diverticula   disease    BONES/SOFT TISSUES: Osteopenia. Degenerative changes of the spine.   Chronic severe T12 compression deformity. Presacral edema is noted.    VASCULAR:  Calcified and noncalcified atherosclerotic disease of the  aorta.      IMPRESSION:      Inflammatory changes involving CBD within the pancreatic head raising a   question of cholangitis. Please correlate with symptoms.    No CTA evidence of active gastrointestinal bleed.    Otherwise, no evidence of acute abdominal or pelvic pathology.    --- End of Report ---          PAULETTE VILLATORO MD; Resident Radiologist  This document has been electronically signed.  ROSANGELA DALE MD; Attending Radiologist  This document has been electronically signed. Mar 25 2023  5:46AM (03-25-23 @ 04:41)

## 2023-03-25 NOTE — H&P ADULT - ATTENDING COMMENTS
89F w/ h/o chronic anemia, HTN, DLD p/w anemia on outpatient labwork. Admitted to telemetry for ACS r/o and anemia.    1. Acute on Chronic Macrocytic Anemia s/p 1 PRBC  * Initially p/w anemia seen on outpatient labs. DELANO positive (however possibly false positive i/s/o PO iron use). Has been to the ED multiple times over past several months for anemia requiring blood transfusions. Previously seen by heme-onc on prior admission, but recommended against further w/u for chronic anemia given pt's advanced age.  - Admit to medicine           - Type and screen   - Trend Hb via daily CBC; transfuse if Hb<7  - c/w Procrit 40K SQ BIW (Tuesday/Friday)  - start pantoprazole 40mg IV BID  - obtain B12, folate, LDH, haptoglobin, and reticulocyte count  - GI consult:pending    1. Leukocytosis associated with Bicytopenia. Less likely cholangitis (as pt has no abdominal pain, no fever, no N/V...)   * WBC 62 on admission (previously WBC 45 two days prior to admission per [facility] paperwork w/ Ferritin >1500).   * SIRS negative (no fevers, tachycardia, or tachypnea despite leukocytosis). No clear sign of infection on admission,   - Start Zosyn  - Blood cx:pending  - Cont LR at 75 ml/hr  - DIC labs:pending               - TLS:pending  -  CTA AP:   a) inflammatory changes involving CBD, raising concern for cholangitis. However, would expect elevated LFT's if this were the case.   - Oncology consult:pending  * Discuss with hematology. Ask to send labs for DIC and TLS. Hematology will review smear tmr    3. Detectable trop likely demand ischemia due to anemia   On admission, Tn elevated to 0.03. No reported chest pain. Suspect demand ischemia i/s/o anemia  - Telemetry      -ECG: reported wnl   - repeat Tn at 4PM and in AM (first Tn .03) (consider stress test as outpatient)   - check lipid panel, A1c, and TSH  - obtain TTE    4.Dyslipidemia  - c/w atorvastatin 80mg PO QHS    5. Hypertension  - hold losartan i/s/o soft BP on admission  - cautiously restart home meds as BP improves    DVT PPX: SCD's; no chemoprophylaxis i/s/o suspected GI bleed and thrombocytopenia  GI PPX: pantoprazole 40mg IV BID  DIET: DASH/TLC  ACTIVITY: IAT  CODE STATUS: Full Code  DISPOSITION: From [facility]; likely return to [facility] (f/u PT)

## 2023-03-25 NOTE — CONSULT NOTE ADULT - ASSESSMENT
89 female history of hyperlipidemia, hypertension, chronic anemia presenting from [place] for low hemoglobin.  States that her hemoglobin is usually low and she is unsure of the source.  Denies any hematuria or rectal bleeding. Gi was consulted for anemia and possible dark stools.   Denies any abdominal pain, nausea, vomiting , hematemesis ,hematochezia, weight loss.     #)Chronic anemia/thrombocytopenia  #)Elevated WBC count   -Hemodynamically stable, no fever, no tachy cardia   -LFT stable no evidence of cholangitis   -DELANO dark green stools   -h/o repeated blood transfusions in the past   -Lipase 17   -CT abdomen showed Post cholecystectomy. Redemonstration of pneumobilia. Inflammatory changes involving CBD within the pancreatic head raising a question of cholangitis.    Recs:   No evidence of GI bleed  No evidence of cholangitis (normal LFT, no pain, no fever)  Trend CBC Keep Hb>8 Transfuse as needed  repeat WBC count   Sepsis work up   goals of care discussion   IV abx, IV fluids   liquid diet       89 female history of hyperlipidemia, hypertension, chronic anemia presenting from [place] for low hemoglobin.  States that her hemoglobin is usually low and she is unsure of the source.  Denies any hematuria or rectal bleeding. Gi was consulted for anemia and possible dark stools.   Denies any abdominal pain, nausea, vomiting , hematemesis ,hematochezia, weight loss.     #)Chronic anemia/thrombocytopenia  #)Elevated WBC count   -Hemodynamically stable, no fever, no tachy cardia   -LFT stable no evidence of cholangitis   -DELANO dark green stools (On Iron PO at [facility])  -h/o repeated blood transfusions in the past   -Lipase 17   -CT abdomen showed Post cholecystectomy. Redemonstration of pneumobilia. Inflammatory changes involving CBD within the pancreatic head raising a question of cholangitis.    Recs:   No evidence of GI bleed (r/o any other causes for low Hb)  No evidence of cholangitis (normal LFT, no pain, no fever)  Trend CBC Keep Hb>8 Transfuse as needed  repeat WBC count   Sepsis work up   goals of care discussion   IV abx, IV fluids   liquid diet

## 2023-03-26 LAB
ALBUMIN SERPL ELPH-MCNC: 2.5 G/DL — LOW (ref 3.5–5.2)
ALBUMIN SERPL ELPH-MCNC: 2.6 G/DL — LOW (ref 3.5–5.2)
ALP SERPL-CCNC: 67 U/L — SIGNIFICANT CHANGE UP (ref 30–115)
ALP SERPL-CCNC: 69 U/L — SIGNIFICANT CHANGE UP (ref 30–115)
ALT FLD-CCNC: 24 U/L — SIGNIFICANT CHANGE UP (ref 0–41)
ALT FLD-CCNC: 25 U/L — SIGNIFICANT CHANGE UP (ref 0–41)
ANION GAP SERPL CALC-SCNC: 14 MMOL/L — SIGNIFICANT CHANGE UP (ref 7–14)
ANION GAP SERPL CALC-SCNC: 15 MMOL/L — HIGH (ref 7–14)
APTT BLD: 30 SEC — SIGNIFICANT CHANGE UP (ref 27–39.2)
AST SERPL-CCNC: 38 U/L — SIGNIFICANT CHANGE UP (ref 0–41)
AST SERPL-CCNC: 40 U/L — SIGNIFICANT CHANGE UP (ref 0–41)
BASOPHILS # BLD AUTO: 0.31 K/UL — HIGH (ref 0–0.2)
BASOPHILS NFR BLD AUTO: 0.7 % — SIGNIFICANT CHANGE UP (ref 0–1)
BILIRUB SERPL-MCNC: 0.6 MG/DL — SIGNIFICANT CHANGE UP (ref 0.2–1.2)
BILIRUB SERPL-MCNC: 0.8 MG/DL — SIGNIFICANT CHANGE UP (ref 0.2–1.2)
BUN SERPL-MCNC: 27 MG/DL — HIGH (ref 10–20)
BUN SERPL-MCNC: 29 MG/DL — HIGH (ref 10–20)
CALCIUM SERPL-MCNC: 7.7 MG/DL — LOW (ref 8.4–10.5)
CALCIUM SERPL-MCNC: 8 MG/DL — LOW (ref 8.4–10.5)
CHLORIDE SERPL-SCNC: 100 MMOL/L — SIGNIFICANT CHANGE UP (ref 98–110)
CHLORIDE SERPL-SCNC: 103 MMOL/L — SIGNIFICANT CHANGE UP (ref 98–110)
CHOLEST SERPL-MCNC: 66 MG/DL — SIGNIFICANT CHANGE UP
CO2 SERPL-SCNC: 18 MMOL/L — SIGNIFICANT CHANGE UP (ref 17–32)
CO2 SERPL-SCNC: 20 MMOL/L — SIGNIFICANT CHANGE UP (ref 17–32)
CREAT SERPL-MCNC: 1.3 MG/DL — SIGNIFICANT CHANGE UP (ref 0.7–1.5)
CREAT SERPL-MCNC: 1.3 MG/DL — SIGNIFICANT CHANGE UP (ref 0.7–1.5)
D DIMER BLD IA.RAPID-MCNC: 757 NG/ML DDU — HIGH
EGFR: 37 ML/MIN/1.73M2 — LOW
EGFR: 37 ML/MIN/1.73M2 — LOW
EOSINOPHIL # BLD AUTO: 0.48 K/UL — SIGNIFICANT CHANGE UP (ref 0–0.7)
EOSINOPHIL NFR BLD AUTO: 1 % — SIGNIFICANT CHANGE UP (ref 0–8)
FIBRINOGEN PPP-MCNC: 439 MG/DL — SIGNIFICANT CHANGE UP (ref 204.4–570.6)
GLUCOSE SERPL-MCNC: 114 MG/DL — HIGH (ref 70–99)
GLUCOSE SERPL-MCNC: 120 MG/DL — HIGH (ref 70–99)
HCT VFR BLD CALC: 25.5 % — LOW (ref 37–47)
HCT VFR BLD CALC: 27.4 % — LOW (ref 37–47)
HDLC SERPL-MCNC: 26 MG/DL — LOW
HGB BLD-MCNC: 7.8 G/DL — LOW (ref 12–16)
HGB BLD-MCNC: 8.2 G/DL — LOW (ref 12–16)
IMM GRANULOCYTES NFR BLD AUTO: 3.2 % — HIGH (ref 0.1–0.3)
INR BLD: 1.2 RATIO — SIGNIFICANT CHANGE UP (ref 0.65–1.3)
LDH SERPL L TO P-CCNC: 470 — HIGH (ref 50–242)
LIPID PNL WITH DIRECT LDL SERPL: 26 MG/DL — SIGNIFICANT CHANGE UP
LYMPHOCYTES # BLD AUTO: 15 % — LOW (ref 20.5–51.1)
LYMPHOCYTES # BLD AUTO: 7.01 K/UL — HIGH (ref 1.2–3.4)
MAGNESIUM SERPL-MCNC: 1.4 MG/DL — LOW (ref 1.8–2.4)
MAGNESIUM SERPL-MCNC: 1.6 MG/DL — LOW (ref 1.8–2.4)
MCHC RBC-ENTMCNC: 29.9 G/DL — LOW (ref 32–37)
MCHC RBC-ENTMCNC: 30 PG — SIGNIFICANT CHANGE UP (ref 27–31)
MCHC RBC-ENTMCNC: 30.6 G/DL — LOW (ref 32–37)
MCHC RBC-ENTMCNC: 30.8 PG — SIGNIFICANT CHANGE UP (ref 27–31)
MCV RBC AUTO: 103 FL — HIGH (ref 81–99)
MCV RBC AUTO: 98.1 FL — SIGNIFICANT CHANGE UP (ref 81–99)
MONOCYTES # BLD AUTO: 25.7 K/UL — HIGH (ref 0.1–0.6)
MONOCYTES NFR BLD AUTO: 54.9 % — HIGH (ref 1.7–9.3)
NEUTROPHILS # BLD AUTO: 11.8 K/UL — HIGH (ref 1.4–6.5)
NEUTROPHILS NFR BLD AUTO: 25.2 % — LOW (ref 42.2–75.2)
NON HDL CHOLESTEROL: 40 MG/DL — SIGNIFICANT CHANGE UP
NRBC # BLD: 5 /100 WBCS — HIGH (ref 0–0)
NRBC # BLD: 6 /100 WBCS — HIGH (ref 0–0)
PHOSPHATE SERPL-MCNC: 4.1 MG/DL — SIGNIFICANT CHANGE UP (ref 2.1–4.9)
PHOSPHATE SERPL-MCNC: 4.8 MG/DL — SIGNIFICANT CHANGE UP (ref 2.1–4.9)
PLATELET # BLD AUTO: 25 K/UL — LOW (ref 130–400)
PLATELET # BLD AUTO: 36 K/UL — LOW (ref 130–400)
POTASSIUM SERPL-MCNC: 3.9 MMOL/L — SIGNIFICANT CHANGE UP (ref 3.5–5)
POTASSIUM SERPL-MCNC: 4 MMOL/L — SIGNIFICANT CHANGE UP (ref 3.5–5)
POTASSIUM SERPL-SCNC: 3.9 MMOL/L — SIGNIFICANT CHANGE UP (ref 3.5–5)
POTASSIUM SERPL-SCNC: 4 MMOL/L — SIGNIFICANT CHANGE UP (ref 3.5–5)
PROT SERPL-MCNC: 5.8 G/DL — LOW (ref 6–8)
PROT SERPL-MCNC: 5.9 G/DL — LOW (ref 6–8)
PROTHROM AB SERPL-ACNC: 13.8 SEC — HIGH (ref 9.95–12.87)
RBC # BLD: 2.6 M/UL — LOW (ref 4.2–5.4)
RBC # BLD: 2.66 M/UL — LOW (ref 4.2–5.4)
RBC # FLD: 24.5 % — HIGH (ref 11.5–14.5)
RBC # FLD: 25.3 % — HIGH (ref 11.5–14.5)
RETICS #: 59.9 K/UL — SIGNIFICANT CHANGE UP (ref 25–125)
RETICS/RBC NFR: 2.3 % — HIGH (ref 0.5–1.5)
SODIUM SERPL-SCNC: 133 MMOL/L — LOW (ref 135–146)
SODIUM SERPL-SCNC: 137 MMOL/L — SIGNIFICANT CHANGE UP (ref 135–146)
TRIGL SERPL-MCNC: 73 MG/DL — SIGNIFICANT CHANGE UP
TROPONIN T SERPL-MCNC: 0.02 NG/ML — HIGH
URATE SERPL-MCNC: 8.9 MG/DL — HIGH (ref 2.5–7)
WBC # BLD: 44.15 K/UL — CRITICAL HIGH (ref 4.8–10.8)
WBC # BLD: 46.78 K/UL — CRITICAL HIGH (ref 4.8–10.8)
WBC # FLD AUTO: 44.15 K/UL — CRITICAL HIGH (ref 4.8–10.8)
WBC # FLD AUTO: 46.78 K/UL — CRITICAL HIGH (ref 4.8–10.8)

## 2023-03-26 PROCEDURE — 93010 ELECTROCARDIOGRAM REPORT: CPT

## 2023-03-26 PROCEDURE — 99233 SBSQ HOSP IP/OBS HIGH 50: CPT

## 2023-03-26 PROCEDURE — 99497 ADVNCD CARE PLAN 30 MIN: CPT

## 2023-03-26 PROCEDURE — 99223 1ST HOSP IP/OBS HIGH 75: CPT

## 2023-03-26 RX ORDER — MAGNESIUM SULFATE 500 MG/ML
2 VIAL (ML) INJECTION ONCE
Refills: 0 | Status: DISCONTINUED | OUTPATIENT
Start: 2023-03-26 | End: 2023-03-27

## 2023-03-26 RX ADMIN — SODIUM CHLORIDE 75 MILLILITER(S): 9 INJECTION, SOLUTION INTRAVENOUS at 10:55

## 2023-03-26 RX ADMIN — PANTOPRAZOLE SODIUM 40 MILLIGRAM(S): 20 TABLET, DELAYED RELEASE ORAL at 05:37

## 2023-03-26 RX ADMIN — Medication 325 MILLIGRAM(S): at 12:05

## 2023-03-26 RX ADMIN — PIPERACILLIN AND TAZOBACTAM 25 GRAM(S): 4; .5 INJECTION, POWDER, LYOPHILIZED, FOR SOLUTION INTRAVENOUS at 13:12

## 2023-03-26 RX ADMIN — Medication 1 TABLET(S): at 12:06

## 2023-03-26 RX ADMIN — POLYETHYLENE GLYCOL 3350 17 GRAM(S): 17 POWDER, FOR SOLUTION ORAL at 12:06

## 2023-03-26 RX ADMIN — GABAPENTIN 100 MILLIGRAM(S): 400 CAPSULE ORAL at 21:14

## 2023-03-26 RX ADMIN — PANTOPRAZOLE SODIUM 40 MILLIGRAM(S): 20 TABLET, DELAYED RELEASE ORAL at 18:13

## 2023-03-26 RX ADMIN — Medication 5 MILLIGRAM(S): at 21:14

## 2023-03-26 RX ADMIN — Medication 5 MILLIGRAM(S): at 12:06

## 2023-03-26 RX ADMIN — SODIUM CHLORIDE 75 MILLILITER(S): 9 INJECTION, SOLUTION INTRAVENOUS at 18:14

## 2023-03-26 RX ADMIN — GABAPENTIN 100 MILLIGRAM(S): 400 CAPSULE ORAL at 13:12

## 2023-03-26 RX ADMIN — PIPERACILLIN AND TAZOBACTAM 25 GRAM(S): 4; .5 INJECTION, POWDER, LYOPHILIZED, FOR SOLUTION INTRAVENOUS at 21:17

## 2023-03-26 RX ADMIN — SENNA PLUS 2 TABLET(S): 8.6 TABLET ORAL at 21:14

## 2023-03-26 RX ADMIN — ATORVASTATIN CALCIUM 80 MILLIGRAM(S): 80 TABLET, FILM COATED ORAL at 21:14

## 2023-03-26 RX ADMIN — GABAPENTIN 100 MILLIGRAM(S): 400 CAPSULE ORAL at 05:37

## 2023-03-26 RX ADMIN — PIPERACILLIN AND TAZOBACTAM 25 GRAM(S): 4; .5 INJECTION, POWDER, LYOPHILIZED, FOR SOLUTION INTRAVENOUS at 05:37

## 2023-03-26 NOTE — PROGRESS NOTE ADULT - ASSESSMENT
89F w/ h/o chronic anemia, HTN, DLD p/w anemia on outpatient labwork. Admitted to telemetry for ACS r/o and anemia.    #Acute on Chronic Macrocytic Anemia  Initially p/w anemia seen on outpatient labs. DELANO positive (however possibly false positive i/s/o PO iron use). Has been to the ED multiple times over past several months for anemia requiring blood transfusions. Previously seen by heme-onc on prior admission, but recommended against further w/u for chronic anemia given pt's advanced age.  - Trend Hb via daily CBC; transfuse if Hb<7  - Maintain active T&S   - c/w Procrit 40K SQ BIW (Tuesday/Friday)  - c/w Ferrous Sulfate  - start pantoprazole 40mg IV BID  - obtain B12, folate, LDH, haptoglobin, and reticulocyte count  - GI consulted; f/u recs    #Leukocytosis  #Bicytopenia  WBC 62 on admission (previously WBC 45 two days prior to admission per [facility] paperwork w/ Ferritin >1500). SIRS negative (no fevers, tachycardia, or tachypnea despite leukocytosis). No clear sign of infection on admission, but CTA AP on admission did show inflammatory changes involving CBD, raising concern for cholangitis. However, would expect elevated LFT's if this were the case. Differential includes malignancy (severe leukocytosis w/ bicytopenia, but no blasts on smear) and HLH (high ferritin, but no clear source of infection).   - obtain BCx  - start LR @ 75 cc/hr  - start Zosyn 3.375mg IV Q8H (long infusion protocol) for empiric intra-abdominal coverage  - obtain DIC and TLS panel  - heme-onc consulted-- hold off bone marrow biopsy for now; send peripheral cytometry to r/o bone marrow diseases    - check PTT/PT/INR, fibrinogen and d-dimer to r/o DIC  - check TLS panel including LDH, uric acid, phosphorous and CMP  - repeat B12 and folate level  - check LDH, haptoglobin and EMILY  - check CBC w/ diff daiyl  - keep active T&S and transfuse to hb > 7 and platelets > 10k   - c/w supportive transfusions and procrit injections.        #ACS r/o  On admission, Tn elevated to 0.03. No reported chest pain. Suspect demand ischemia i/s/o anemia  - admit to telemetry  - repeat EKG in AM  - repeat Tn at 4PM and in AM (first Tn .03)  - check lipid panel, A1c, and TSH  - obtain TTE    #Dyslipidemia  - c/w atorvastatin 80mg PO QHS    #Hypertension  - hold losartan i/s/o soft BP on admission  - cautiously restart home meds as BP improves    DVT PPX: SCD's; no chemoprophylaxis i/s/o suspected GI bleed and thrombocytopenia  GI PPX: pantoprazole 40mg IV BID  DIET: DASH/TLC  ACTIVITY: IAT  CODE STATUS: Full Code  DISPOSITION: From [facility]; likely return to [facility] (f/u PT)    PENDING: heme/onc consult; ACS w/u; PT consult            # Handoff      89F w/ h/o chronic anemia, HTN, DLD p/w anemia on outpatient lab work. Admitted to telemetry for ACS r/o and anemia.    # leucocytosis- with left side shift and monocytosis- r/o related to hematologic malignancy  - no clear s/o infection  - check blood cultures- not collected; repeat order placed  - continue empiric antibiotics- on zosym  - check procal  CXR: 3/25: No radiographic evidence of acute cardiopulmonary disease.  CT angio A/P Inflammatory changes involving CBD within the pancreatic head raising a question of cholangitis. Please correlate with symptoms.  No CTA evidence of active gastrointestinal bleed.  hematology team following: - Peripheral smear: shows many monocytes, atypical lymphocytes, neutrophils and metamyelocytes; did not appreciate any blasts. Acute leukocytosis likely reactive from stress, infection and etc. Differential not showing any blasts.  - r/o bone marrow disorder like acute leukemia, MDS (unlikely)   -  flow cytometry  - repeat TLS panel    #Acute on Chronic Macrocytic Anemia  # thrombocytopenia  - r/o related to bone marrow disease  hg stable; monitor CBC, type and screen  heme onc following: pending blood work;  - Pt follows with hematologist at [facility] with Dr. Ayush Hilario,-obtain records  - Flow cytometry was done previously on 9/22 showed no remarkable abnormality.  - Repeat B12 and folate level- pending results  - Check LDH, haptoglobin and EMILY- pending  - c/w Procrit 40K SQ BIW (Tuesday/Friday)  - c/w Ferrous Sulfate  - pantoprazole 40mg IV BID  GI evaluated:     #Leukocytosis  #Bicytopenia  WBC 62 on admission (previously WBC 45 two days prior to admission per [facility] paperwork w/ Ferritin >1500). SIRS negative (no fevers, tachycardia, or tachypnea despite leukocytosis). No clear sign of infection on admission, but CTA AP on admission did show inflammatory changes involving CBD, raising concern for cholangitis. However, would expect elevated LFT's if this were the case. Differential includes malignancy (severe leukocytosis w/ bicytopenia, but no blasts on smear) and HLH (high ferritin, but no clear source of infection).   - obtain BCx  - start LR @ 75 cc/hr  - start Zosyn 3.375mg IV Q8H (long infusion protocol) for empiric intra-abdominal coverage  - obtain DIC and TLS panel  - heme-onc consulted-- hold off bone marrow biopsy for now; send peripheral cytometry to r/o bone marrow diseases    - check PTT/PT/INR, fibrinogen and d-dimer to r/o DIC  - check TLS panel including LDH, uric acid, phosphorous and CMP  - repeat B12 and folate level  - check LDH, haptoglobin and EMILY  - check CBC w/ diff daiyl  - keep active T&S and transfuse to hb > 7 and platelets > 10k   - c/w supportive transfusions and procrit injections.        #ACS r/o  On admission, Tn elevated to 0.03. No reported chest pain. Suspect demand ischemia i/s/o anemia  - admit to telemetry  - repeat EKG in AM  - repeat Tn at 4PM and in AM (first Tn .03)  - check lipid panel, A1c, and TSH  - obtain TTE    #Dyslipidemia  - c/w atorvastatin 80mg PO QHS    #Hypertension  - hold losartan i/s/o soft BP on admission  - cautiously restart home meds as BP improves    DVT PPX: SCD's; no chemoprophylaxis i/s/o suspected GI bleed and thrombocytopenia  GI PPX: pantoprazole 40mg IV BID  DIET: DASH/TLC  ACTIVITY: IAT  CODE STATUS: Full Code  DISPOSITION: From [facility]; likely return to [facility] (f/u PT)    PENDING: heme/onc consult; ACS w/u; PT consult          Patient seen and examined and service provided on 3/26. Note completed on 3/27     89F w/ h/o chronic anemia, HTN, DLD p/w anemia on outpatient lab work. Admitted to telemetry for ACS r/o and anemia.    # leucocytosis- with left side shift and monocytosis- r/o related to hematologic malignancy  - no clear s/o infection  - check blood cultures- not collected; repeat order placed  - continue empiric antibiotics- on zosym  - check procal  CXR: 3/25: No radiographic evidence of acute cardiopulmonary disease.  CT angio A/P Inflammatory changes involving CBD within the pancreatic head raising a question of cholangitis. Please correlate with symptoms.  No CTA evidence of active gastrointestinal bleed.  hematology team following: - Peripheral smear: shows many monocytes, atypical lymphocytes, neutrophils and metamyelocytes; did not appreciate any blasts. Acute leukocytosis likely reactive from stress, infection and etc. Differential not showing any blasts.  - r/o bone marrow disorder like acute leukemia, MDS (unlikely)   -  flow cytometry  - repeat TLS panel    #Acute on Chronic Macrocytic Anemia  # thrombocytopenia  - r/o related to bone marrow disease  hg stable; monitor CBC, type and screen  heme onc following: pending blood work;  - Pt follows with hematologist at [facility] with Dr. Ayush Hilario,-obtain records  - Flow cytometry was done previously on 9/22 showed no remarkable abnormality.  - Repeat B12 and folate level- pending results  - Check LDH, haptoglobin and EMILY- pending  - c/w Procrit 40K SQ BIW (Tuesday/Friday)  - c/w Ferrous Sulfate  - pantoprazole 40mg IV BID  GI evaluated:     No evidence of GI bleed (r/o any other causes for low Hb)    No evidence of cholangitis (normal LFT, no pain, no fever)    # ACS ruled out  troponin mild stable elevation. No reported chest pain. Suspect demand ischemia i/s/o anemia  - admit to telemetry  - TTE pending    #Dyslipidemia  - c/w atorvastatin 80mg PO QHS    #Hypertension  -  losartan on hold  - monitor bP; restart once BP improved    DVT PPX: SCD  GI px- protonix    PENDING: heme/onc follow up; work up; monitoring cbc          Patient seen and examined and service provided on 3/26. Note completed on 3/27

## 2023-03-26 NOTE — PROGRESS NOTE ADULT - SUBJECTIVE AND OBJECTIVE BOX
HPI  Patient is a 89y old Female who presents with a chief complaint of Anemia (26 Mar 2023 06:52)    Currently admitted to medicine with the primary diagnosis of    Today is hospital day 1d.     INTERVAL HPI / OVERNIGHT EVENTS:  Patient was seen and examined at bedside    Patient Feels better  No new complaints  Denies any complains of chest pain or shortness of breath  Denies any abdominal pain/nausea/vomiting        PAST MEDICAL & SURGICAL HISTORY  Chronic anemia    HTN (hypertension)    Dyslipidemia      ALLERGIES  sulfa drugs (Other)    MEDICATIONS  STANDING MEDICATIONS  atorvastatin 80 milliGRAM(s) Oral at bedtime  calcium carbonate    500 mG (Tums) Chewable 1 Tablet(s) Chew daily  ferrous    sulfate 325 milliGRAM(s) Oral daily  gabapentin 100 milliGRAM(s) Oral three times a day  lactated ringers. 1000 milliLiter(s) IV Continuous <Continuous>  magnesium sulfate  IVPB 2 Gram(s) IV Intermittent once  melatonin 5 milliGRAM(s) Oral at bedtime  multivitamin 1 Tablet(s) Oral daily  oxybutynin 5 milliGRAM(s) Oral daily  pantoprazole  Injectable 40 milliGRAM(s) IV Push two times a day  piperacillin/tazobactam IVPB. 3.375 Gram(s) IV Intermittent once  piperacillin/tazobactam IVPB.. 3.375 Gram(s) IV Intermittent every 8 hours  polyethylene glycol 3350 17 Gram(s) Oral daily  senna 2 Tablet(s) Oral at bedtime    PRN MEDICATIONS  acetaminophen     Tablet .. 650 milliGRAM(s) Oral every 6 hours PRN    VITALS:  T(F): 96.7  HR: 77  BP: 139/59  RR: 18  SpO2: 94%    PHYSICAL EXAM  GEN: no distress, comfortable  PULM: BS heard b/l equal, No wheezing  CVS: S1S2 present, no rubs or gallops  ABD: Soft, non-distended, no guarding; non-tender  EXT: No lower extremity edema  NEURO: A&Ox3, moving all extremities    LABS                        7.8    46.78 )-----------( 25       ( 26 Mar 2023 07:40 )             25.5     03-26    133<L>  |  100  |  27<H>  ----------------------------<  114<H>  3.9   |  18  |  1.3    Ca    7.7<L>      26 Mar 2023 07:40  Phos  4.1     03-26  Mg     1.4     03-26    TPro  5.9<L>  /  Alb  2.6<L>  /  TBili  0.8  /  DBili  x   /  AST  40  /  ALT  25  /  AlkPhos  67  03-26    PT/INR - ( 25 Mar 2023 23:00 )   PT: 13.80 sec;   INR: 1.20 ratio         PTT - ( 25 Mar 2023 23:00 )  PTT:30.0 sec      Troponin T, Serum: 0.02 ng/mL *H* (03-26-23 @ 07:40)      CARDIAC MARKERS ( 26 Mar 2023 07:40 )  x     / 0.02 ng/mL / x     / x     / x      CARDIAC MARKERS ( 25 Mar 2023 00:30 )  x     / 0.03 ng/mL / x     / x     / x          RADIOLOGY     HPI  Patient is a 89y old Female who presents with a chief complaint of Anemia (26 Mar 2023 06:52)    Currently admitted to medicine with the primary diagnosis of    Today is hospital day 1d.     INTERVAL HPI / OVERNIGHT EVENTS:  Patient was seen and examined. Patient sitting at bedside  feels weak. states a lot of blood work was done.  She wants to go back to her place  Denies any abdominal pain/nausea/vomiting        PAST MEDICAL & SURGICAL HISTORY  Chronic anemia    HTN (hypertension)    Dyslipidemia      ALLERGIES  sulfa drugs (Other)    MEDICATIONS  STANDING MEDICATIONS  atorvastatin 80 milliGRAM(s) Oral at bedtime  calcium carbonate    500 mG (Tums) Chewable 1 Tablet(s) Chew daily  ferrous    sulfate 325 milliGRAM(s) Oral daily  gabapentin 100 milliGRAM(s) Oral three times a day  lactated ringers. 1000 milliLiter(s) IV Continuous <Continuous>  magnesium sulfate  IVPB 2 Gram(s) IV Intermittent once  melatonin 5 milliGRAM(s) Oral at bedtime  multivitamin 1 Tablet(s) Oral daily  oxybutynin 5 milliGRAM(s) Oral daily  pantoprazole  Injectable 40 milliGRAM(s) IV Push two times a day  piperacillin/tazobactam IVPB. 3.375 Gram(s) IV Intermittent once  piperacillin/tazobactam IVPB.. 3.375 Gram(s) IV Intermittent every 8 hours  polyethylene glycol 3350 17 Gram(s) Oral daily  senna 2 Tablet(s) Oral at bedtime    PRN MEDICATIONS  acetaminophen     Tablet .. 650 milliGRAM(s) Oral every 6 hours PRN    VITALS:  T(F): 96.7  HR: 77  BP: 139/59  RR: 18  SpO2: 94%    PHYSICAL EXAM  GEN: no distress, comfortable, hard of hearing  PULM: BS heard b/l equal, No wheezing  CVS: S1S2 present, no rubs or gallops, systolic murmur present  ABD: Soft, non-distended, no guarding; non-tender  EXT: No lower extremity edema  NEURO: A&Ox3, moving all extremities    LABS                        7.8    46.78 )-----------( 25       ( 26 Mar 2023 07:40 )             25.5     03-26    133<L>  |  100  |  27<H>  ----------------------------<  114<H>  3.9   |  18  |  1.3    Ca    7.7<L>      26 Mar 2023 07:40  Phos  4.1     03-26  Mg     1.4     03-26    TPro  5.9<L>  /  Alb  2.6<L>  /  TBili  0.8  /  DBili  x   /  AST  40  /  ALT  25  /  AlkPhos  67  03-26    PT/INR - ( 25 Mar 2023 23:00 )   PT: 13.80 sec;   INR: 1.20 ratio         PTT - ( 25 Mar 2023 23:00 )  PTT:30.0 sec      Troponin T, Serum: 0.02 ng/mL *H* (03-26-23 @ 07:40)      CARDIAC MARKERS ( 26 Mar 2023 07:40 )  x     / 0.02 ng/mL / x     / x     / x      CARDIAC MARKERS ( 25 Mar 2023 00:30 )  x     / 0.03 ng/mL / x     / x     / x          RADIOLOGY

## 2023-03-26 NOTE — PHYSICAL THERAPY INITIAL EVALUATION ADULT - PERTINENT HX OF CURRENT PROBLEM, REHAB EVAL
89F w/ h/o chronic anemia, HTN, DLD p/w anemia. Pt is from [facility]. Had outpatient labwork, which showed WBC 44.8, Hb 6.3, PLT 35, and Ferritin >1500. No reported fevers, chills, CP, palpitations, SOB, abdominal pain, n/v/d, dysuria, or LE swelling.

## 2023-03-26 NOTE — PROGRESS NOTE ADULT - CONVERSATION DETAILS
FULL code goals of care discussed. possible diagnosis discussed. Current work up plan discussed. Resuscitation measures discussed with patient.   Patient wants to be FULL code.

## 2023-03-26 NOTE — PHYSICAL THERAPY INITIAL EVALUATION ADULT - GENERAL OBSERVATIONS, REHAB EVAL
9:21-10:00. Pt encountered semifowler in NAD, + O2 2 lpm via NC, + IV completed and locked by Romy LONG. Pt is agreeable to PT.

## 2023-03-26 NOTE — CONSULT NOTE ADULT - ATTENDING COMMENTS
Patient also seen and examined by myself. I agree with Dr. Laws's (Hem-Onc fellow) note above. Situation discussed with him and the patient.  All questions answered.

## 2023-03-26 NOTE — PROGRESS NOTE ADULT - SUBJECTIVE AND OBJECTIVE BOX
CHIRAG DENISE 89y Female  MRN#: [id number]   CODE STATUS:________    Hospital Day: 1d    Pt is currently admitted with the primary diagnosis of     SUBJECTIVE  Hospital course   89F w/ h/o chronic anemia, HTN, DLD p/w anemia. Pt is from [facility]. Had outpatient labwork, which showed WBC 44.8, Hb 6.3, PLT 35, and Ferritin >1500. No reported fevers, chills, CP, palpitations, SOB, abdominal pain, n/v/d, dysuria, or LE swelling.    In ED, pt HD stable on vitals. DELANO positive for melena. Labs significant for WBC 62.16, Hb 6.9, PLT 35, BNP 2164, Tn 0.03, and Lactate 0.9. CTA AP demonstrated no evidence of active GI bleed, but did show inflammatory changes involving CBD within the pancreatic head raising a   question of cholangitis. S/P 1u pRBC, pantoprazole 40mg IV x1, and Lasix 20mg IV x1. Subsequently admitted to telemetry for ACS r/o and anemia.    Overnight events: none    Subjective complaints: none    Present Today:   - Rios:  No [  ], Yes [   ] : Indication:     - Type of IV Access:       .. CVC/Piccline:  No [  ], Yes [   ] : Indication:       .. Midline: No [  ], Yes [   ] : Indication:                                             ----------------------------------------------------------  OBJECTIVE  PAST MEDICAL & SURGICAL HISTORY  Chronic anemia    HTN (hypertension)    Dyslipidemia                                              -----------------------------------------------------------  ALLERGIES:  sulfa drugs (Other)                                            ------------------------------------------------------------    HOME MEDICATIONS  Home Medications:  aspirin 81 mg oral tablet, chewable: 1 tab(s) orally once a day (25 Mar 2023 21:36)  atorvastatin 80 mg oral tablet: 1 tab(s) orally once a day (at bedtime) (25 Mar 2023 21:35)  calcium carbonate 500 mg (200 mg elemental calcium) oral tablet, chewable: 1 tab(s) orally once a day (25 Mar 2023 21:36)  cyanocobalamin 1000 mcg oral tablet: 1 tab(s) orally once a day (25 Mar 2023 21:35)  ferrous sulfate 220 mg/5 mL (44 mg/5 mL elemental iron) oral elixir: 7.5 milliliter(s) orally 2 times a day (25 Mar 2023 21:29)  folic acid 1 mg oral tablet: 1 tab(s) orally once a day (25 Mar 2023 21:36)  gabapentin 100 mg oral capsule: 1 cap(s) orally 3 times a day (25 Mar 2023 21:36)  glucosamine hydrochloride 1500 mg oral tablet: 1 tab(s) orally once a day (25 Mar 2023 21:36)  losartan 25 mg oral tablet: 1 tab(s) orally once a day (25 Mar 2023 21:36)  melatonin 5 mg oral tablet: 1 tab(s) orally once a day (at bedtime) (25 Mar 2023 21:36)  MiraLax oral powder for reconstitution: 17 gram(s) orally once a day (25 Mar 2023 21:36)  Multiple Vitamins oral tablet: 1 tab(s) orally once a day (25 Mar 2023 21:36)  oxybutynin 5 mg/24 hours oral tablet, extended release: 1 tab(s) orally once a day (25 Mar 2023 21:36)  senna (sennosides) 8.6 mg oral tablet: 2 tab(s) orally once a day (at bedtime) (25 Mar 2023 21:36)  Vitamin C 500 mg oral tablet, chewable: 1 tab(s) chewed once a day (25 Mar 2023 21:36)                           MEDICATIONS:  STANDING MEDICATIONS  atorvastatin 80 milliGRAM(s) Oral at bedtime  calcium carbonate    500 mG (Tums) Chewable 1 Tablet(s) Chew daily  ferrous    sulfate 325 milliGRAM(s) Oral daily  gabapentin 100 milliGRAM(s) Oral three times a day  lactated ringers. 1000 milliLiter(s) IV Continuous <Continuous>  melatonin 5 milliGRAM(s) Oral at bedtime  multivitamin 1 Tablet(s) Oral daily  oxybutynin 5 milliGRAM(s) Oral daily  pantoprazole  Injectable 40 milliGRAM(s) IV Push two times a day  piperacillin/tazobactam IVPB. 3.375 Gram(s) IV Intermittent once  piperacillin/tazobactam IVPB.. 3.375 Gram(s) IV Intermittent every 8 hours  polyethylene glycol 3350 17 Gram(s) Oral daily  senna 2 Tablet(s) Oral at bedtime    PRN MEDICATIONS  acetaminophen     Tablet .. 650 milliGRAM(s) Oral every 6 hours PRN                                            ------------------------------------------------------------                                               --------------------------------------------------------------  LABS:                        8.2    44.15 )-----------( 36       ( 25 Mar 2023 23:00 )             27.4     03-25    137  |  103  |  29<H>  ----------------------------<  120<H>  4.0   |  20  |  1.3    Ca    8.0<L>      25 Mar 2023 23:00  Phos  4.8     03-25  Mg     1.6     03-25    TPro  5.8<L>  /  Alb  2.5<L>  /  TBili  0.6  /  DBili  x   /  AST  38  /  ALT  24  /  AlkPhos  69  03-25    PT/INR - ( 25 Mar 2023 23:00 )   PT: 13.80 sec;   INR: 1.20 ratio         PTT - ( 25 Mar 2023 23:00 )  PTT:30.0 sec              CARDIAC MARKERS ( 25 Mar 2023 00:30 )  x     / 0.03 ng/mL / x     / x     / x                                                    -------------------------------------------------------------  RADIOLOGY:                                            --------------------------------------------------------------  VITAL SIGNS: Last 24 Hours  T(C): 35.6 (26 Mar 2023 04:53), Max: 36.1 (25 Mar 2023 11:30)  T(F): 96.1 (26 Mar 2023 04:53), Max: 97 (25 Mar 2023 11:30)  HR: 82 (26 Mar 2023 04:53) (67 - 82)  BP: 147/58 (26 Mar 2023 04:53) (113/57 - 147/58)  BP(mean): --  RR: 18 (26 Mar 2023 04:53) (18 - 18)  SpO2: 100% (25 Mar 2023 13:00) (100% - 100%)    PHYSICAL EXAM:  General: Awake, oriented and resting comfortably, no acute distress  Head: normocephalic, atraumatic  ENT:  moist mucous membranes  Cardiac: regular rate and rhythm, normal S1 and S2, no murmurs, rubs or gallops  Respiratory: clear to auscultation bilaterally, no wheezes, crackles or rhonchi, unlabored respirations  Abdomen: normoactive bowel sounds x4, non tender, nondistended  Ext:  No edema,   Neuro: A&O x3, no focal neurological deficits                                                --------------------------------------------------------------    ASSESSMENT & PLAN    Past medical history and hospital course     89F w/ h/o chronic anemia, HTN, DLD p/w anemia on outpatient labwork. Admitted to telemetry for ACS r/o and anemia.    #Acute on Chronic Macrocytic Anemia  Initially p/w anemia seen on outpatient labs. DELANO positive (however possibly false positive i/s/o PO iron use). Has been to the ED multiple times over past several months for anemia requiring blood transfusions. Previously seen by heme-onc on prior admission, but recommended against further w/u for chronic anemia given pt's advanced age.  - Trend Hb via daily CBC; transfuse if Hb<7  - Maintain active T&S   - c/w Procrit 40K SQ BIW (Tuesday/Friday)  - c/w Ferrous Sulfate  - start pantoprazole 40mg IV BID  - obtain B12, folate, LDH, haptoglobin, and reticulocyte count  - GI consulted; f/u recs    #Leukocytosis  #Bicytopenia  WBC 62 on admission (previously WBC 45 two days prior to admission per [facility] paperwork w/ Ferritin >1500). SIRS negative (no fevers, tachycardia, or tachypnea despite leukocytosis). No clear sign of infection on admission, but CTA AP on admission did show inflammatory changes involving CBD, raising concern for cholangitis. However, would expect elevated LFT's if this were the case. Differential includes malignancy (severe leukocytosis w/ bicytopenia, but no blasts on smear) and HLH (high ferritin, but no clear source of infection).   - obtain BCx  - start LR @ 75 cc/hr  - start Zosyn 3.375mg IV Q8H (long infusion protocol) for empiric intra-abdominal coverage  - obtain DIC and TLS panel  - heme-onc consulted-- hold off bone marrow biopsy for now; send peripheral cytometry to r/o bone marrow diseases    - check PTT/PT/INR, fibrinogen and d-dimer to r/o DIC  - check TLS panel including LDH, uric acid, phosphorous and CMP  - repeat B12 and folate level  - check LDH, haptoglobin and EMILY  - check CBC w/ diff daiyl  - keep active T&S and transfuse to hb > 7 and platelets > 10k   - c/w supportive transfusions and procrit injections.        #ACS r/o  On admission, Tn elevated to 0.03. No reported chest pain. Suspect demand ischemia i/s/o anemia  - admit to telemetry  - repeat EKG in AM  - repeat Tn at 4PM and in AM (first Tn .03)  - check lipid panel, A1c, and TSH  - obtain TTE    #Dyslipidemia  - c/w atorvastatin 80mg PO QHS    #Hypertension  - hold losartan i/s/o soft BP on admission  - cautiously restart home meds as BP improves    DVT PPX: SCD's; no chemoprophylaxis i/s/o suspected GI bleed and thrombocytopenia  GI PPX: pantoprazole 40mg IV BID  DIET: DASH/TLC  ACTIVITY: IAT  CODE STATUS: Full Code  DISPOSITION: From [facility]; likely return to [facility] (f/u PT)    PENDING: heme/onc consult; ACS w/u; PT consult            # Handoff      CHIRAG DENISE 89y Female  MRN#: [id number]   CODE STATUS:________    Hospital Day: 1d    Pt is currently admitted with the primary diagnosis of     SUBJECTIVE  Hospital course   89F w/ h/o chronic anemia, HTN, DLD p/w anemia. Pt is from [facility]. Had outpatient labwork, which showed WBC 44.8, Hb 6.3, PLT 35, and Ferritin >1500. No reported fevers, chills, CP, palpitations, SOB, abdominal pain, n/v/d, dysuria, or LE swelling.    In ED, pt HD stable on vitals. DELANO positive for melena. Labs significant for WBC 62.16, Hb 6.9, PLT 35, BNP 2164, Tn 0.03, and Lactate 0.9. CTA AP demonstrated no evidence of active GI bleed, but did show inflammatory changes involving CBD within the pancreatic head raising a   question of cholangitis. S/P 1u pRBC, pantoprazole 40mg IV x1, and Lasix 20mg IV x1. Subsequently admitted to telemetry for ACS r/o and anemia.    Overnight events: none    Subjective complaints: none    Present Today:   - Rios:  No [  ], Yes [   ] : Indication:     - Type of IV Access:       .. CVC/Piccline:  No [  ], Yes [   ] : Indication:       .. Midline: No [  ], Yes [   ] : Indication:                                             ----------------------------------------------------------  OBJECTIVE  PAST MEDICAL & SURGICAL HISTORY  Chronic anemia    HTN (hypertension)    Dyslipidemia                                              -----------------------------------------------------------  ALLERGIES:  sulfa drugs (Other)                                            ------------------------------------------------------------    HOME MEDICATIONS  Home Medications:  aspirin 81 mg oral tablet, chewable: 1 tab(s) orally once a day (25 Mar 2023 21:36)  atorvastatin 80 mg oral tablet: 1 tab(s) orally once a day (at bedtime) (25 Mar 2023 21:35)  calcium carbonate 500 mg (200 mg elemental calcium) oral tablet, chewable: 1 tab(s) orally once a day (25 Mar 2023 21:36)  cyanocobalamin 1000 mcg oral tablet: 1 tab(s) orally once a day (25 Mar 2023 21:35)  ferrous sulfate 220 mg/5 mL (44 mg/5 mL elemental iron) oral elixir: 7.5 milliliter(s) orally 2 times a day (25 Mar 2023 21:29)  folic acid 1 mg oral tablet: 1 tab(s) orally once a day (25 Mar 2023 21:36)  gabapentin 100 mg oral capsule: 1 cap(s) orally 3 times a day (25 Mar 2023 21:36)  glucosamine hydrochloride 1500 mg oral tablet: 1 tab(s) orally once a day (25 Mar 2023 21:36)  losartan 25 mg oral tablet: 1 tab(s) orally once a day (25 Mar 2023 21:36)  melatonin 5 mg oral tablet: 1 tab(s) orally once a day (at bedtime) (25 Mar 2023 21:36)  MiraLax oral powder for reconstitution: 17 gram(s) orally once a day (25 Mar 2023 21:36)  Multiple Vitamins oral tablet: 1 tab(s) orally once a day (25 Mar 2023 21:36)  oxybutynin 5 mg/24 hours oral tablet, extended release: 1 tab(s) orally once a day (25 Mar 2023 21:36)  senna (sennosides) 8.6 mg oral tablet: 2 tab(s) orally once a day (at bedtime) (25 Mar 2023 21:36)  Vitamin C 500 mg oral tablet, chewable: 1 tab(s) chewed once a day (25 Mar 2023 21:36)                           MEDICATIONS:  STANDING MEDICATIONS  atorvastatin 80 milliGRAM(s) Oral at bedtime  calcium carbonate    500 mG (Tums) Chewable 1 Tablet(s) Chew daily  ferrous    sulfate 325 milliGRAM(s) Oral daily  gabapentin 100 milliGRAM(s) Oral three times a day  lactated ringers. 1000 milliLiter(s) IV Continuous <Continuous>  melatonin 5 milliGRAM(s) Oral at bedtime  multivitamin 1 Tablet(s) Oral daily  oxybutynin 5 milliGRAM(s) Oral daily  pantoprazole  Injectable 40 milliGRAM(s) IV Push two times a day  piperacillin/tazobactam IVPB. 3.375 Gram(s) IV Intermittent once  piperacillin/tazobactam IVPB.. 3.375 Gram(s) IV Intermittent every 8 hours  polyethylene glycol 3350 17 Gram(s) Oral daily  senna 2 Tablet(s) Oral at bedtime    PRN MEDICATIONS  acetaminophen     Tablet .. 650 milliGRAM(s) Oral every 6 hours PRN                                            ------------------------------------------------------------                                               --------------------------------------------------------------  LABS:                        8.2    44.15 )-----------( 36       ( 25 Mar 2023 23:00 )             27.4     03-25    137  |  103  |  29<H>  ----------------------------<  120<H>  4.0   |  20  |  1.3    Ca    8.0<L>      25 Mar 2023 23:00  Phos  4.8     03-25  Mg     1.6     03-25    TPro  5.8<L>  /  Alb  2.5<L>  /  TBili  0.6  /  DBili  x   /  AST  38  /  ALT  24  /  AlkPhos  69  03-25    PT/INR - ( 25 Mar 2023 23:00 )   PT: 13.80 sec;   INR: 1.20 ratio         PTT - ( 25 Mar 2023 23:00 )  PTT:30.0 sec              CARDIAC MARKERS ( 25 Mar 2023 00:30 )  x     / 0.03 ng/mL / x     / x     / x                                                    -------------------------------------------------------------  RADIOLOGY:                                            --------------------------------------------------------------  VITAL SIGNS: Last 24 Hours  T(C): 35.6 (26 Mar 2023 04:53), Max: 36.1 (25 Mar 2023 11:30)  T(F): 96.1 (26 Mar 2023 04:53), Max: 97 (25 Mar 2023 11:30)  HR: 82 (26 Mar 2023 04:53) (67 - 82)  BP: 147/58 (26 Mar 2023 04:53) (113/57 - 147/58)  BP(mean): --  RR: 18 (26 Mar 2023 04:53) (18 - 18)  SpO2: 100% (25 Mar 2023 13:00) (100% - 100%)    PHYSICAL EXAM:  General: Awake, oriented and resting comfortably, no acute distress  Head: normocephalic, atraumatic  ENT:  moist mucous membranes  Cardiac: + sys murmur   Respiratory: clear to auscultation bilaterally, no wheezes, crackles or rhonchi, unlabored respirations  Abdomen: normoactive bowel sounds x4, non tender, nondistended  Ext:  +1 edema   Neuro: A&O x3, no focal neurological deficits                                                --------------------------------------------------------------    ASSESSMENT & PLAN    Past medical history and hospital course     89F w/ h/o chronic anemia, HTN, DLD p/w anemia on outpatient labwork. Admitted to telemetry for ACS r/o and anemia.    #Acute on Chronic Macrocytic Anemia  Initially p/w anemia seen on outpatient labs. DELANO positive (however possibly false positive i/s/o PO iron use). Has been to the ED multiple times over past several months for anemia requiring blood transfusions. Previously seen by heme-onc on prior admission, but recommended against further w/u for chronic anemia given pt's advanced age.  - Trend Hb via daily CBC; transfuse if Hb<7  - Maintain active T&S   - c/w Procrit 40K SQ BIW (Tuesday/Friday)  - c/w Ferrous Sulfate  - start pantoprazole 40mg IV BID  - obtain B12, folate, LDH, haptoglobin, and reticulocyte count  - GI consulted; f/u recs    #Leukocytosis  #Bicytopenia  WBC 62 on admission (previously WBC 45 two days prior to admission per [facility] paperwork w/ Ferritin >1500). SIRS negative (no fevers, tachycardia, or tachypnea despite leukocytosis). No clear sign of infection on admission, but CTA AP on admission did show inflammatory changes involving CBD, raising concern for cholangitis. However, would expect elevated LFT's if this were the case. Differential includes malignancy (severe leukocytosis w/ bicytopenia, but no blasts on smear) and HLH (high ferritin, but no clear source of infection).   - obtain BCx  - start LR @ 75 cc/hr  - start Zosyn 3.375mg IV Q8H (long infusion protocol) for empiric intra-abdominal coverage  - obtain DIC and TLS panel  - heme-onc consulted-- hold off bone marrow biopsy for now; send peripheral cytometry to r/o bone marrow diseases    - check PTT/PT/INR, fibrinogen and d-dimer to r/o DIC  - check TLS panel including LDH, uric acid, phosphorous and CMP  - repeat B12 and folate level  - check LDH, haptoglobin and EMILY  - check CBC w/ diff daiyl  - keep active T&S and transfuse to hb > 7 and platelets > 10k   - c/w supportive transfusions and procrit injections.        #ACS r/o  On admission, Tn elevated to 0.03. No reported chest pain. Suspect demand ischemia i/s/o anemia  - admit to telemetry  - repeat EKG in AM  - repeat Tn at 4PM and in AM (first Tn .03)  - check lipid panel, A1c, and TSH  - obtain TTE    #Dyslipidemia  - c/w atorvastatin 80mg PO QHS    #Hypertension  - hold losartan i/s/o soft BP on admission  - cautiously restart home meds as BP improves    DVT PPX: SCD's; no chemoprophylaxis i/s/o suspected GI bleed and thrombocytopenia  GI PPX: pantoprazole 40mg IV BID  DIET: DASH/TLC  ACTIVITY: IAT  CODE STATUS: Full Code  DISPOSITION: From [facility]; likely return to [facility] (f/u PT)    PENDING: heme/onc consult; ACS w/u; PT consult            # Handoff

## 2023-03-26 NOTE — CONSULT NOTE ADULT - ASSESSMENT
89 yo F w/ PMH of chronic anemia, HTN, DLD presented with acute on chronic anemia. Pt is currently residing in the [facility] and had outpatient labwork which showed WBC 44.8k, Hb 6.3, PLT 35 and Ferritin >1500. In ED, pt underwent CT AP which showed inflammatory changes involving CBD within the pancreatic head raising a question of cholangitis, otherwise no CTA evidence of active gastrointestinal bleed. Her CBC in ED also shows leukocytosis with WBC of 62k and macrocytic anemia of 6.9. Hematology consulted for workup of pt's anemia and leukocytosis.    # Acute leukocytosis r/o acute leukemia   - other DDx include reactive from stress, infection   - r/o infectious etiology, check blood cx, urine cx, CXR   - will review smear  - would be more conservative given pt's age and hold off bone marrow biopsy for now; send peripheral cytometry to r/o bone marrow diseases    - check PTT/PT/INR, fibrinogen and d-dimer to r/o DIC  - check TLS panel including LDH, uric acid, phosphorous and CMP    # Acute on chronic macrocytic anemia   # Thrombocytopenia   - Baseline hb 7 to 8 (HIE labs).  - pt follows with hematologist at NH with Dr. Ayush Hilario, please obtain records  - B12, folate was previously checked and was WNL  - flow cytometry was done previously on 9/22 showed no remarkable abnormality.  - repeat B12 and folate level  - check LDH, haptoglobin and EMILY  - check CBC w/ diff daiyl  - keep active T&S and transfuse to hb > 7 and platelets > 10k   - c/w supportive transfusions and procrit injections. 89 yo F w/ PMH of chronic anemia, HTN, DLD presented with acute on chronic anemia. Pt is currently residing in the [facility] and had outpatient labwork which showed WBC 44.8k, Hb 6.3, PLT 35 and Ferritin >1500. In ED, pt underwent CT AP which showed inflammatory changes involving CBD within the pancreatic head raising a question of cholangitis, otherwise no CTA evidence of active gastrointestinal bleed. Her CBC in ED also shows leukocytosis with WBC of 62k and macrocytic anemia of 6.9. Hematology consulted for workup of pt's anemia and leukocytosis.    # Acute leukocytosis likely reactive from stress, infection and etc.   - r/o bone marrow disorder like acute leukemia, MDS    - f/u blood cx, urine cx  - will review smear  - would be more conservative given pt's age and hold off bone marrow biopsy for now; send peripheral cytometry to r/o bone marrow diseases (flow cytometry sheet in chart, please send 2 lavender and 2 dark green tops with flow cytometry sheet to lab on Monday 3/27/23)  - PTT/PT/INR, fibrinogen and d-dimer noted, doubt DIC  - TLS panel noted, uric acid elevated can be related to GILBERT, and LDH is hemolyzed. Repeat TLS panel.    # Acute on chronic macrocytic anemia   # Thrombocytopenia   - Baseline hb 7 to 8 (HIE labs).  - pt follows with hematologist at NH with Dr. Ayush Hilario, please obtain records  - B12, folate was previously checked and was WNL  - flow cytometry was done previously on 9/22 showed no remarkable abnormality.  - repeat B12 and folate level  - check LDH, haptoglobin and EIMLY  - check CBC w/ diff daiyl  - keep active T&S and transfuse to hb > 7 and platelets > 10k   - c/w supportive transfusions and procrit injections. 89 yo F w/ PMH of chronic anemia, HTN, DLD presented with acute on chronic anemia. Pt is currently residing in the [facility] and had outpatient labwork which showed WBC 44.8k, Hb 6.3, PLT 35 and Ferritin >1500. In ED, pt underwent CT AP which showed inflammatory changes involving CBD within the pancreatic head raising a question of cholangitis, otherwise no CTA evidence of active gastrointestinal bleed. Her CBC in ED also shows leukocytosis with WBC of 62k and macrocytic anemia of 6.9. Hematology consulted for workup of pt's anemia and leukocytosis.    # Acute leukocytosis likely reactive from stress, infection and etc.   - r/o bone marrow disorder like acute leukemia, MDS    - f/u blood cx, urine cx  - peripheral smear reviewed shows many monocytes, atypical lymphocytes, neutrophils and metamyelocytes   - would be more conservative given pt's age and hold off bone marrow biopsy for now; send peripheral cytometry to r/o bone marrow diseases (flow cytometry sheet in chart, please send 2 lavender and 2 dark green tops with flow cytometry sheet to lab on Monday 3/27/23)  - PTT/PT/INR, fibrinogen and d-dimer noted, doubt DIC  - TLS panel noted, uric acid elevated can be related to GILBERT, and LDH is hemolyzed. Repeat TLS panel.    # Acute on chronic macrocytic anemia   # Thrombocytopenia   - Baseline hb 7 to 8 (HIE labs).  - pt follows with hematologist at NH with Dr. Ayush Hilario, please obtain records  - B12, folate was previously checked and was WNL  - flow cytometry was done previously on 9/22 showed no remarkable abnormality.  - repeat B12 and folate level  - check LDH, haptoglobin and EMILY  - check CBC w/ diff daiyl  - keep active T&S and transfuse to hb > 7 and platelets > 10k   - c/w supportive transfusions and procrit injections. 89 yo F w/ PMH of chronic anemia, HTN, DLD presented with acute on chronic anemia. Pt is currently residing in the [facility] and had outpatient labwork which showed WBC 44.8k, Hb 6.3, PLT 35 and Ferritin >1500. In ED, pt underwent CT AP which showed inflammatory changes involving CBD within the pancreatic head raising a question of cholangitis, otherwise no CTA evidence of active gastrointestinal bleed. Her CBC in ED also shows leukocytosis with WBC of 62k and macrocytic anemia of 6.9. Hematology consulted for workup of pt's anemia and leukocytosis.    # Acute leukocytosis likely reactive from stress, infection and etc.   - r/o bone marrow disorder like acute leukemia, MDS    - f/u blood cx, urine cx  - peripheral smear reviewed shows many monocytes, atypical lymphocytes, neutrophils and metamyelocytes; did not appreciate any blasts  - would be more conservative given pt's age and hold off bone marrow biopsy for now; send peripheral cytometry to r/o bone marrow diseases (flow cytometry sheet in chart, please send 2 lavender and 2 dark green tops with flow cytometry sheet to lab on Monday 3/27/23)  - PTT/PT/INR, fibrinogen and d-dimer noted, doubt DIC  - TLS panel noted, uric acid elevated can be related to GILBERT, and LDH is hemolyzed. Repeat TLS panel.    # Acute on chronic macrocytic anemia   # Thrombocytopenia   - Baseline hb 7 to 8 (HIE labs).  - pt follows with hematologist at NH with Dr. Ayush Hilario, please obtain records  - B12, folate was previously checked and was WNL  - flow cytometry was done previously on 9/22 showed no remarkable abnormality.  - repeat B12 and folate level  - check LDH, haptoglobin and EMILY  - check CBC w/ diff daiyl  - keep active T&S and transfuse to hb > 7 and platelets > 10k   - c/w supportive transfusions and procrit injections. 89 yo F w/ PMH of chronic anemia, HTN, DLD presented with acute on chronic anemia. Pt is currently residing in the [facility] and had outpatient lab work which showed WBC 44.8k, Hb 6.3, PLT 35 and Ferritin >1500. In ED, pt underwent CT AP which showed inflammatory changes involving CBD within the pancreatic head raising a question of cholangitis, otherwise no CTA evidence of active gastrointestinal bleed. Her CBC in ED also shows leukocytosis with WBC of 62k and macrocytic anemia of 6.9. Hematology consulted for workup of pt's anemia and leukocytosis.    # Acute leukocytosis likely reactive from stress, infection and etc. Differential not showing any blasts.  - r/o bone marrow disorder like acute leukemia, MDS (unlikely)   - f/u blood cx, urine cx  - Peripheral smear reviewed shows many monocytes, atypical lymphocytes, neutrophils and metamyelocytes; did not appreciate any blasts  - Would be more conservative given pt's age and hold off bone marrow biopsy for now; send peripheral flow cytometry to r/o bone marrow diseases (flow cytometry request sheet in chart, please send 2 lavender and 2 dark green tops to be sent lab on Monday 3/27/23).  - PTT/PT/INR, fibrinogen and d-dimer noted, doubt DIC  - TLS panel noted, uric acid elevated can be related due to GILBERT, and LDH is hemolyzed. Repeat TLS panel.    # Acute on chronic macrocytic anemia   # Thrombocytopenia   - Baseline hgb 7 to 8 (HIE labs).  - Pt follows with hematologist at NH with Dr. Ayush Hilario, please obtain records  - B12, folate was previously checked and was WNL  - Flow cytometry was done previously on 9/22 showed no remarkable abnormality.  - Repeat B12 and folate level  - Check LDH, haptoglobin and EMILY  - Check CBC w/ diff daily  - Keep active T&S and transfuse to hb > 7 and platelets > 10k   - c/w supportive transfusions and procrit injections.

## 2023-03-27 DIAGNOSIS — D72.829 ELEVATED WHITE BLOOD CELL COUNT, UNSPECIFIED: ICD-10-CM

## 2023-03-27 DIAGNOSIS — D64.9 ANEMIA, UNSPECIFIED: ICD-10-CM

## 2023-03-27 DIAGNOSIS — Z51.5 ENCOUNTER FOR PALLIATIVE CARE: ICD-10-CM

## 2023-03-27 LAB
A1C WITH ESTIMATED AVERAGE GLUCOSE RESULT: 5.1 % — SIGNIFICANT CHANGE UP (ref 4–5.6)
ALBUMIN SERPL ELPH-MCNC: 2.5 G/DL — LOW (ref 3.5–5.2)
ALP SERPL-CCNC: 58 U/L — SIGNIFICANT CHANGE UP (ref 30–115)
ALT FLD-CCNC: 22 U/L — SIGNIFICANT CHANGE UP (ref 0–41)
ANION GAP SERPL CALC-SCNC: 12 MMOL/L — SIGNIFICANT CHANGE UP (ref 7–14)
AST SERPL-CCNC: 35 U/L — SIGNIFICANT CHANGE UP (ref 0–41)
BASOPHILS # BLD AUTO: 0.4 K/UL — HIGH (ref 0–0.2)
BASOPHILS NFR BLD AUTO: 0.8 % — SIGNIFICANT CHANGE UP (ref 0–1)
BILIRUB SERPL-MCNC: 0.9 MG/DL — SIGNIFICANT CHANGE UP (ref 0.2–1.2)
BUN SERPL-MCNC: 23 MG/DL — HIGH (ref 10–20)
CALCIUM SERPL-MCNC: 7.6 MG/DL — LOW (ref 8.4–10.5)
CHLORIDE SERPL-SCNC: 103 MMOL/L — SIGNIFICANT CHANGE UP (ref 98–110)
CO2 SERPL-SCNC: 23 MMOL/L — SIGNIFICANT CHANGE UP (ref 17–32)
CREAT SERPL-MCNC: 0.9 MG/DL — SIGNIFICANT CHANGE UP (ref 0.7–1.5)
DAT IGG-SP REAG RBC-IMP: ABNORMAL
DIR ANTIGLOB POLYSPECIFIC INTERPRETATION: ABNORMAL
EGFR: 57 ML/MIN/1.73M2 — LOW
EOSINOPHIL # BLD AUTO: 0.18 K/UL — SIGNIFICANT CHANGE UP (ref 0–0.7)
EOSINOPHIL NFR BLD AUTO: 0.3 % — SIGNIFICANT CHANGE UP (ref 0–8)
ESTIMATED AVERAGE GLUCOSE: 100 MG/DL — SIGNIFICANT CHANGE UP (ref 68–114)
FOLATE SERPL-MCNC: >20 NG/ML — SIGNIFICANT CHANGE UP
GLUCOSE SERPL-MCNC: 110 MG/DL — HIGH (ref 70–99)
HCT VFR BLD CALC: 26.1 % — LOW (ref 37–47)
HGB BLD-MCNC: 7.8 G/DL — LOW (ref 12–16)
IAT COMP-SP REAG SERPL QL: ABNORMAL
IMM GRANULOCYTES NFR BLD AUTO: 4.2 % — HIGH (ref 0.1–0.3)
IRON SATN MFR SERPL: 114 UG/DL — SIGNIFICANT CHANGE UP (ref 35–150)
LDH SERPL L TO P-CCNC: 493 — HIGH (ref 50–242)
LYMPHOCYTES # BLD AUTO: 14.4 % — LOW (ref 20.5–51.1)
LYMPHOCYTES # BLD AUTO: 7.52 K/UL — HIGH (ref 1.2–3.4)
MCHC RBC-ENTMCNC: 29.9 G/DL — LOW (ref 32–37)
MCHC RBC-ENTMCNC: 29.9 PG — SIGNIFICANT CHANGE UP (ref 27–31)
MCV RBC AUTO: 100 FL — HIGH (ref 81–99)
MONOCYTES # BLD AUTO: 27.68 K/UL — HIGH (ref 0.1–0.6)
MONOCYTES NFR BLD AUTO: 53.1 % — HIGH (ref 1.7–9.3)
NEUTROPHILS # BLD AUTO: 14.2 K/UL — HIGH (ref 1.4–6.5)
NEUTROPHILS NFR BLD AUTO: 27.2 % — LOW (ref 42.2–75.2)
NRBC # BLD: 5 /100 WBCS — HIGH (ref 0–0)
PLATELET # BLD AUTO: 26 K/UL — LOW (ref 130–400)
POTASSIUM SERPL-MCNC: 3 MMOL/L — LOW (ref 3.5–5)
POTASSIUM SERPL-SCNC: 3 MMOL/L — LOW (ref 3.5–5)
PROT SERPL-MCNC: 5.7 G/DL — LOW (ref 6–8)
RBC # BLD: 2.61 M/UL — LOW (ref 4.2–5.4)
RBC # FLD: 24.3 % — HIGH (ref 11.5–14.5)
SODIUM SERPL-SCNC: 138 MMOL/L — SIGNIFICANT CHANGE UP (ref 135–146)
T4 FREE+ TSH PNL SERPL: 1.74 UIU/ML — SIGNIFICANT CHANGE UP (ref 0.27–4.2)
TIBC SERPL-MCNC: <131 UG/DL — LOW (ref 220–430)
UIBC SERPL-MCNC: <17 UG/DL — LOW (ref 110–370)
VIT B12 SERPL-MCNC: >2000 PG/ML — HIGH (ref 232–1245)
WBC # BLD: 52.17 K/UL — CRITICAL HIGH (ref 4.8–10.8)
WBC # FLD AUTO: 52.17 K/UL — CRITICAL HIGH (ref 4.8–10.8)

## 2023-03-27 PROCEDURE — 99223 1ST HOSP IP/OBS HIGH 75: CPT

## 2023-03-27 PROCEDURE — 71045 X-RAY EXAM CHEST 1 VIEW: CPT | Mod: 26

## 2023-03-27 PROCEDURE — 93306 TTE W/DOPPLER COMPLETE: CPT | Mod: 26

## 2023-03-27 RX ORDER — FUROSEMIDE 40 MG
40 TABLET ORAL ONCE
Refills: 0 | Status: COMPLETED | OUTPATIENT
Start: 2023-03-27 | End: 2023-03-27

## 2023-03-27 RX ORDER — LOSARTAN POTASSIUM 100 MG/1
25 TABLET, FILM COATED ORAL DAILY
Refills: 0 | Status: DISCONTINUED | OUTPATIENT
Start: 2023-03-27 | End: 2023-03-31

## 2023-03-27 RX ORDER — MAGNESIUM SULFATE 500 MG/ML
2 VIAL (ML) INJECTION ONCE
Refills: 0 | Status: COMPLETED | OUTPATIENT
Start: 2023-03-27 | End: 2023-03-27

## 2023-03-27 RX ORDER — POTASSIUM CHLORIDE 20 MEQ
40 PACKET (EA) ORAL ONCE
Refills: 0 | Status: COMPLETED | OUTPATIENT
Start: 2023-03-27 | End: 2023-03-27

## 2023-03-27 RX ORDER — HEPARIN SODIUM 5000 [USP'U]/ML
5000 INJECTION INTRAVENOUS; SUBCUTANEOUS EVERY 12 HOURS
Refills: 0 | Status: DISCONTINUED | OUTPATIENT
Start: 2023-03-27 | End: 2023-03-27

## 2023-03-27 RX ORDER — POTASSIUM CHLORIDE 20 MEQ
20 PACKET (EA) ORAL ONCE
Refills: 0 | Status: COMPLETED | OUTPATIENT
Start: 2023-03-27 | End: 2023-03-27

## 2023-03-27 RX ADMIN — SODIUM CHLORIDE 75 MILLILITER(S): 9 INJECTION, SOLUTION INTRAVENOUS at 09:11

## 2023-03-27 RX ADMIN — GABAPENTIN 100 MILLIGRAM(S): 400 CAPSULE ORAL at 13:54

## 2023-03-27 RX ADMIN — POLYETHYLENE GLYCOL 3350 17 GRAM(S): 17 POWDER, FOR SOLUTION ORAL at 11:50

## 2023-03-27 RX ADMIN — PIPERACILLIN AND TAZOBACTAM 25 GRAM(S): 4; .5 INJECTION, POWDER, LYOPHILIZED, FOR SOLUTION INTRAVENOUS at 13:55

## 2023-03-27 RX ADMIN — PIPERACILLIN AND TAZOBACTAM 25 GRAM(S): 4; .5 INJECTION, POWDER, LYOPHILIZED, FOR SOLUTION INTRAVENOUS at 05:08

## 2023-03-27 RX ADMIN — PANTOPRAZOLE SODIUM 40 MILLIGRAM(S): 20 TABLET, DELAYED RELEASE ORAL at 17:02

## 2023-03-27 RX ADMIN — GABAPENTIN 100 MILLIGRAM(S): 400 CAPSULE ORAL at 22:00

## 2023-03-27 RX ADMIN — Medication 40 MILLIEQUIVALENT(S): at 14:13

## 2023-03-27 RX ADMIN — Medication 40 MILLIGRAM(S): at 16:59

## 2023-03-27 RX ADMIN — Medication 5 MILLIGRAM(S): at 11:49

## 2023-03-27 RX ADMIN — Medication 325 MILLIGRAM(S): at 11:49

## 2023-03-27 RX ADMIN — PIPERACILLIN AND TAZOBACTAM 25 GRAM(S): 4; .5 INJECTION, POWDER, LYOPHILIZED, FOR SOLUTION INTRAVENOUS at 22:00

## 2023-03-27 RX ADMIN — GABAPENTIN 100 MILLIGRAM(S): 400 CAPSULE ORAL at 05:07

## 2023-03-27 RX ADMIN — Medication 1 TABLET(S): at 11:49

## 2023-03-27 RX ADMIN — PANTOPRAZOLE SODIUM 40 MILLIGRAM(S): 20 TABLET, DELAYED RELEASE ORAL at 05:07

## 2023-03-27 RX ADMIN — Medication 5 MILLIGRAM(S): at 22:00

## 2023-03-27 RX ADMIN — SENNA PLUS 2 TABLET(S): 8.6 TABLET ORAL at 22:00

## 2023-03-27 RX ADMIN — Medication 50 MILLIEQUIVALENT(S): at 14:14

## 2023-03-27 RX ADMIN — Medication 25 GRAM(S): at 11:48

## 2023-03-27 RX ADMIN — ATORVASTATIN CALCIUM 80 MILLIGRAM(S): 80 TABLET, FILM COATED ORAL at 22:00

## 2023-03-27 NOTE — CONSULT NOTE ADULT - SUBJECTIVE AND OBJECTIVE BOX
CC: anemia    HPI:  89F w/ h/o chronic anemia, HTN, DLD p/w anemia. Pt is from [facility]. Had outpatient labwork, which showed WBC 44.8, Hb 6.3, PLT 35, and Ferritin >1500. No reported fevers, chills, CP, palpitations, SOB, abdominal pain, n/v/d, dysuria, or LE swelling.    In ED, pt HD stable on vitals. DELANO positive for melena. Labs significant for WBC 62.16, Hb 6.9, PLT 35, BNP 2164, Tn 0.03, and Lactate 0.9. CTA AP demonstrated no evidence of active GI bleed, but did show inflammatory changes involving CBD within the pancreatic head raising a   question of cholangitis. S/P 1u pRBC, pantoprazole 40mg IV x1, and Lasix 20mg IV x1. Subsequently admitted to telemetry for ACS r/o and anemia. (25 Mar 2023 18:18)    PERTINENT PM/SXH:   No pertinent past medical history    Chronic anemia    HTN (hypertension)    HLD (hyperlipidemia)    Dyslipidemia        FAMILY HISTORY:  Unable to obtain from patient    ITEMS NOT CHECKED ARE NOT PRESENT    SOCIAL HISTORY:   Significant other/partner[ ]  Children[ ]  Holiness/Spirituality:  Substance hx:  [ ]   Tobacco hx:  [ ]   Alcohol hx: [ ]   Living Situation: [ ]Home  [x ]Long term care  [ ]Rehab [ ]Other  Home Services: [ ] HHA [ ] Visting RN [ ] Hospice  Occupation:  Home Opioid hx:  [ ] Y [ ] N [ x] I-Stop Reference No: Reference #: [id number] - no meds    ADVANCE DIRECTIVES:    [x ] Full Code [ ] DNR  MOLST  [ ]  Living Will  [ ]   DECISION MAKER(s):  [ x] Health Care Proxy(s)  [ ] Surrogate(s)  [ ] Guardian           Name(s): Phone Number(s): Howie Malik [phone]    BASELINE (I)ADL(s) (prior to admission):  [place]: [ ]Total  [ x] Moderate [ ]Dependent  Palliative Performance Status Version 2:         50%    http://npcrc.org/files/news/palliative_performance_scale_ppsv2.pdf    Allergies    sulfa drugs (Other)    Intolerances    MEDICATIONS  (STANDING):  atorvastatin 80 milliGRAM(s) Oral at bedtime  calcium carbonate    500 mG (Tums) Chewable 1 Tablet(s) Chew daily  ferrous    sulfate 325 milliGRAM(s) Oral daily  furosemide   Injectable 40 milliGRAM(s) IV Push once  gabapentin 100 milliGRAM(s) Oral three times a day  losartan 25 milliGRAM(s) Oral daily  melatonin 5 milliGRAM(s) Oral at bedtime  multivitamin 1 Tablet(s) Oral daily  oxybutynin 5 milliGRAM(s) Oral daily  pantoprazole  Injectable 40 milliGRAM(s) IV Push two times a day  piperacillin/tazobactam IVPB. 3.375 Gram(s) IV Intermittent once  piperacillin/tazobactam IVPB.. 3.375 Gram(s) IV Intermittent every 8 hours  polyethylene glycol 3350 17 Gram(s) Oral daily  senna 2 Tablet(s) Oral at bedtime    MEDICATIONS  (PRN):  acetaminophen     Tablet .. 650 milliGRAM(s) Oral every 6 hours PRN Temp greater or equal to 38C (100.4F), Mild Pain (1 - 3)    PRESENT SYMPTOMS: [ ]Unable to obtain due to poor mentation   Source if other than patient:  [ ]Family   [ ]Team     Pain: [ ]yes [ x]no  QOL impact -   Location -                    Aggravating factors -  Quality -  Radiation -  Timing-  Severity (0-10 scale):  Minimal acceptable level (0-10 scale):     CPOT:    https://www.sccm.org/getattachment/dgr14m17-2t2t-3a5t-4y1f-7946s5747v4c/Critical-Care-Pain-Observation-Tool-(CPOT)    PAIN AD Score:   http://geriatrictoolkit.[place].[facility]/cog/painad.pdf (press ctrl +  left click to view)    Dyspnea:                           [x ]None[ ]Mild [ ]Moderate [ ]Severe     Respiratory Distress Observation Scale (RDOS):   A score of 0 to 2 signifies little or no respiratory distress, 3 signifies mild distress, scores 4 to 6 indicate moderate distress, and scores greater than 7 signify severe distress  https://www.[facility].ca/sites/default/files/PDFS/707867-wvfvphngklc-bqsuhfde-fncpcdnackl-ravwb.pdf    Anxiety:                             [x ]None[ ]Mild [ ]Moderate [ ]Severe   Fatigue:                             [x]None[ ]Mild [ ]Moderate [ ]Severe   Nausea:                            [x]None[ ]Mild [ ]Moderate [ ]Severe   Loss of appetite:              [x ]None[ ]Mild [ ]Moderate [ ]Severe   Constipation:                    [x ]None[ ]Mild [ ]Moderate [ ]Severe    Other Symptoms:  [ ]All other review of systems negative     Palliative Performance Status Version 2:        30 %    http://[facility].org/files/news/palliative_performance_scale_ppsv2.pdf  PHYSICAL EXAM:  Vital Signs Last 24 Hrs  T(C): 37 (27 Mar 2023 14:01), Max: 37 (27 Mar 2023 14:01)  T(F): 98.6 (27 Mar 2023 14:01), Max: 98.6 (27 Mar 2023 14:01)  HR: 91 (27 Mar 2023 14:01) (70 - 94)  BP: 117/51 (27 Mar 2023 14:01) (112/66 - 159/67)  BP(mean): --  RR: 18 (27 Mar 2023 14:01) (18 - 18)  SpO2: 94% (27 Mar 2023 08:34) (94% - 97%)    Parameters below as of 27 Mar 2023 08:34  Patient On (Oxygen Delivery Method): nasal cannula  O2 Flow (L/min): 2   I&O's Summary      GENERAL:  [x ] No acute distress [ ]Lethargic  [ ]Unarousable  [ ]Verbal  [ ]Non-Verbal [ ]Cachexia    BEHAVIORAL/PSYCH:  [ x]Alert and Oriented x3  [ ] Anxiety [ ] Delirium [ ] Agitation [ x] Calm   EYES: [x ] No scleral icterus [ ] Scleral icterus [ ] Closed  ENMT:  [ ]Dry mouth  [x ]No external oral lesions [ ] No external ear or nose lesions  CARDIOVASCULAR:  [ ]Regular [ ]Irregular [ ]Tachy [x ]Not Tachy  [ ]Nima [ ] Edema [ ] No edema  PULMONARY:  [ ]Tachypnea  [ ]Audible excessive secretions [x ] No labored breathing [ ] labored breathing  GASTROINTESTINAL: [ ]Soft  [ ]Distended  [ x]Not distended [ ]Non tender [ ]Tender  MUSCULOSKELETAL: [x ]No clubbing [ ] clubbing  [ ] No cyanosis [ ] cyanosis  NEUROLOGIC: [ ]No focal deficits  [ x]Follows commands  [ ]Does not follow commands  [ ]Cognitive impairment  [ ]Dysphagia  [ ]Dysarthria  [ ]Paresis [x] Hard of hearing  SKIN: [ ] Jaundiced [x ] Non-jaundiced [ ]Rash [ ]No Rash [ ] Warm [ ] Dry  MISC/LINES: [ ] ET tube [ ] Trach [ ]NGT/OGT [ ]PEG [ ]Rios    LABS: reviewed by me                        7.8    52.17 )-----------( 26       ( 27 Mar 2023 07:43 )             26.1   03-27    138  |  103  |  23<H>  ----------------------------<  110<H>  3.0<L>   |  23  |  0.9    Ca    7.6<L>      27 Mar 2023 07:43  Phos  4.1     03-26  Mg     1.4     03-26    TPro  5.7<L>  /  Alb  2.5<L>  /  TBili  0.9  /  DBili  x   /  AST  35  /  ALT  22  /  AlkPhos  58  03-27  PT/INR - ( 25 Mar 2023 23:00 )   PT: 13.80 sec;   INR: 1.20 ratio         PTT - ( 25 Mar 2023 23:00 )  PTT:30.0 sec      RADIOLOGY & ADDITIONAL STUDIES: reviewed by me  < from: CT Angio Abdomen and Pelvis w/ IV Cont (03.25.23 @ 04:41) >  IMPRESSION:      Inflammatory changes involving CBD within the pancreatic head raising a   question of cholangitis. Please correlate with symptoms.    No CTA evidence of active gastrointestinal bleed.    Otherwise, no evidence of acute abdominal or pelvic pathology.    --- End of Report ---    < end of copied text >      EKG: reviewed by me  < from: 12 Lead ECG (03.26.23 @ 08:35) >    Ventricular Rate 86 BPM    Atrial Rate 86 BPM    P-R Interval 166 ms    QRS Duration 90 ms    Q-T Interval 352 ms    QTC Calculation(Bazett) 421 ms    P Axis 42 degrees    R Axis -34 degrees    T Axis -5 degrees    Diagnosis Line Sinus rhythm with Premature atrial complexes  Left axis deviation  Abnormal ECG    < end of copied text >      PROTEIN CALORIE MALNUTRITION PRESENT: [ ]mild [ ]moderate [ ]severe [ ]underweight [ ]morbid obesity  https://www.andeal.org/vault/2944/web/files/ONC/Table_Clinical%20Characteristics%20to%20Document%20Malnutrition-White%20JV%20et%20al%913178.pdf    Height (cm): 154.9 (03-24-23 @ 20:51), 154.9 (02-21-23 @ 20:34), 154.9 (01-31-23 @ 20:17)  Weight (kg): 54.4 (03-24-23 @ 20:51), 52.2 (01-24-23 @ 19:41), 59 (12-27-22 @ 19:18)  BMI (kg/m2): 22.7 (03-24-23 @ 20:51), 21.8 (02-21-23 @ 20:34), 21.8 (01-31-23 @ 20:17)    [ ]PPSV2 < or = to 30% [ ]significant weight loss  [ ]poor nutritional intake  [ ]anasarca      [ ]Artificial Nutrition      REFERRALS:   [x ]Chaplaincy  [ ]Hospice  [ ]Child Life  [ x]Social Work  [ ]Case management [ ]Holistic Therapy     Patient discussed with primary medical team MD  Palliative care education provided to patient and/or family    Goals of Care Document:

## 2023-03-27 NOTE — PROGRESS NOTE ADULT - SUBJECTIVE AND OBJECTIVE BOX
Hospital day # : 2d  Events Overnight: none    Subjective: pt drowsy but arousable. denies any complaints today    Physical Exam:  General: no acute distress, frail-appearing elderly female  Head: atraumatic, normocephalic  Eyes: EOMI, no icterus  Lungs/Chest: dec bs bilaterally, no wheeze  Heart: regular rate, regular rhythm, S1/S2  Abdomen: BS audible, non-distended, soft, non-tender  Extremities: no clubbing, no cyanosis, no edema. +ecchymosis bilateral arms  Neuro: drowsy, speech clear and fluent, moving all extremities    Recent data:  T(C): 36.1 (03-27-23 @ 05:20), Max: 36.1 (03-26-23 @ 19:53)  HR: 94 (03-27-23 @ 08:34) (70 - 94)  BP: 159/67 (03-27-23 @ 05:20) (112/66 - 159/67)  RR: 18 (03-27-23 @ 08:34) (18 - 18)  SpO2: 94% (03-27-23 @ 08:34) (94% - 97%) on 2L    I&O's Summary                            7.8    52.17 )-----------( 26       ( 27 Mar 2023 07:43 )             26.1                         7.8    46.78 )-----------( 25       ( 26 Mar 2023 07:40 )             25.5       27 Mar 2023 07:43    138    |  103    |  23     ----------------------------<  110    3.0     |  23     |  0.9    26 Mar 2023 07:40    133    |  100    |  27     ----------------------------<  114    3.9     |  18     |  1.3    25 Mar 2023 23:00    137    |  103    |  29     ----------------------------<  120    4.0     |  20     |  1.3       Ca    7.6        27 Mar 2023 07:43  Ca    7.7        26 Mar 2023 07:40  Ca    8.0        25 Mar 2023 23:00  Phos  4.1       26 Mar 2023 07:40  Phos  4.8       25 Mar 2023 23:00  Mg     1.4       26 Mar 2023 07:40  Mg     1.6       25 Mar 2023 23:00    Lactate Dehydrogenase, Serum: 470 (03-25-23 @ 23:00)    Uric Acid, Serum: 8.9 mg/dL (03-25-23 @ 23:00)    Creatine Kinase        Assessment/Plan:  89F with h/o chronic anemia, HTN, DLD p/w anemia on outpatient labwork. Admitted to telemetry for ACS r/o and anemia.    #Cough/ hypoxia  -94% on 2L NC, wet cough  -will repeat CXR today    #Acute on Chronic Macrocytic Anemia - stable  #severe Thrombocytopenia - worsening  Initially p/w anemia seen on outpatient labs. DELANO positive (however possibly false positive i/s/o PO iron use). Has been to the ED multiple times over past several months for anemia requiring blood transfusions. Previously seen by heme-onc on prior admission, but recommended against further w/u for chronic anemia given pt's advanced age.  - keep Hgb > 7 and Platelet > 10  - Maintain active T&S   - c/w Procrit 40K SQ BIW (Tuesday/Friday)  - c/w Ferrous Sulfate  - start pantoprazole 40mg IV BID  - f/u hemolysis labs, iron studies  - hgb stable for now, more likely hematologic, consider GI if hgb continues to downtrend or if iron deficient    #Leukocytosis - predominantly monocytic  WBC 62 on admission (previously WBC 45 two days prior to admission per [facility] paperwork w/ Ferritin >1500). SIRS negative (no fevers, tachycardia, or tachypnea despite leukocytosis). No clear sign of infection on admission, but CTA AP on admission did show inflammatory changes involving CBD, raising concern for cholangitis. However, would expect elevated LFT's if this were the case. Differential includes malignancy (severe leukocytosis w/ bicytopenia, but no blasts on smear) and HLH (high ferritin, but no clear source of infection).   - heme/onc w/u:  flow cytometry / repeat TLS panel (hemolyzed) / EMILY/ LDH/ Haptoglobin  - infectious w/u: BCX,  check EBV, TB, doubt fugnal  - DIC negative  - on LR @ 75 cc/hr  - on Zosyn 3.375mg IV Q8H (long infusion protocol) for empiric intra-abdominal coverage - cont  - heme-onc on board-- hold off bone marrow biopsy for now; send peripheral cytometry to r/o bone marrow diseases      #ACS r/o  On admission, Tn elevated to 0.03. No reported chest pain. Suspect demand ischemia iso anemia  - trops stable, ekg non-ischemic  - f/u TTE    #Dyslipidemia  - c/w atorvastatin 80mg PO QHS    #Hypertension  - hold losartan i/s/o soft BP on admission  - cautiously restart home meds as BP improves    DVT PPX: SCD's; no chemoprophylaxis i/s/o suspected GI bleed and thrombocytopenia  CODE STATUS: Full Code  DISPOSITION: From [facility]; likely return to [facility] (needs long term nursing home per PT eval)  PENDING: heme/onc + infectious w/u, TTE, stable H&H

## 2023-03-27 NOTE — CONSULT NOTE ADULT - PROBLEM SELECTOR RECOMMENDATION 9
with thrombocytopenia  -has requiring blood transfusions multiple times in the past  -f/u hemolysis labs and iron studies  -f/u heme onc

## 2023-03-27 NOTE — CONSULT NOTE ADULT - PROBLEM SELECTOR RECOMMENDATION 3
-Full code  -ongoing medical management  -Multiple MOLSTs in past show full code  -HCP: Zachary Malik [phone]  -will be available for GOC discussions as appropriate

## 2023-03-27 NOTE — PROGRESS NOTE ADULT - ATTENDING COMMENTS
89F w/ h/o chronic anemia, HTN, DLD p/w anemia on outpatient lab work. Admitted to telemetry for ACS r/o and anemia.    # leucocytosis- with left side shift and monocytosis- r/o related to hematologic malignancy  - no clear s/o infection  - check blood cultures- not collected; repeat order placed  - continue empiric antibiotics- on zosym  - check procal  CXR: 3/25: No radiographic evidence of acute cardiopulmonary disease.  CT angio A/P Inflammatory changes involving CBD within the pancreatic head raising a question of cholangitis. Please correlate with symptoms.  No CTA evidence of active gastrointestinal bleed.  hematology team following: - Peripheral smear: shows many monocytes, atypical lymphocytes, neutrophils and metamyelocytes; did not appreciate any blasts. Acute leukocytosis likely reactive from stress, infection and etc. Differential not showing any blasts.  - r/o bone marrow disorder like acute leukemia, MDS (unlikely)   -  flow cytometry  - repeat TLS panel    #Acute on Chronic Macrocytic Anemia  # thrombocytopenia  - r/o related to bone marrow disease  hg stable; monitor CBC, type and screen  heme onc following: pending blood work;  - Pt follows with hematologist at NH with Dr. Ayush Hilario,-obtain records  - Flow cytometry was done previously on 9/22 showed no remarkable abnormality.  - Repeat B12 and folate level- pending results  - Check LDH, haptoglobin and EMILY- pending  - c/w Procrit 40K SQ BIW (Tuesday/Friday)  - c/w Ferrous Sulfate  - pantoprazole 40mg IV BID  GI evaluated:     No evidence of GI bleed (r/o any other causes for low Hb)    No evidence of cholangitis (normal LFT, no pain, no fever)    # ACS ruled out  troponin mild stable elevation. No reported chest pain. Suspect demand ischemia i/s/o anemia  - admit to telemetry  - TTE pending    #Dyslipidemia  - c/w atorvastatin 80mg PO QHS    #Hypertension  -  restart losartan 25 mg  - monitor BP    DVT PPX: SCD  GI px- protonix    PENDING: heme/onc follow up; work up for leucocytosis and anemia; monitoring cbc

## 2023-03-27 NOTE — CONSULT NOTE ADULT - CONVERSATION DETAILS
Spoke with patient at bedside. Palliative care introduced. Discussed code status. Patient wishes to maintain full code with ongoing aggressive medical management and workup.  All questions answered.

## 2023-03-27 NOTE — CONSULT NOTE ADULT - PROBLEM SELECTOR RECOMMENDATION 2
WBC 62 on admission.   -continue zosyn q8h for empiric intra abdominal coverage  -flow cyto and TLS panel/additional workup per heem onc to rule out bone marrow disease  -f/u cultures  -f/u heme onc

## 2023-03-27 NOTE — CONSULT NOTE ADULT - ASSESSMENT
89 year old woman with history of HTN, anemia, DLD presents with anemia from NH. Hospital course complicated by evidence of acute on chronic anemia, thrombocytopenia, acute leukocytosis. Palliative care consulted for GOC.    Spoke with patient at bedside. Palliative care introduced. Discussed code status. Patient wishes to maintain full code with ongoing aggressive medical management and workup.  All questions answered.    MEDD (morphine equivalent daily dose):    Education about palliative care provided to patient/family.  See Recs below.    Please call [phone] with questions or concerns 24/7.   We will continue to follow.

## 2023-03-28 LAB
ALBUMIN SERPL ELPH-MCNC: 2.5 G/DL — LOW (ref 3.5–5.2)
ALP SERPL-CCNC: 52 U/L — SIGNIFICANT CHANGE UP (ref 30–115)
ALT FLD-CCNC: 23 U/L — SIGNIFICANT CHANGE UP (ref 0–41)
ANION GAP SERPL CALC-SCNC: 11 MMOL/L — SIGNIFICANT CHANGE UP (ref 7–14)
AST SERPL-CCNC: 42 U/L — HIGH (ref 0–41)
BASOPHILS # BLD AUTO: 0.55 K/UL — HIGH (ref 0–0.2)
BASOPHILS NFR BLD AUTO: 0.9 % — SIGNIFICANT CHANGE UP (ref 0–1)
BILIRUB SERPL-MCNC: 1.3 MG/DL — HIGH (ref 0.2–1.2)
BLD GP AB SCN SERPL QL: SIGNIFICANT CHANGE UP
BUN SERPL-MCNC: 28 MG/DL — HIGH (ref 10–20)
CALCIUM SERPL-MCNC: 7.5 MG/DL — LOW (ref 8.4–10.5)
CHLORIDE SERPL-SCNC: 101 MMOL/L — SIGNIFICANT CHANGE UP (ref 98–110)
CO2 SERPL-SCNC: 24 MMOL/L — SIGNIFICANT CHANGE UP (ref 17–32)
CREAT SERPL-MCNC: 1.1 MG/DL — SIGNIFICANT CHANGE UP (ref 0.7–1.5)
EBV EA AB SER IA-ACNC: 115 U/ML — HIGH
EBV EA AB TITR SER IF: POSITIVE
EBV EA IGG SER-ACNC: POSITIVE
EBV NA IGG SER IA-ACNC: 49.8 U/ML — HIGH
EBV PATRN SPEC IB-IMP: SIGNIFICANT CHANGE UP
EBV VCA IGG AVIDITY SER QL IA: POSITIVE
EBV VCA IGM SER IA-ACNC: <10 U/ML — SIGNIFICANT CHANGE UP
EBV VCA IGM SER IA-ACNC: >750 U/ML — HIGH
EBV VCA IGM TITR FLD: NEGATIVE — SIGNIFICANT CHANGE UP
EGFR: 45 ML/MIN/1.73M2 — LOW
EOSINOPHIL # BLD AUTO: 0.16 K/UL — SIGNIFICANT CHANGE UP (ref 0–0.7)
EOSINOPHIL NFR BLD AUTO: 0.3 % — SIGNIFICANT CHANGE UP (ref 0–8)
FERRITIN SERPL-MCNC: 6341 NG/ML — HIGH (ref 15–150)
FOLATE SERPL-MCNC: >20 NG/ML — SIGNIFICANT CHANGE UP
GAS PNL BLDA: SIGNIFICANT CHANGE UP
GLUCOSE SERPL-MCNC: 109 MG/DL — HIGH (ref 70–99)
HAPTOGLOB SERPL-MCNC: 151 MG/DL — SIGNIFICANT CHANGE UP (ref 34–200)
HCT VFR BLD CALC: 24.5 % — LOW (ref 37–47)
HGB BLD-MCNC: 7.6 G/DL — LOW (ref 12–16)
IMM GRANULOCYTES NFR BLD AUTO: 4.4 % — HIGH (ref 0.1–0.3)
IRON SATN MFR SERPL: 112 UG/DL — SIGNIFICANT CHANGE UP (ref 35–150)
LDH SERPL L TO P-CCNC: 507 — HIGH (ref 50–242)
LYMPHOCYTES # BLD AUTO: 11.65 K/UL — HIGH (ref 1.2–3.4)
LYMPHOCYTES # BLD AUTO: 20.1 % — LOW (ref 20.5–51.1)
MCHC RBC-ENTMCNC: 30.8 PG — SIGNIFICANT CHANGE UP (ref 27–31)
MCHC RBC-ENTMCNC: 31 G/DL — LOW (ref 32–37)
MCV RBC AUTO: 99.2 FL — HIGH (ref 81–99)
MONOCYTES # BLD AUTO: 31.23 K/UL — HIGH (ref 0.1–0.6)
MONOCYTES NFR BLD AUTO: 53.9 % — HIGH (ref 1.7–9.3)
NEUTROPHILS # BLD AUTO: 11.77 K/UL — HIGH (ref 1.4–6.5)
NEUTROPHILS NFR BLD AUTO: 20.4 % — LOW (ref 42.2–75.2)
NRBC # BLD: 3 /100 WBCS — HIGH (ref 0–0)
PLATELET # BLD AUTO: 23 K/UL — LOW (ref 130–400)
POTASSIUM SERPL-MCNC: 3.5 MMOL/L — SIGNIFICANT CHANGE UP (ref 3.5–5)
POTASSIUM SERPL-SCNC: 3.5 MMOL/L — SIGNIFICANT CHANGE UP (ref 3.5–5)
PROCALCITONIN SERPL-MCNC: 0.44 NG/ML — HIGH (ref 0.02–0.1)
PROT SERPL-MCNC: 5.9 G/DL — LOW (ref 6–8)
RBC # BLD: 2.47 M/UL — LOW (ref 4.2–5.4)
RBC # BLD: 2.47 M/UL — LOW (ref 4.2–5.4)
RBC # FLD: 23.9 % — HIGH (ref 11.5–14.5)
RETICS #: 62 K/UL — SIGNIFICANT CHANGE UP (ref 25–125)
RETICS/RBC NFR: 2.5 % — HIGH (ref 0.5–1.5)
SODIUM SERPL-SCNC: 136 MMOL/L — SIGNIFICANT CHANGE UP (ref 135–146)
TIBC SERPL-MCNC: <129 UG/DL — LOW (ref 220–430)
UIBC SERPL-MCNC: <17 UG/DL — LOW (ref 110–370)
URATE SERPL-MCNC: 6.1 MG/DL — SIGNIFICANT CHANGE UP (ref 2.5–7)
VIT B12 SERPL-MCNC: >2000 PG/ML — HIGH (ref 232–1245)
WBC # BLD: 57.91 K/UL — CRITICAL HIGH (ref 4.8–10.8)
WBC # FLD AUTO: 57.91 K/UL — CRITICAL HIGH (ref 4.8–10.8)

## 2023-03-28 PROCEDURE — 99233 SBSQ HOSP IP/OBS HIGH 50: CPT

## 2023-03-28 PROCEDURE — 93970 EXTREMITY STUDY: CPT | Mod: 26

## 2023-03-28 PROCEDURE — 70450 CT HEAD/BRAIN W/O DYE: CPT | Mod: 26

## 2023-03-28 RX ORDER — PANTOPRAZOLE SODIUM 20 MG/1
40 TABLET, DELAYED RELEASE ORAL
Refills: 0 | Status: DISCONTINUED | OUTPATIENT
Start: 2023-03-28 | End: 2023-03-31

## 2023-03-28 RX ORDER — IPRATROPIUM/ALBUTEROL SULFATE 18-103MCG
3 AEROSOL WITH ADAPTER (GRAM) INHALATION EVERY 6 HOURS
Refills: 0 | Status: COMPLETED | OUTPATIENT
Start: 2023-03-28 | End: 2023-03-29

## 2023-03-28 RX ORDER — CHLORHEXIDINE GLUCONATE 213 G/1000ML
1 SOLUTION TOPICAL DAILY
Refills: 0 | Status: DISCONTINUED | OUTPATIENT
Start: 2023-03-28 | End: 2023-03-31

## 2023-03-28 RX ORDER — VANCOMYCIN HCL 1 G
1000 VIAL (EA) INTRAVENOUS ONCE
Refills: 0 | Status: COMPLETED | OUTPATIENT
Start: 2023-03-28 | End: 2023-03-28

## 2023-03-28 RX ADMIN — PIPERACILLIN AND TAZOBACTAM 25 GRAM(S): 4; .5 INJECTION, POWDER, LYOPHILIZED, FOR SOLUTION INTRAVENOUS at 13:05

## 2023-03-28 RX ADMIN — Medication 100 MILLIGRAM(S): at 21:21

## 2023-03-28 RX ADMIN — CHLORHEXIDINE GLUCONATE 1 APPLICATION(S): 213 SOLUTION TOPICAL at 12:57

## 2023-03-28 RX ADMIN — Medication 100 MILLIGRAM(S): at 15:49

## 2023-03-28 RX ADMIN — PIPERACILLIN AND TAZOBACTAM 25 GRAM(S): 4; .5 INJECTION, POWDER, LYOPHILIZED, FOR SOLUTION INTRAVENOUS at 06:00

## 2023-03-28 RX ADMIN — Medication 3 MILLILITER(S): at 09:00

## 2023-03-28 RX ADMIN — Medication 3 MILLILITER(S): at 20:49

## 2023-03-28 RX ADMIN — PANTOPRAZOLE SODIUM 40 MILLIGRAM(S): 20 TABLET, DELAYED RELEASE ORAL at 02:30

## 2023-03-28 RX ADMIN — PIPERACILLIN AND TAZOBACTAM 25 GRAM(S): 4; .5 INJECTION, POWDER, LYOPHILIZED, FOR SOLUTION INTRAVENOUS at 21:21

## 2023-03-28 RX ADMIN — Medication 3 MILLILITER(S): at 14:00

## 2023-03-28 RX ADMIN — LOSARTAN POTASSIUM 25 MILLIGRAM(S): 100 TABLET, FILM COATED ORAL at 06:00

## 2023-03-28 RX ADMIN — GABAPENTIN 100 MILLIGRAM(S): 400 CAPSULE ORAL at 06:00

## 2023-03-28 NOTE — CHART NOTE - NSCHARTNOTEFT_GEN_A_CORE
Discussed case and patient's worsening condition with pt's nephew and HCP Zachary Malik [phone].  Pt remains full code for now. He will speak with other family members. Will come in tomorrow to see patient.

## 2023-03-28 NOTE — CHART NOTE - NSCHARTNOTEFT_GEN_A_CORE
Patient was asleep at time of visit, no family by bedside. Patient showed no non-verbal signs of pain or discomfort.  will follow for support.

## 2023-03-28 NOTE — PROGRESS NOTE ADULT - SUBJECTIVE AND OBJECTIVE BOX
Hospital day # : 3d  Events Overnight: nothing acute    Subjective: pt more drowsy today. arousable temporarily to voice. answers some questions.     Physical Exam:  General: ill appearing, lethargic elderly female  Head: atraumatic, normocephalic  Eyes: EOMI, no icterus  Lungs/Chest: Clear to auscultation bilaterally, no wheeze. +upper airway gurgling sounds  Heart: regular rate, regular rhythm, S1/S2  Abdomen: BS audible, non-distended, soft, non-tender  Extremities: no clubbing, no cyanosis, no edema  Neuro: lethargic, answers some questions oriented x2 (self and place), speech clear and fluent, moving all extremities    Recent data:  T(C): 37 (03-27-23 @ 14:01), Max: 37 (03-27-23 @ 14:01)  HR: 90 (03-28-23 @ 07:48) (90 - 91)  BP: 117/51 (03-27-23 @ 14:01) (117/51 - 117/51)  RR: 17 (03-28-23 @ 07:48) (17 - 18)  SpO2: 94% (03-28-23 @ 07:48) (94% - 94%) on 4L NC    I&O's Summary                            7.6    57.91 )-----------( 23       ( 28 Mar 2023 01:37 )             24.5                         7.8    52.17 )-----------( 26       ( 27 Mar 2023 07:43 )             26.1       28 Mar 2023 01:37    136    |  101    |  28     ----------------------------<  109    3.5     |  24     |  1.1    27 Mar 2023 07:43    138    |  103    |  23     ----------------------------<  110    3.0     |  23     |  0.9       Ca    7.5        28 Mar 2023 01:37  Ca    7.6        27 Mar 2023 07:43    Lactate Dehydrogenase, Serum: 507 (03-28-23 @ 01:37)  Lactate Dehydrogenase, Serum: 493 (03-27-23 @ 11:41)    Uric Acid, Serum: 6.1 mg/dL (03-28-23 @ 01:37)    Creatine Kinase        Assessment/Plan:  89F with h/o chronic anemia, HTN, DLD from NH p/w anemia on outpatient labwork. Initially p/w anemia seen on outpatient labs. DELANO positive (however possibly false positive i/s/o PO iron use). Has been to the ED multiple times over past several months for anemia requiring blood transfusions. Previously seen by heme-onc on prior admission, but recommended against further w/u for chronic anemia given pt's advanced age. Admitted to telemetry for ACS r/o and anemia.    IMPRESSION:  Acute Hypoxic respiratory failure on 4LNC  AMS  Severe Leukocytosis - increasing  Severe Thrombocytopenia   Macrocytic anemia - stable  Suspected heme   R/o infection, malignancy, HLH  ACS ruled out    PLAN   Neuro: avoid sedation. CT head. ABG. dc gabapentin.     HEENT: oral care    Cardiovascular: avoid overload. Trop stable. EKG non-ischemic. TTE noted EF 55% no significant valvular dysfunction. Holding Losartan.     Pulmonary: HOB at 45 degrees. aspiration precautions. CXR noted. Incentive spirometry/     Gastrointestinal: start NG tube feeds if not tolerating PO. Monitor BM. Cont PPI bid for now. If Hgb trends downtrends or any evidence of bleed, consult GI.     Renal: monitor electrolytes    ID: monitor fever and wbc. ID eval. Procalcitonin 0.44 noted. On empiric Zosyn. Dose of Vancomycin today. F/u BCX, EBV.     Endocrine: monitor FG. Insulin prn if needed    Hematology:  no blasts on peripheral smear.  DIC negative. Elevated LDH, pending haptoglobin. Olivia +. F/u Flow cytometry.  Maintain active T&S. Transfuse if hgb < 7 and plt < 10. SCD. LE duplex. F/u heme-onc.    MSK: bedrest for now    Code Status: Full code    handoff- remains acute severely ill. remains full code. f/u palliative, heme/onc, ID Hospital day # : 3d  Events Overnight: nothing acute    Subjective: pt more drowsy today. arousable temporarily to voice. answers some questions.     Physical Exam:  General: ill appearing, lethargic elderly female  Head: atraumatic, normocephalic  Eyes: EOMI, no icterus  Lungs/Chest: Clear to auscultation bilaterally, mild insp wheeze. +upper airway gurgling sounds  Heart: regular rate, regular rhythm, S1/S2  Abdomen: BS audible, non-distended, soft, non-tender  Extremities: no clubbing, no cyanosis, no edema  Neuro: lethargic, answers some questions oriented x2 (self and place), speech clear and fluent, moving all extremities    Recent data:  T(C): 37 (03-27-23 @ 14:01), Max: 37 (03-27-23 @ 14:01)  HR: 90 (03-28-23 @ 07:48) (90 - 91)  BP: 117/51 (03-27-23 @ 14:01) (117/51 - 117/51)  RR: 17 (03-28-23 @ 07:48) (17 - 18)  SpO2: 94% (03-28-23 @ 07:48) (94% - 94%) on 4L NC    I&O's Summary                            7.6    57.91 )-----------( 23       ( 28 Mar 2023 01:37 )             24.5                         7.8    52.17 )-----------( 26       ( 27 Mar 2023 07:43 )             26.1       28 Mar 2023 01:37    136    |  101    |  28     ----------------------------<  109    3.5     |  24     |  1.1    27 Mar 2023 07:43    138    |  103    |  23     ----------------------------<  110    3.0     |  23     |  0.9       Ca    7.5        28 Mar 2023 01:37  Ca    7.6        27 Mar 2023 07:43    Lactate Dehydrogenase, Serum: 507 (03-28-23 @ 01:37)  Lactate Dehydrogenase, Serum: 493 (03-27-23 @ 11:41)    Uric Acid, Serum: 6.1 mg/dL (03-28-23 @ 01:37)    Creatine Kinase        Assessment/Plan:  89F with h/o chronic anemia, HTN, DLD from NH p/w anemia on outpatient labwork. Initially p/w anemia seen on outpatient labs. DELANO positive (however possibly false positive i/s/o PO iron use). Has been to the ED multiple times over past several months for anemia requiring blood transfusions. Previously seen by heme-onc on prior admission, but recommended against further w/u for chronic anemia given pt's advanced age. Admitted to telemetry for ACS r/o and anemia.    IMPRESSION:  Acute Hypoxic respiratory failure on 4LNC   Altered mental status  Severe Leukocytosis - increasing  Severe Thrombocytopenia   Macrocytic anemia - stable  Suspected hematologic process, malignancy and HLH on differential  R/o infection  ACS ruled out    PLAN   Neuro: avoid sedation. CT head. ABG. dc gabapentin.     HEENT: oral care    Cardiovascular: avoid overload. Trop stable. EKG non-ischemic. TTE noted EF 55% no significant valvular dysfunction. Holding Losartan.     Pulmonary: HOB at 45 degrees. aspiration precautions. CXR noted. Incentive spirometry/     Gastrointestinal: start NG tube feeds if not tolerating PO. Monitor BM. Cont PPI bid for now. If Hgb trends downtrends or any evidence of bleed, consult GI.     Renal: monitor electrolytes    ID: monitor fever and wbc. ID eval. Procalcitonin 0.44 noted. On empiric Zosyn. Dose of Vancomycin today. F/u BCX, EBV.     Endocrine: monitor FG. Insulin prn if needed    Hematology:  no blasts on peripheral smear.  DIC negative. Elevated LDH, pending haptoglobin. Olivia +. F/u Flow cytometry.  Maintain active T&S. Transfuse if hgb < 7 and plt < 10. SCD. LE duplex. F/u heme-onc.    MSK: bedrest for now    Code Status: Full code    handoff- remains acute severely ill. remains full code. f/u palliative, heme/onc, ID Hospital day # : 3d  Events Overnight: nothing acute    Subjective: pt more drowsy today. arousable temporarily to voice. answers some questions.     Physical Exam:  General: ill appearing, lethargic elderly female  Head: atraumatic, normocephalic  Eyes: EOMI, no icterus  Lungs/Chest: Clear to auscultation bilaterally, mild insp wheeze. +upper airway gurgling sounds  Heart: regular rate, regular rhythm, S1/S2  Abdomen: BS audible, non-distended, soft, non-tender  Extremities: no clubbing, no cyanosis, no edema  Neuro: lethargic, answers some questions oriented x2 (self and place), speech clear and fluent, moving all extremities    Recent data:  T(C): 37 (03-27-23 @ 14:01), Max: 37 (03-27-23 @ 14:01)  HR: 90 (03-28-23 @ 07:48) (90 - 91)  BP: 117/51 (03-27-23 @ 14:01) (117/51 - 117/51)  RR: 17 (03-28-23 @ 07:48) (17 - 18)  SpO2: 94% (03-28-23 @ 07:48) (94% - 94%) on 4L NC    I&O's Summary                            7.6    57.91 )-----------( 23       ( 28 Mar 2023 01:37 )             24.5                         7.8    52.17 )-----------( 26       ( 27 Mar 2023 07:43 )             26.1       28 Mar 2023 01:37    136    |  101    |  28     ----------------------------<  109    3.5     |  24     |  1.1    27 Mar 2023 07:43    138    |  103    |  23     ----------------------------<  110    3.0     |  23     |  0.9       Ca    7.5        28 Mar 2023 01:37  Ca    7.6        27 Mar 2023 07:43    Lactate Dehydrogenase, Serum: 507 (03-28-23 @ 01:37)  Lactate Dehydrogenase, Serum: 493 (03-27-23 @ 11:41)    Uric Acid, Serum: 6.1 mg/dL (03-28-23 @ 01:37)    Creatine Kinase        Assessment/Plan:  89F with h/o chronic anemia, HTN, DLD from NH p/w anemia on outpatient labwork. Initially p/w anemia seen on outpatient labs. DELANO positive (however possibly false positive i/s/o PO iron use). Has been to the ED multiple times over past several months for anemia requiring blood transfusions. Previously seen by heme-onc on prior admission, but recommended against further w/u for chronic anemia given pt's advanced age. Admitted to telemetry for ACS r/o and anemia.    IMPRESSION:  Acute Hypoxic respiratory failure on 4LNC   Altered mental status  Severe Leukocytosis - increasing  Severe Thrombocytopenia   Macrocytic anemia - stable  Suspected hematologic process, malignancy and HLH on differential  R/o infection  ACS ruled out    PLAN   Neuro: avoid sedation. CT head. ABG. dc gabapentin.     HEENT: oral care    Cardiovascular: avoid overload. Trop stable. EKG non-ischemic. TTE noted EF 55% no significant valvular dysfunction. Holding Losartan.     Pulmonary: HOB at 45 degrees. aspiration precautions. CXR noted. ABG. Duoneb q6h. Incentive spirometry/     Gastrointestinal: start NG tube feeds if not tolerating PO. Monitor BM. Cont PPI bid for now. If Hgb trends downtrends or any evidence of bleed, consult GI.     Renal: monitor electrolytes    ID: monitor fever and wbc. ID eval. Procalcitonin 0.44 noted. On empiric Zosyn. Dose of Vancomycin today. F/u BCX, EBV.     Endocrine: monitor FG. Insulin prn if needed    Hematology:  no blasts on peripheral smear.  DIC negative. Elevated LDH, pending haptoglobin. Olivia +. F/u Flow cytometry.  Maintain active T&S. Transfuse if hgb < 7 and plt < 10. SCD. LE duplex. F/u heme-onc.    MSK: bedrest for now    Code Status: Full code    handoff- remains acute severely ill. remains full code. f/u palliative, heme/onc, ID

## 2023-03-28 NOTE — PROGRESS NOTE ADULT - ATTENDING COMMENTS
Patient seen and examined. Case discussed with resident physician, nursing staff and case management.     89F w/ h/o chronic anemia, HTN, DLD p/w anemia on outpatient lab work. Admitted to telemetry for ACS r/o and anemia.    # altered mental status with lethargy- due to respiratory failure vs due to medications    # leucocytosis- with left side shift and monocytosis- r/o related to hematologic malignancy  - no clear s/o infection  - check blood cultures- not collected; repeat order placed  - continue empiric antibiotics- on zosym  - check procal  CXR: 3/25: No radiographic evidence of acute cardiopulmonary disease.  CT angio A/P Inflammatory changes involving CBD within the pancreatic head raising a question of cholangitis. Please correlate with symptoms.  No CTA evidence of active gastrointestinal bleed.  hematology team following: - Peripheral smear: shows many monocytes, atypical lymphocytes, neutrophils and metamyelocytes; did not appreciate any blasts. Acute leukocytosis likely reactive from stress, infection and etc. Differential not showing any blasts.  - r/o bone marrow disorder like acute leukemia, MDS (unlikely)   -  flow cytometry  - repeat TLS panel    #Acute on Chronic Macrocytic Anemia  # thrombocytopenia  - r/o related to bone marrow disease  hg stable; monitor CBC, type and screen  heme onc following: pending blood work;  - Pt follows with hematologist at NH with Dr. Ayush Hilario,-obtain records  - Flow cytometry was done previously on 9/22 showed no remarkable abnormality.  - Repeat B12 and folate level- pending results  - Check LDH, haptoglobin and EMILY- pending  - c/w Procrit 40K SQ BIW (Tuesday/Friday)  - c/w Ferrous Sulfate  - pantoprazole 40mg IV BID  GI evaluated:     No evidence of GI bleed (r/o any other causes for low Hb)    No evidence of cholangitis (normal LFT, no pain, no fever)    # ACS ruled out  troponin mild stable elevation. No reported chest pain. Suspect demand ischemia i/s/o anemia  - admit to telemetry  - TTE pending    #Dyslipidemia  - c/w atorvastatin 80mg PO QHS    #Hypertension  -  restart losartan 25 mg  - monitor BP    DVT PPX: SCD  GI px- protonix    PENDING: heme/onc follow up; work up for leucocytosis and anemia; monitoring cbc . Patient seen and examined. Case discussed with resident physician, nursing staff and case management.     89F w/ h/o chronic anemia, HTN, DLD p/w anemia on outpatient lab work. Admitted to telemetry for ACS r/o and anemia.    # altered mental status with lethargy- due to respiratory failure vs due to medications  r/o sepsis  check CT head  ABG  stop gabapentin  monitor blood cultures  ID consult  continue zosyn, start on clindamycin  MRSA panel    # hypoxic respiratory failure  CXR: Stable to slightly increased right lung opacities. No pneumothorax  s/p IV lasix 40 mg once  holding IV fluids  patient with gurgling breath sounds- mostly from upper eair way; unable for suction  start on duonebs schedules        # leucocytosis- with left side shift and monocytosis- r/o related to hematologic malignancy  - no clear s/o infection  - check blood cultures- not collected; repeat order placed  - continue empiric antibiotics- on zosym  - check procal  CXR: 3/25: No radiographic evidence of acute cardiopulmonary disease.  CT angio A/P Inflammatory changes involving CBD within the pancreatic head raising a question of cholangitis. Please correlate with symptoms.  No CTA evidence of active gastrointestinal bleed.  hematology team following: - Peripheral smear: shows many monocytes, atypical lymphocytes, neutrophils and metamyelocytes; did not appreciate any blasts. Acute leukocytosis likely reactive from stress, infection and etc. Differential not showing any blasts.  - r/o bone marrow disorder like acute leukemia, MDS (unlikely)   -  flow cytometry    #Acute on Chronic Macrocytic Anemia- r/o hemolysis  # thrombocytopenia  - r/o related to bone marrow disease  stacy positive; LDH, RC high; haptoglobin pending  hematology follow up  monitor cbc  hg stable; monitor CBC, type and screen  - Pt follows with hematologist at NH with Dr. Ayush Hilario,-obtain records  - Flow cytometry was done previously on 9/22 showed no remarkable abnormality.  - Repeat B12 and folate level- pending results  - c/w Procrit 40K SQ BIW (Tuesday/Friday)  - c/w Ferrous Sulfate  - pantoprazole 40mg IV BID  GI evaluated:     No evidence of GI bleed (r/o any other causes for low Hb)    No evidence of cholangitis (normal LFT, no pain, no fever)    # ACS ruled out  troponin mild stable elevation. No reported chest pain. Suspect demand ischemia i/s/o anemia  - admit to telemetry  - TTE pending    #Dyslipidemia  - c/w atorvastatin 80mg PO QHS    #Hypertension  -  restart losartan 25 mg  - monitor BP    DVT PPX: SCD  GI px- protonix    PENDING: heme/onc follow up; CT, ABG, blood cultures, ID follow up, work up for leucocytosis and anemia; monitoring cbc .

## 2023-03-29 DIAGNOSIS — J96.01 ACUTE RESPIRATORY FAILURE WITH HYPOXIA: ICD-10-CM

## 2023-03-29 LAB
ALBUMIN SERPL ELPH-MCNC: 2.5 G/DL — LOW (ref 3.5–5.2)
ALP SERPL-CCNC: 48 U/L — SIGNIFICANT CHANGE UP (ref 30–115)
ALT FLD-CCNC: 24 U/L — SIGNIFICANT CHANGE UP (ref 0–41)
ANION GAP SERPL CALC-SCNC: 12 MMOL/L — SIGNIFICANT CHANGE UP (ref 7–14)
APPEARANCE UR: ABNORMAL
APTT BLD: 29.3 SEC — SIGNIFICANT CHANGE UP (ref 27–39.2)
AST SERPL-CCNC: 45 U/L — HIGH (ref 0–41)
BACTERIA # UR AUTO: NEGATIVE — SIGNIFICANT CHANGE UP
BASOPHILS # BLD AUTO: 0.63 K/UL — HIGH (ref 0–0.2)
BASOPHILS NFR BLD AUTO: 0.9 % — SIGNIFICANT CHANGE UP (ref 0–1)
BILIRUB SERPL-MCNC: 0.6 MG/DL — SIGNIFICANT CHANGE UP (ref 0.2–1.2)
BILIRUB UR-MCNC: NEGATIVE — SIGNIFICANT CHANGE UP
BUN SERPL-MCNC: 42 MG/DL — HIGH (ref 10–20)
CALCIUM SERPL-MCNC: 7.1 MG/DL — LOW (ref 8.4–10.5)
CHLORIDE SERPL-SCNC: 101 MMOL/L — SIGNIFICANT CHANGE UP (ref 98–110)
CO2 SERPL-SCNC: 27 MMOL/L — SIGNIFICANT CHANGE UP (ref 17–32)
COLOR SPEC: YELLOW — SIGNIFICANT CHANGE UP
COMMENT - URINE: SIGNIFICANT CHANGE UP
CREAT SERPL-MCNC: 1.4 MG/DL — SIGNIFICANT CHANGE UP (ref 0.7–1.5)
CRP SERPL-MCNC: 112.2 MG/L — HIGH
DIFF PNL FLD: ABNORMAL
EGFR: 34 ML/MIN/1.73M2 — LOW
EOSINOPHIL # BLD AUTO: 0.59 K/UL — SIGNIFICANT CHANGE UP (ref 0–0.7)
EOSINOPHIL NFR BLD AUTO: 0.9 % — SIGNIFICANT CHANGE UP (ref 0–8)
EPI CELLS # UR: 3 /HPF — SIGNIFICANT CHANGE UP (ref 0–5)
ERYTHROCYTE [SEDIMENTATION RATE] IN BLOOD: 61 MM/HR — HIGH (ref 0–20)
GLUCOSE SERPL-MCNC: 103 MG/DL — HIGH (ref 70–99)
GLUCOSE UR QL: NEGATIVE — SIGNIFICANT CHANGE UP
HCT VFR BLD CALC: 24.4 % — LOW (ref 37–47)
HGB BLD-MCNC: 7.3 G/DL — LOW (ref 12–16)
HYALINE CASTS # UR AUTO: 0 /LPF — SIGNIFICANT CHANGE UP (ref 0–7)
IMM GRANULOCYTES NFR BLD AUTO: 5.8 % — HIGH (ref 0.1–0.3)
INR BLD: 1.2 RATIO — SIGNIFICANT CHANGE UP (ref 0.65–1.3)
KETONES UR-MCNC: NEGATIVE — SIGNIFICANT CHANGE UP
LDH SERPL L TO P-CCNC: 506 — HIGH (ref 50–242)
LEUKOCYTE ESTERASE UR-ACNC: ABNORMAL
LYMPHOCYTES # BLD AUTO: 14.21 K/UL — HIGH (ref 1.2–3.4)
LYMPHOCYTES # BLD AUTO: 20.5 % — SIGNIFICANT CHANGE UP (ref 20.5–51.1)
MAGNESIUM SERPL-MCNC: 2 MG/DL — SIGNIFICANT CHANGE UP (ref 1.8–2.4)
MCHC RBC-ENTMCNC: 29.9 G/DL — LOW (ref 32–37)
MCHC RBC-ENTMCNC: 30.3 PG — SIGNIFICANT CHANGE UP (ref 27–31)
MCV RBC AUTO: 101.2 FL — HIGH (ref 81–99)
MONOCYTES # BLD AUTO: 39.96 K/UL — HIGH (ref 0.1–0.6)
MONOCYTES NFR BLD AUTO: 57.7 % — HIGH (ref 1.7–9.3)
NEUTROPHILS # BLD AUTO: 9.8 K/UL — HIGH (ref 1.4–6.5)
NEUTROPHILS NFR BLD AUTO: 14.2 % — LOW (ref 42.2–75.2)
NITRITE UR-MCNC: NEGATIVE — SIGNIFICANT CHANGE UP
NRBC # BLD: 2 /100 WBCS — HIGH (ref 0–0)
PH UR: 6 — SIGNIFICANT CHANGE UP (ref 5–8)
PHOSPHATE SERPL-MCNC: 6.3 MG/DL — HIGH (ref 2.1–4.9)
PLATELET # BLD AUTO: 37 K/UL — LOW (ref 130–400)
POTASSIUM SERPL-MCNC: 3 MMOL/L — LOW (ref 3.5–5)
POTASSIUM SERPL-SCNC: 3 MMOL/L — LOW (ref 3.5–5)
PROCALCITONIN SERPL-MCNC: 0.81 NG/ML — HIGH (ref 0.02–0.1)
PROT SERPL-MCNC: 5.6 G/DL — LOW (ref 6–8)
PROT UR-MCNC: ABNORMAL
PROTHROM AB SERPL-ACNC: 13.7 SEC — HIGH (ref 9.95–12.87)
RBC # BLD: 2.41 M/UL — LOW (ref 4.2–5.4)
RBC # FLD: 24.9 % — HIGH (ref 11.5–14.5)
RBC CASTS # UR COMP ASSIST: 46 /HPF — HIGH (ref 0–4)
SODIUM SERPL-SCNC: 140 MMOL/L — SIGNIFICANT CHANGE UP (ref 135–146)
SP GR SPEC: 1.02 — SIGNIFICANT CHANGE UP (ref 1.01–1.03)
TM INTERPRETATION: SIGNIFICANT CHANGE UP
URATE CRY FLD QL MICRO: ABNORMAL
URATE SERPL-MCNC: 7.3 MG/DL — HIGH (ref 2.5–7)
UROBILINOGEN FLD QL: SIGNIFICANT CHANGE UP
WBC # BLD: 69.22 K/UL — CRITICAL HIGH (ref 4.8–10.8)
WBC # FLD AUTO: 69.22 K/UL — CRITICAL HIGH (ref 4.8–10.8)
WBC UR QL: 4 /HPF — SIGNIFICANT CHANGE UP (ref 0–5)

## 2023-03-29 PROCEDURE — 99232 SBSQ HOSP IP/OBS MODERATE 35: CPT

## 2023-03-29 PROCEDURE — 99233 SBSQ HOSP IP/OBS HIGH 50: CPT

## 2023-03-29 PROCEDURE — 99497 ADVNCD CARE PLAN 30 MIN: CPT

## 2023-03-29 RX ORDER — ALLOPURINOL 300 MG
200 TABLET ORAL DAILY
Refills: 0 | Status: DISCONTINUED | OUTPATIENT
Start: 2023-03-29 | End: 2023-03-29

## 2023-03-29 RX ORDER — HYDROMORPHONE HYDROCHLORIDE 2 MG/ML
0.5 INJECTION INTRAMUSCULAR; INTRAVENOUS; SUBCUTANEOUS
Refills: 0 | Status: DISCONTINUED | OUTPATIENT
Start: 2023-03-29 | End: 2023-03-31

## 2023-03-29 RX ORDER — POTASSIUM CHLORIDE 20 MEQ
20 PACKET (EA) ORAL ONCE
Refills: 0 | Status: COMPLETED | OUTPATIENT
Start: 2023-03-29 | End: 2023-03-29

## 2023-03-29 RX ORDER — POTASSIUM CHLORIDE 20 MEQ
40 PACKET (EA) ORAL ONCE
Refills: 0 | Status: COMPLETED | OUTPATIENT
Start: 2023-03-29 | End: 2023-03-29

## 2023-03-29 RX ORDER — ALLOPURINOL 300 MG
100 TABLET ORAL DAILY
Refills: 0 | Status: DISCONTINUED | OUTPATIENT
Start: 2023-03-29 | End: 2023-03-31

## 2023-03-29 RX ORDER — ROBINUL 0.2 MG/ML
0.2 INJECTION INTRAMUSCULAR; INTRAVENOUS
Refills: 0 | Status: DISCONTINUED | OUTPATIENT
Start: 2023-03-29 | End: 2023-03-31

## 2023-03-29 RX ADMIN — Medication 100 MILLIGRAM(S): at 05:27

## 2023-03-29 RX ADMIN — CHLORHEXIDINE GLUCONATE 1 APPLICATION(S): 213 SOLUTION TOPICAL at 11:23

## 2023-03-29 RX ADMIN — Medication 50 MILLIEQUIVALENT(S): at 11:22

## 2023-03-29 RX ADMIN — PIPERACILLIN AND TAZOBACTAM 25 GRAM(S): 4; .5 INJECTION, POWDER, LYOPHILIZED, FOR SOLUTION INTRAVENOUS at 05:27

## 2023-03-29 NOTE — PROGRESS NOTE ADULT - ATTENDING COMMENTS
patient seen and examined early this morning and note written later in the day   -agree with medical resident's note unless otherwise stated (patient is now CMO)    Vital Signs Last 24 Hrs  T(C): 36.1 (29 Mar 2023 13:08), Max: 36.1 (29 Mar 2023 04:45)  T(F): 97 (29 Mar 2023 13:08), Max: 97 (29 Mar 2023 04:45)  HR: 70 (29 Mar 2023 13:08) (70 - 79)  BP: 132/61 (29 Mar 2023 13:08) (85/64 - 132/61)  BP(mean): --  RR: 19 (29 Mar 2023 13:08) (18 - 19)  SpO2: 99% (29 Mar 2023 10:45) (95% - 99%)    Parameters below as of 29 Mar 2023 10:45  Patient On (Oxygen Delivery Method): nasal cannula  O2 Flow (L/min): 3                          7.3    69.22 )-----------( 37       ( 29 Mar 2023 07:19 )             24.4     03-29    140  |  101  |  42<H>  ----------------------------<  103<H>  3.0<L>   |  27  |  1.4    Ca    7.1<L>      29 Mar 2023 07:19  Phos  6.3     03-29  Mg     2.0     03-29    TPro  5.6<L>  /  Alb  2.5<L>  /  TBili  0.6  /  DBili  x   /  AST  45<H>  /  ALT  24  /  AlkPhos  48  03-29    # CMO status   # Advanced illness - Metabolic encephalopathy; Leukocytosis, AHRF  # Advanced Age   # Electrolyte abnormality   # Stage II Sacral Decubiti POA     -patient seen by Palliative care team - CMO status (DNR/DNI) - family aware of the overall grave prognosis   -NPO this am - failed S/S eval 3/28/23   -skin care as per nursing protocol - frequent turning and positioning - with offloading     DISPO: unsure ? home hospice - will follow with CM   -staff updated family     Attending Physician Dr. Chayo Harris # [id number]

## 2023-03-29 NOTE — PROGRESS NOTE ADULT - ASSESSMENT
89 year old woman with history of HTN, anemia, DLD presents with anemia from NH. Hospital course complicated by evidence of acute on chronic anemia, thrombocytopenia, acute leukocytosis. Palliative care consulted for GOC.    Patient is now comfort measures only    MEDD (morphine equivalent daily dose):    Education about palliative care provided to patient/family.  See Recs below.    Please call [phone] with questions or concerns 24/7.   We will continue to follow.

## 2023-03-29 NOTE — SWALLOW BEDSIDE ASSESSMENT ADULT - COMMENTS
89F w/ h/o chronic anemia, HTN, DLD p/w anemia. Pt is from [facility]. Had outpatient labwork, which showed WBC 44.8, Hb 6.3, PLT 35, and Ferritin >1500admitted to telemetry for ACS r/o and anemia. CTA AP demonstrated no evidence of active GI bleed, but did show inflammatory changes involving CBD within the pancreatic head raising a   question of cholangitis.
+elevated WBC to 69.22  As per palliative care note: Pt. made CMO today.

## 2023-03-29 NOTE — PROGRESS NOTE ADULT - SUBJECTIVE AND OBJECTIVE BOX
HPI:  89F w/ h/o chronic anemia, HTN, DLD p/w anemia. Pt is from [facility]. Had outpatient labwork, which showed WBC 44.8, Hb 6.3, PLT 35, and Ferritin >1500. No reported fevers, chills, CP, palpitations, SOB, abdominal pain, n/v/d, dysuria, or LE swelling.    In ED, pt HD stable on vitals. DELANO positive for melena. Labs significant for WBC 62.16, Hb 6.9, PLT 35, BNP 2164, Tn 0.03, and Lactate 0.9. CTA AP demonstrated no evidence of active GI bleed, but did show inflammatory changes involving CBD within the pancreatic head raising a   question of cholangitis. S/P 1u pRBC, pantoprazole 40mg IV x1, and Lasix 20mg IV x1. Subsequently admitted to telemetry for ACS r/o and anemia. (25 Mar 2023 18:18)    Interval histoyr  -Patinet with clinical decline over 24-48 hours  -Some increased work of breathing, but unable to participate in exam  -Patient's HCP/nephew Zachary is at bedside    ADVANCE DIRECTIVES:    [ ] Full Code [x ] DNR  MOLST  [ ]  Living Will  [ ]   DECISION MAKER(s):  [ x] Health Care Proxy(s)  [ ] Surrogate(s)  [ ] Guardian           Name(s): Phone Number(s): Nephedinson Malik [phone]    BASELINE (I)ADL(s) (prior to admission):  [place]: [ ]Total  [ x] Moderate [ ]Dependent  Palliative Performance Status Version 2:         50%    http://npcrc.org/files/news/palliative_performance_scale_ppsv2.pdf    Allergies    sulfa drugs (Other)    Intolerances    MEDICATIONS  (STANDING):  allopurinol 100 milliGRAM(s) Oral daily  atorvastatin 80 milliGRAM(s) Oral at bedtime  calcium carbonate    500 mG (Tums) Chewable 1 Tablet(s) Chew daily  chlorhexidine 2% Cloths 1 Application(s) Topical daily  ferrous    sulfate 325 milliGRAM(s) Oral daily  losartan 25 milliGRAM(s) Oral daily  melatonin 5 milliGRAM(s) Oral at bedtime  multivitamin 1 Tablet(s) Oral daily  oxybutynin 5 milliGRAM(s) Oral daily  pantoprazole    Tablet 40 milliGRAM(s) Oral two times a day  polyethylene glycol 3350 17 Gram(s) Oral daily  senna 2 Tablet(s) Oral at bedtime    MEDICATIONS  (PRN):  acetaminophen     Tablet .. 650 milliGRAM(s) Oral every 6 hours PRN Temp greater or equal to 38C (100.4F), Mild Pain (1 - 3)  glycopyrrolate Injectable 0.2 milliGRAM(s) IV Push four times a day PRN Secretions  HYDROmorphone  Injectable 0.5 milliGRAM(s) IV Push every 2 hours PRN Moderate pain (4-6), Severe pain (7-10), Respiratory rate greater than 22  LORazepam   Injectable 0.5 milliGRAM(s) IV Push every 4 hours PRN Anxiety      PRESENT SYMPTOMS: [ x]Unable to obtain due to poor mentation   Source if other than patient:  [ ]Family   [ ]Team     Pain: [ ]yes [ ]no  QOL impact -   Location -                    Aggravating factors -  Quality -  Radiation -  Timing-  Severity (0-10 scale):  Minimal acceptable level (0-10 scale):     CPOT:  0  https://www.sccm.org/getattachment/[email or web address]-8s1m-7d4l-7009s8038q0h/Critical-Care-Pain-Observation-Tool-(CPOT)    PAIN AD Score:   http://geriatrictoolkit.[email or web address]/cog/painad.pdf (press ctrl +  left click to view)    Dyspnea:                           [x ]None[ ]Mild [ ]Moderate [ ]Severe     Respiratory Distress Observation Scale (RDOS): 3  A score of 0 to 2 signifies little or no respiratory distress, 3 signifies mild distress, scores 4 to 6 indicate moderate distress, and scores greater than 7 signify severe distress  [email or web address]/[email or web address]/files/PDFS/528862-kcanxjmvofb-qpnuemfa-bvsgdtdzbav-pytnd.pdf    Anxiety:                             [ ]None[ ]Mild [ ]Moderate [ ]Severe   Fatigue:                             []None[ ]Mild [ ]Moderate [ ]Severe   Nausea:                            []None[ ]Mild [ ]Moderate [ ]Severe   Loss of appetite:              [ ]None[ ]Mild [ ]Moderate [ ]Severe   Constipation:                    [ ]None[ ]Mild [ ]Moderate [ ]Severe    Other Symptoms:  [ ]All other review of systems negative     Palliative Performance Status Version 2:        20%    http://npcrc.org/files/news/palliative_performance_scale_ppsv2.pdf    PHYSICAL EXAM:  Vital Signs Last 24 Hrs  T(C): 36.1 (29 Mar 2023 13:08), Max: 36.1 (29 Mar 2023 04:45)  T(F): 97 (29 Mar 2023 13:08), Max: 97 (29 Mar 2023 04:45)  HR: 70 (29 Mar 2023 13:08) (70 - 88)  BP: 132/61 (29 Mar 2023 13:08) (85/64 - 132/61)  BP(mean): --  RR: 19 (29 Mar 2023 13:08) (18 - 19)  SpO2: 99% (29 Mar 2023 10:45) (95% - 99%)    Parameters below as of 29 Mar 2023 10:45  Patient On (Oxygen Delivery Method): nasal cannula  O2 Flow (L/min): 3        GENERAL:  [ ] No acute distress [ ]Lethargic  [x ]Unarousable  [ ]Verbal  [ ]Non-Verbal [ ]Cachexia    BEHAVIORAL/PSYCH:  [ ]Alert and Oriented x3  [ ] Anxiety [ ] Delirium [ ] Agitation [ x] Calm   EYES: [x ] No scleral icterus [ ] Scleral icterus [x ] Closed  ENMT:  [ ]Dry mouth  [x ]No external oral lesions [ ] No external ear or nose lesions  CARDIOVASCULAR:  [ ]Regular [ ]Irregular [ ]Tachy [x ]Not Tachy  [ ]Nima [ ] Edema [ ] No edema  PULMONARY:  [ ]Tachypnea  [ ]Audible excessive secretions [ ] No labored breathing [x ] mildly labored breathing  GASTROINTESTINAL: [ ]Soft  [ ]Distended  [ x]Not distended [ ]Non tender [ ]Tender  MUSCULOSKELETAL: [x ]No clubbing [ ] clubbing  [ ] No cyanosis [ ] cyanosis  NEUROLOGIC: [ ]No focal deficits  [ ]Follows commands  [x ]Does not follow commands  [ ]Cognitive impairment  [ ]Dysphagia  [ ]Dysarthria  [ ]Paresis [x] Hard of hearing  SKIN: [ ] Jaundiced [x ] Non-jaundiced [ ]Rash [ ]No Rash [ ] Warm [ ] Dry  MISC/LINES: [ ] ET tube [ ] Trach [ ]NGT/OGT [ ]PEG [ ]Iros    LABS: reviewed by me                          7.3    69.22 )-----------( 37       ( 29 Mar 2023 07:19 )             24.4           140  |  101  |  42<H>  ----------------------------<  103<H>  3.0<L>   |  27  |  1.4    Ca    7.1<L>      29 Mar 2023 07:19  Phos  6.3       Mg     2.0         TPro  5.6<L>  /  Alb  2.5<L>  /  TBili  0.6  /  DBili  x   /  AST  45<H>  /  ALT  24  /  AlkPhos  48            ABG - ( 28 Mar 2023 12:25 )  pH, Arterial: 7.28  pH, Blood: x     /  pCO2: 58    /  pO2: 75    / HCO3: 27    / Base Excess: 0.3   /  SaO2: 96.3                Urinalysis Basic - ( 29 Mar 2023 10:40 )    Color: Yellow / Appearance: Turbid / S.022 / pH: x  Gluc: x / Ketone: Negative  / Bili: Negative / Urobili: <2 mg/dL   Blood: x / Protein: 100 mg/dL / Nitrite: Negative   Leuk Esterase: Small / RBC: 46 /HPF / WBC 4 /HPF   Sq Epi: x / Non Sq Epi: 3 /HPF / Bacteria: Negative        PT/INR - ( 29 Mar 2023 07:19 )   PT: 13.70 sec;   INR: 1.20 ratio         PTT - ( 29 Mar 2023 07:19 )  PTT:29.3 sec          CAPILLARY BLOOD GLUCOSE                  RADIOLOGY & ADDITIONAL STUDIES: reviewed by me  < from: [facility] Duplex Lower Ext Vein Scan, Bilat (23 @ 18:48) >    IMPRESSION:  No evidence of deep venous thrombosis in eitherlower extremity.  left peroneal vein is not visualized      < end of copied text >      EKG: reviewed by me  < from: 12 Lead ECG (23 @ 08:35) >    Ventricular Rate 86 BPM    Atrial Rate 86 BPM    P-R Interval 166 ms    QRS Duration 90 ms    Q-T Interval 352 ms    QTC Calculation(Bazett) 421 ms    P Axis 42 degrees    R Axis -34 degrees    T Axis -5 degrees    Diagnosis Line Sinus rhythm with Premature atrial complexes  Left axis deviation  Abnormal ECG    < end of copied text >      PROTEIN CALORIE MALNUTRITION PRESENT: [ ]mild [ ]moderate [ ]severe [ ]underweight [ ]morbid obesity  https://www.andeal.org/vault/5870/web/files/ONC/Table_Clinical%20Characteristics%20to%20Document%20Malnutrition-White%20JV%20et%20al%2020.pdf    Height (cm): 154.9 (23 @ 20:51), 154.9 (23 @ 20:34), 154.9 (23 @ 20:17)  Weight (kg): 54.4 (23 @ 20:51), 52.2 (23 @ 19:41), 59 (22 @ 19:18)  BMI (kg/m2): 22.7 (23 @ 20:51), 21.8 (23 @ 20:34), 21.8 (23 @ 20:17)    [ ]PPSV2 < or = to 30% [ ]significant weight loss  [ ]poor nutritional intake  [ ]anasarca      [ ]Artificial Nutrition      REFERRALS:   [x ]Chaplaincy  [ ]Hospice  [ ]Child Life  [ x]Social Work  [ ]Case management [ ]Holistic Therapy     Patient discussed with primary medical team MD  Palliative care education provided to patient and/or family    Goals of Care Document:

## 2023-03-29 NOTE — PROGRESS NOTE ADULT - SUBJECTIVE AND OBJECTIVE BOX
Hospital day # : 4d  Events Overnight: none    Subjective: pt seems more awake today responding appropriately to some questions.    Physical Exam:  General: ill appearing elderly female, non-toxic appearing  Head: atraumatic, normocephalic  Eyes: EOMI, no icterus  Lungs/Chest: Clear to auscultation bilaterally, no wheeze  Heart: regular rate, regular rhythm, S1/S2  Abdomen: BS audible, non-distended, soft, non-tender  Extremities: no clubbing, no cyanosis, no edema  Neuro: answers some questions oriented x2 (self and place), speech clear and fluent, moving all extremities    Recent data:  OBJECTIVE DATA:    T(C): 36.1 (03-29-23 @ 04:45), Max: 36.6 (03-28-23 @ 12:01)  T(F): 97 (03-29-23 @ 04:45), Max: 97.9 (03-28-23 @ 12:01)  HR: 79 (03-29-23 @ 05:11) (76 - 88)  BP: 104/52 (03-29-23 @ 05:11) (85/64 - 105/53)  ABP: --  ABP(mean): --  RR: 18 (03-29-23 @ 10:45) (18 - 19)  SpO2: 99% (03-29-23 @ 10:45) (95% - 99%)      I&O's Summary    28 Mar 2023 07:01  -  29 Mar 2023 07:00  --------------------------------------------------------  IN: 0 mL / OUT: 500 mL / NET: -500 mL                              7.3    69.22 )-----------( 37       ( 29 Mar 2023 07:19 )             24.4       03-29    140  |  101  |  42<H>  ----------------------------<  103<H>  3.0<L>   |  27  |  1.4    Ca    7.1<L>      29 Mar 2023 07:19  Phos  6.3     03-29  Mg     2.0     03-29    TPro  5.6<L>  /  Alb  2.5<L>  /  TBili  0.6  /  DBili  x   /  AST  45<H>  /  ALT  24  /  AlkPhos  48  03-29          ABG - ( 28 Mar 2023 12:25 )  pH, Arterial: 7.28  pH, Blood: x     /  pCO2: 58    /  pO2: 75    / HCO3: 27    / Base Excess: 0.3   /  SaO2: 96.3                  Assessment/Plan:  89F with h/o chronic anemia, HTN, DLD from NH p/w anemia on outpatient labwork. Initially p/w anemia seen on outpatient labs. DELANO positive (however possibly false positive i/s/o PO iron use). Has been to the ED multiple times over past several months for anemia requiring blood transfusions. Previously seen by heme-onc on prior admission, but recommended against further w/u for chronic anemia given pt's advanced age. Admitted to telemetry for ACS r/o and anemia.    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 CT head negative. ABG noted. gabapentin dced.     HEENT: oral care    Cardiovascular: avoid overload. Trop stable. EKG non-ischemic. TTE noted EF 55% no significant valvular dysfunction.   Plan: resume Losartan if bp rises    Pulmonary: HOB at 45 degrees. aspiration precautions. CXR noted. ABG.   Plan: BIPAP qhs and wean O2 as tolerated. Duoneb prn. Incentive spirometry    Gastrointestinal: start NG tube feeds if not tolerating PO. Monitor BM. Cont PPI bid for now. If Hgb trends downtrends or any evidence of bleed, consult GI.     Renal: monitor electrolytes    ID: w/u: BCX x2 negative. chest-xray stable. UA pending. Procal 0.44.  Plan: On empiric Zosyn. Follow up ID.    Endocrine: monitor FG. Insulin prn if needed    Hematology:  w/u thus far: no blasts on peripheral smear.  DIC negative. Elevated LDH, haptoglobin nl. Olivia +. Uric acid and phosphorous elevated.    F/u Flow cytometry. F/u heme-onc.  Plan: Maintain active T&S. Transfuse if hgb < 7 and plt < 10. SCD for now until plt > 50k. LE duplex.   Start Allopurinol 200mg daily. IV hydration as needed. Repeat TLS and hemolysis labs.     MSK: IAT    Code Status: Full code    handoff- remains acute severely ill. remains full code. f/u palliative, heme/onc, ID Hospital day # : 5d  Events Overnight: none    Subjective: pt seems more awake today responding appropriately to some questions.    Physical Exam:  General: ill appearing elderly female, non-toxic appearing  Head: atraumatic, normocephalic  Eyes: EOMI, no icterus  Lungs/Chest: Clear to auscultation bilaterally, no wheeze  Heart: regular rate, regular rhythm, S1/S2  Abdomen: BS audible, non-distended, soft, non-tender  Extremities: no clubbing, no cyanosis, no edema  Neuro: answers some questions oriented x1-2 (self and place), speech clear and fluent, moving all extremities    Recent data:  OBJECTIVE DATA:    T(C): 36.1 (03-29-23 @ 04:45), Max: 36.6 (03-28-23 @ 12:01)  T(F): 97 (03-29-23 @ 04:45), Max: 97.9 (03-28-23 @ 12:01)  HR: 79 (03-29-23 @ 05:11) (76 - 88)  BP: 104/52 (03-29-23 @ 05:11) (85/64 - 105/53)  ABP: --  ABP(mean): --  RR: 18 (03-29-23 @ 10:45) (18 - 19)  SpO2: 99% (03-29-23 @ 10:45) (95% - 99%)      I&O's Summary    28 Mar 2023 07:01  -  29 Mar 2023 07:00  --------------------------------------------------------  IN: 0 mL / OUT: 500 mL / NET: -500 mL                              7.3    69.22 )-----------( 37       ( 29 Mar 2023 07:19 )             24.4       03-29    140  |  101  |  42<H>  ----------------------------<  103<H>  3.0<L>   |  27  |  1.4    Ca    7.1<L>      29 Mar 2023 07:19  Phos  6.3     03-29  Mg     2.0     03-29    TPro  5.6<L>  /  Alb  2.5<L>  /  TBili  0.6  /  DBili  x   /  AST  45<H>  /  ALT  24  /  AlkPhos  48  03-29          ABG - ( 28 Mar 2023 12:25 )  pH, Arterial: 7.28  pH, Blood: x     /  pCO2: 58    /  pO2: 75    / HCO3: 27    / Base Excess: 0.3   /  SaO2: 96.3                  Assessment/Plan:  89F with h/o chronic anemia, HTN, DLD from NH p/w anemia on outpatient labwork. Initially p/w anemia seen on outpatient labs. DELANO positive (however possibly false positive i/s/o PO iron use). Has been to the ED multiple times over past several months for anemia requiring blood transfusions. Previously seen by heme-onc on prior admission, but recommended against further w/u for chronic anemia given pt's advanced age. Admitted to telemetry for ACS r/o and anemia.    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 CT head negative. ABG noted. gabapentin dced.     HEENT: oral care    Cardiovascular: avoid overload. Trop stable. EKG non-ischemic. TTE noted EF 55% no significant valvular dysfunction.   Plan: resume Losartan if bp rises    Pulmonary: HOB at 45 degrees. aspiration precautions. CXR noted. ABG.   Plan: BIPAP qhs and wean O2 as tolerated. Duoneb prn. Incentive spirometry    Gastrointestinal: start NG tube feeds if not tolerating PO. Monitor BM. Cont PPI bid for now. If Hgb trends downtrends or any evidence of bleed, consult GI.     Renal: monitor electrolytes    ID: w/u: BCX x2 negative. chest-xray stable. UA pending. Procal 0.44.  Plan: On empiric Zosyn. Follow up ID.    Endocrine: monitor FG. Insulin prn if needed    Hematology:  w/u thus far: no blasts on peripheral smear.  DIC negative. Elevated LDH, haptoglobin nl. Olivia +. Uric acid and phosphorous elevated.    F/u Flow cytometry. F/u heme-onc.  Plan: Maintain active T&S. Transfuse if hgb < 7 and plt < 10. SCD for now until plt > 50k. LE duplex.   Start Allopurinol 200mg daily. IV hydration as needed. Repeat TLS and hemolysis labs.     MSK: IAT    Code Status: Full code as per nephew (HCP) and niece and pt  Plan: Follow up palliative care for continued GOC

## 2023-03-29 NOTE — CHART NOTE - NSCHARTNOTEFT_GEN_A_CORE
met with patient earlier today. patient encountered resting in bed. appeared uncomfortable. awake however, only able to verbalize her name. spoke with bedside RN vitals were checked per RN. discussed case with palliative MD. will follow. [phone]

## 2023-03-29 NOTE — SWALLOW BEDSIDE ASSESSMENT ADULT - NS ASR SWALLOW FINDINGS DISCUS
RN Shy , RN Silva, Dr. Chavez/Physician/Nursing/Patient
RN Shy , RN Silva, Dr. Chavez/Physician/Nursing/Patient

## 2023-03-29 NOTE — CHART NOTE - NSCHARTNOTEFT_GEN_A_CORE
PALLIATIVE MEDICINE INTERDISCIPLINARY TEAM NOTE    Provider:  [   ]Social Work   [   ]          [   ] Initial visit [   ] Follow up    Family or contact name / phone #   Met with: [   ] Patient  [   ] Family  [   ] Other:    Primary Language: [   ] English [   ] Other*:                      *Interpretation provided by:    SUPPORT DIAGNOSES            (Check all that apply)  [   ] Psychosocial spiritual assessment (PSSA)  [   ] EOL issues  [   ] Cultural / spiritual concerns  [   ] Pain / suffering  [   ] Dementia / AMS  [   ] Other:  [   ] AD issues  [   ] Grief / loss / sadness  [   ] Discharge issues  [   ] Distress / coping    PSYCHOSOCIAL ASSESSMENT OF PATIENT         (Check all that apply)  [   ] Initial Assessment            [   ] Reassessment          [   ] Not Applicable this visit    Pain/suffering acuity:  [   ] None to mild (0-3)           [   ] Moderate (4-6)        [   ] High (7-10)    Mental Status:  [   ] Alert/oriented (x3)          [   ] Confused/Altered(x2/x1)         [   ] Non-resp    Functional status:  [   ] Independent w ADLs      [   ] Needs Assistance             [   ] Bedbound/Full Care    Coping:  [   ] Coping well                     [   ] Coping w/difficulty            [   ] Poor coping    Support system:  [   ] Strong                              [   ] Adequate                        [   ] Inadequate    SPIRITUAL ASSESSMENT  Moravian/Spiritual practice: ____Catholic_______________________    Role of organized Judaism:  [   ] Important                     [ x  ] Some (fam tradition, cultural)               [   ] None    Effects on medical care:  [   ] Yes, _____________________________________                         [ x  ] None    Cultural/Confucianism need:  [   ] Yes, _____________________________________                         [  x ] None    Refer to Pastoral Care:  [   ] Yes           [ x  ] No, not at this time    SERVICE PROVIDED  [   ]PSSA                                                                             [   ]Discharge support / facilitation  [   ]AD / goals of care counseling                                  [   ]EOL / death / bereavement counseling  [   ]Counseling / support                                                [   ] Family meeting  [ x  ]Prayer / sacrament / ritual                                      [   ] Referral   [   ]Other                                                                       NOTE and Plan of Care (PoC): Pt looked comfortable. Family members were present at bedside. Pt was made CMO. I provided spiritual support to Pt and family. I will follow up as needed.

## 2023-03-29 NOTE — SWALLOW BEDSIDE ASSESSMENT ADULT - SLP GENERAL OBSERVATIONS
Pt. received awake, +generalized weakness, min verbalizations, 2L NC,
Pt asleep, having BIPAP mask placed by RRT , family received at bedside.

## 2023-03-29 NOTE — CONSULT NOTE ADULT - TIME BILLING
89F w/ h/o chronic anemia, HTN, DLD p/w anemia. Pt is from [facility]. Had outpatient labwork,     PROBLEMS  #Leukocytosis/Anemia/Thrombocytopenia with Olivia positive   #Possible TLS   #Abdominal Pain   - CT Angio Abdomen and Pelvis w/ IV Cont (03.25.23 @ 04:41): Inflammatory changes involving CBD within the pancreatic head raising a  question of cholangitis. Please correlate with symptoms. No CTA evidence of active gastrointestinal bleed. Otherwise, no evidence of acute abdominal or pelvic pathology.  - LFTs mostly normal     #Hypoxic respiratory failure  #Toxic Metabolic Encephalopathy     RECOMMENDATIONS  - No clear infectious source -- CT imaging with possible inflammatory changes by CBD - LFTs are normal, but noted grimacing on RUQ palpation; Reviewed heme notes and no obvious findings on peripheral smear; although labs suggestive of TLS   - continue zosyn for now -- consider RUQ US to evaluate CBD although discussed with family that likely poor prognosis   - follow-up flow cytometry  - GOC discussion    Please call or message on Microsoft Teams if with any questions.  Spectra [id number]
70 minutes spent on total encounter; more than 50% of the visit was spent counseling and / or coordinating care by the attending physician.  The necessity of the time spent during the encounter on this date of service was due to: Coordination of care.

## 2023-03-29 NOTE — CONSULT NOTE ADULT - ASSESSMENT
ASSESSMENT  89F w/ h/o chronic anemia, HTN, DLD p/w anemia. Pt is from [facility]. Had outpatient labwork, which showed WBC 44.8, Hb 6.3, PLT 35, and Ferritin >1500. No reported fevers, chills, CP, palpitations, SOB, abdominal pain, n/v/d, dysuria, or LE swelling. DELANO positive for melena. Labs significant for WBC 62.16, Hb 6.9, PLT 35, BNP 2164, Tn 0.03, and Lactate 0.9. CTA AP demonstrated no evidence of active GI bleed, but did show inflammatory changes involving CBD within the pancreatic head raising a   question of cholangitis; per GI no evidence of cholangitis. Admitted to telemetry for ACS r/o and anemia.         PROBLEMS  #leukocytosis  - no clear signs of infection  - no sepsis  - f/u procal  - no cholangitis per GI  - r/o bone marrow disorder; f/u heme onc    #hypoxic respiratory failure  #AMS  - no sepsis  - CXR - slightly increase right lung opacities  - currently on zosyn and clindamycin  - f/u BCx, MRSA  - repeat RVP    RECOMMENDATIONS  - f/u pending cultures  - ***     ASSESSMENT  89F w/ h/o chronic anemia, HTN, DLD p/w anemia. Pt is from [facility]. Had outpatient labwork, which showed WBC 44.8, Hb 6.3, PLT 35, and Ferritin >1500. No reported fevers, chills, CP, palpitations, SOB, abdominal pain, n/v/d, dysuria, or LE swelling. DELANO positive for melena. Labs significant for WBC 62.16, Hb 6.9, PLT 35, BNP 2164, Tn 0.03, and Lactate 0.9. CTA AP demonstrated no evidence of active GI bleed, but did show inflammatory changes involving CBD within the pancreatic head raising a question of cholangitis; per GI no evidence of cholangitis. Admitted to telemetry for ACS r/o and anemia.     PROBLEMS  #leukocytosis  #hypoxic respiratory failure  #AMS  - no clear signs or source of infection  - no sepsis  - no cholangitis per GI  - CXR - slightly increase right lung opacities  - currently on zosyn and clindamycin    RECOMMENDATIONS  - f/u pending cultures  - f/u procal  - r/o bone marrow disorder; f/u heme onc  - goals of care discussion with family  - c/w zosyn for now  - d/c clindamycin   ASSESSMENT  89F w/ h/o chronic anemia, HTN, DLD p/w anemia. Pt is from [facility]. Had outpatient labwork, which showed WBC 44.8, Hb 6.3, PLT 35, and Ferritin >1500. No reported fevers, chills, CP, palpitations, SOB, abdominal pain, n/v/d, dysuria, or LE swelling. DELANO positive for melena. Labs significant for WBC 62.16, Hb 6.9, PLT 35, BNP 2164, Tn 0.03, and Lactate 0.9. CTA AP demonstrated no evidence of active GI bleed, but did show inflammatory changes involving CBD within the pancreatic head raising a question of cholangitis; per GI no evidence of cholangitis. Admitted to telemetry for ACS r/o and anemia.     PROBLEMS  #leukocytosis  #hypoxic respiratory failure  #AMS  - no clear signs or source of infection  - no sepsis  - no cholangitis per GI  - CXR - slightly increase right lung opacities  - currently on zosyn and clindamycin    RECOMMENDATIONS  - f/u pending cultures  - f/u procal  - r/o bone marrow disorder; f/u heme onc  - goals of care discussion with family  - c/w zosyn for now  - d/c clindamycin

## 2023-03-29 NOTE — SWALLOW BEDSIDE ASSESSMENT ADULT - SLP PERTINENT HISTORY OF CURRENT PROBLEM
89F w/ h/o chronic anemia, HTN, DLD p/w anemia. Pt is from [facility]. Had outpatient labwork, which showed WBC 44.8, Hb 6.3, PLT 35, and Ferritin >1500admitted to telemetry for ACS r/o and anemia. CTA AP demonstrated no evidence of active GI bleed, but did show inflammatory changes involving CBD within the pancreatic head raising a
89yo Female w/ h/o chronic anemia, HTN, DLD p/w anemia. Pt is from [facility]. Hospital course complicated by evidence of acute on chronic anemia, thrombocytopenia, acute leukocytosis.

## 2023-03-29 NOTE — CONSULT NOTE ADULT - SUBJECTIVE AND OBJECTIVE BOX
CHIRAG DENISE  89y, Female  Allergy: sulfa drugs (Other)      CHIEF COMPLAINT: Anemia (28 Mar 2023 09:40)      HPI:  89F w/ h/o chronic anemia, HTN, DLD p/w anemia. Pt is from [facility]. Had outpatient labwork, which showed WBC 44.8, Hb 6.3, PLT 35, and Ferritin >1500. No reported fevers, chills, CP, palpitations, SOB, abdominal pain, n/v/d, dysuria, or LE swelling.    In ED, pt HD stable on vitals. DELANO positive for melena. Labs significant for WBC 62.16, Hb 6.9, PLT 35, BNP 2164, Tn 0.03, and Lactate 0.9. CTA AP demonstrated no evidence of active GI bleed, but did show inflammatory changes involving CBD within the pancreatic head raising a   question of cholangitis. S/P 1u pRBC, pantoprazole 40mg IV x1, and Lasix 20mg IV x1. Subsequently admitted to telemetry for ACS r/o and anemia. (25 Mar 2023 18:18)    FAMILY HISTORY:    PAST MEDICAL & SURGICAL HISTORY:  Chronic anemia      HTN (hypertension)      Dyslipidemia          SOCIAL HISTORY    Substance Use (  ) never used  (  ) IVDU (  ) Other:  Tobacco Usage:  (   ) never smoked   (   ) former smoker   (   ) current smoker   Alcohol Usage: (   ) social  (   ) daily use (   ) denies  Sexual History:       ROS  General: Denies rigors, nightsweats  HEENT: Denies headache, rhinorrhea, sore throat, eye pain  CV: Denies CP, palpitations  PULM: Denies wheezing, hemoptysis  GI: Denies hematemesis, hematochezia, melena  : Denies discharge, hematuria  MSK: Denies arthralgias, myalgias  SKIN: Denies rash, lesions  NEURO: Denies paresthesias, weakness  PSYCH: Denies depression, anxiety    VITALS:  T(F): 97, Max: 97.9 (03-28-23 @ 12:01)  HR: 79  BP: 104/52  RR: 19Vital Signs Last 24 Hrs  T(C): 36.1 (29 Mar 2023 04:45), Max: 36.6 (28 Mar 2023 12:01)  T(F): 97 (29 Mar 2023 04:45), Max: 97.9 (28 Mar 2023 12:01)  HR: 79 (29 Mar 2023 05:11) (76 - 88)  BP: 104/52 (29 Mar 2023 05:11) (85/64 - 105/53)  BP(mean): 76 (28 Mar 2023 12:01) (76 - 76)  RR: 19 (29 Mar 2023 04:45) (18 - 19)  SpO2: 96% (29 Mar 2023 05:11) (95% - 96%)    Parameters below as of 29 Mar 2023 05:11  Patient On (Oxygen Delivery Method): nasal cannula        PHYSICAL EXAM:  Gen: NAD, resting in bed  HEENT: Normocephalic, atraumatic  Neck: supple, no lymphadenopathy  CV: Regular rate & regular rhythm  Lungs: decreased BS at bases  Abdomen: Soft, BS present  Ext: Warm, well perfused  Neuro: non focal, awake  Skin: no rash, no erythema    TESTS & MEASUREMENTS:                        7.6    57.91 )-----------( 23       ( 28 Mar 2023 01:37 )             24.5     03-28    136  |  101  |  28<H>  ----------------------------<  109<H>  3.5   |  24  |  1.1    Ca    7.5<L>      28 Mar 2023 01:37    TPro  5.9<L>  /  Alb  2.5<L>  /  TBili  1.3<H>  /  DBili  x   /  AST  42<H>  /  ALT  23  /  AlkPhos  52  03-28      LIVER FUNCTIONS - ( 28 Mar 2023 01:37 )  Alb: 2.5 g/dL / Pro: 5.9 g/dL / ALK PHOS: 52 U/L / ALT: 23 U/L / AST: 42 U/L / GGT: x               Culture - Blood (collected 03-27-23 @ 11:41)  Source: .Blood None  Preliminary Report (03-28-23 @ 18:01):    No growth to date.    Culture - Blood (collected 03-27-23 @ 11:41)  Source: .Blood None  Preliminary Report (03-28-23 @ 18:01):    No growth to date.        Lactate, Blood: 0.9 mmol/L (03-25-23 @ 00:30)      INFECTIOUS DISEASES TESTING      RADIOLOGY & ADDITIONAL TESTS:  I have personally reviewed the last Chest xray  CXR  Xray Chest 1 View AP/PA:   ACC: [id number] EXAM:  XR CHEST 1 VIEW   ORDERED BY: LUIS CARDENAS     PROCEDURE DATE:  03/27/2023          INTERPRETATION:  STUDY INDICATION: hypoxia, cough    TECHNIQUE:  Portable frontal view of the chest obtained.    COMPARISON: 3/25/2023    FINDINGS/  IMPRESSION:    Stable to slightly increased right lung opacities. No pneumothorax    Stable cardiomediastinal silhouette.    Unchanged osseous structures.    --- End of Report ---            IRLANDA CLARKE MD; Attending Radiologist  This document has been electronically signed. Mar 27 2023 12:35PM (03-27-23 @ 11:53)      CT      CARDIOLOGY TESTING  12 Lead ECG:   Ventricular Rate 86 BPM    Atrial Rate 86 BPM    P-R Interval 166 ms    QRS Duration 90 ms    Q-T Interval 352 ms    QTC Calculation(Bazett) 421 ms    P Axis 42 degrees    R Axis -34 degrees    T Axis -5 degrees    Diagnosis Line Sinus rhythm with Premature atrial complexes  Left axis deviation  Abnormal ECG    Confirmed by Vilma Rodríguez MD ([id number]) on 3/26/2023 11:23:53 AM (03-26-23 @ 08:35)      MEDICATIONS  albuterol/ipratropium for Nebulization 3  atorvastatin 80  calcium carbonate    500 mG (Tums) Chewable 1  chlorhexidine 2% Cloths 1  clindamycin IVPB 300  clindamycin IVPB   ferrous    sulfate 325  losartan 25  melatonin 5  multivitamin 1  oxybutynin 5  pantoprazole    Tablet 40  piperacillin/tazobactam IVPB. 3.375  piperacillin/tazobactam IVPB.. 3.375  polyethylene glycol 3350 17  senna 2      ANTIBIOTICS:  clindamycin IVPB 300 milliGRAM(s) IV Intermittent every 8 hours  clindamycin IVPB      piperacillin/tazobactam IVPB. 3.375 Gram(s) IV Intermittent once  piperacillin/tazobactam IVPB.. 3.375 Gram(s) IV Intermittent every 8 hours         CAMELIA, CHIRAG  89y, Female  Allergy: sulfa drugs (Other)      CHIEF COMPLAINT: Anemia (28 Mar 2023 09:40)      HPI:  89F w/ h/o chronic anemia, HTN, DLD p/w anemia. Pt is from [facility]. Had outpatient labwork, which showed WBC 44.8, Hb 6.3, PLT 35, and Ferritin >1500. No reported fevers, chills, CP, palpitations, SOB, abdominal pain, n/v/d, dysuria, or LE swelling.    In ED, pt HD stable on vitals. DELANO positive for melena. Labs significant for WBC 62.16, Hb 6.9, PLT 35, BNP 2164, Tn 0.03, and Lactate 0.9. CTA AP demonstrated no evidence of active GI bleed, but did show inflammatory changes involving CBD within the pancreatic head raising a   question of cholangitis. S/P 1u pRBC, pantoprazole 40mg IV x1, and Lasix 20mg IV x1. Subsequently admitted to telemetry for ACS r/o and anemia. (25 Mar 2023 18:18)    FAMILY HISTORY:    PAST MEDICAL & SURGICAL HISTORY:  Chronic anemia      HTN (hypertension)      Dyslipidemia          SOCIAL HISTORY    Substance Use (  ) never used  (  ) IVDU (  ) Other:  Tobacco Usage:  (   ) never smoked   (   ) former smoker   (   ) current smoker   Alcohol Usage: (   ) social  (   ) daily use (   ) denies  Sexual History:       ROS  unable to asses    VITALS:  T(F): 97, Max: 97.9 (03-28-23 @ 12:01)  HR: 79  BP: 104/52  RR: 19Vital Signs Last 24 Hrs  T(C): 36.1 (29 Mar 2023 04:45), Max: 36.6 (28 Mar 2023 12:01)  T(F): 97 (29 Mar 2023 04:45), Max: 97.9 (28 Mar 2023 12:01)  HR: 79 (29 Mar 2023 05:11) (76 - 88)  BP: 104/52 (29 Mar 2023 05:11) (85/64 - 105/53)  BP(mean): 76 (28 Mar 2023 12:01) (76 - 76)  RR: 19 (29 Mar 2023 04:45) (18 - 19)  SpO2: 96% (29 Mar 2023 05:11) (95% - 96%)    Parameters below as of 29 Mar 2023 05:11  Patient On (Oxygen Delivery Method): nasal cannula        PHYSICAL EXAM:  Gen: NAD  HEENT: NC around neck; Normocephalic, atraumatic  Neck: supple, no lymphadenopathy  CV: Regular rate & regular rhythm  Lungs: b/l breath sounds  Abdomen: mild tenderness; Soft, BS present  Ext: Warm, well perfused  Neuro: non focal, awake  Skin: no rashes    TESTS & MEASUREMENTS:                        7.6    57.91 )-----------( 23       ( 28 Mar 2023 01:37 )             24.5     03-28    136  |  101  |  28<H>  ----------------------------<  109<H>  3.5   |  24  |  1.1    Ca    7.5<L>      28 Mar 2023 01:37    TPro  5.9<L>  /  Alb  2.5<L>  /  TBili  1.3<H>  /  DBili  x   /  AST  42<H>  /  ALT  23  /  AlkPhos  52  03-28      LIVER FUNCTIONS - ( 28 Mar 2023 01:37 )  Alb: 2.5 g/dL / Pro: 5.9 g/dL / ALK PHOS: 52 U/L / ALT: 23 U/L / AST: 42 U/L / GGT: x               Culture - Blood (collected 03-27-23 @ 11:41)  Source: .Blood None  Preliminary Report (03-28-23 @ 18:01):    No growth to date.    Culture - Blood (collected 03-27-23 @ 11:41)  Source: .Blood None  Preliminary Report (03-28-23 @ 18:01):    No growth to date.        Lactate, Blood: 0.9 mmol/L (03-25-23 @ 00:30)      INFECTIOUS DISEASES TESTING      RADIOLOGY & ADDITIONAL TESTS:  I have personally reviewed the last Chest xray  CXR  Xray Chest 1 View AP/PA:   ACC: [id number] EXAM:  XR CHEST 1 VIEW   ORDERED BY: LUIS CARDENAS     PROCEDURE DATE:  03/27/2023          INTERPRETATION:  STUDY INDICATION: hypoxia, cough    TECHNIQUE:  Portable frontal view of the chest obtained.    COMPARISON: 3/25/2023    FINDINGS/  IMPRESSION:    Stable to slightly increased right lung opacities. No pneumothorax    Stable cardiomediastinal silhouette.    Unchanged osseous structures.    --- End of Report ---            IRLANDA CLARKE MD; Attending Radiologist  This document has been electronically signed. Mar 27 2023 12:35PM (03-27-23 @ 11:53)      CT      CARDIOLOGY TESTING  12 Lead ECG:   Ventricular Rate 86 BPM    Atrial Rate 86 BPM    P-R Interval 166 ms    QRS Duration 90 ms    Q-T Interval 352 ms    QTC Calculation(Bazett) 421 ms    P Axis 42 degrees    R Axis -34 degrees    T Axis -5 degrees    Diagnosis Line Sinus rhythm with Premature atrial complexes  Left axis deviation  Abnormal ECG    Confirmed by Veronica Rodríguez MDfer ([id number]) on 3/26/2023 11:23:53 AM (03-26-23 @ 08:35)      MEDICATIONS  albuterol/ipratropium for Nebulization 3  atorvastatin 80  calcium carbonate    500 mG (Tums) Chewable 1  chlorhexidine 2% Cloths 1  clindamycin IVPB 300  clindamycin IVPB   ferrous    sulfate 325  losartan 25  melatonin 5  multivitamin 1  oxybutynin 5  pantoprazole    Tablet 40  piperacillin/tazobactam IVPB. 3.375  piperacillin/tazobactam IVPB.. 3.375  polyethylene glycol 3350 17  senna 2      ANTIBIOTICS:  clindamycin IVPB 300 milliGRAM(s) IV Intermittent every 8 hours  clindamycin IVPB      piperacillin/tazobactam IVPB. 3.375 Gram(s) IV Intermittent once  piperacillin/tazobactam IVPB.. 3.375 Gram(s) IV Intermittent every 8 hours         CAMELIA CHIRAG  89y, Female  Allergy: sulfa drugs (Other)      CHIEF COMPLAINT: Anemia (28 Mar 2023 09:40)      HPI:  89F w/ h/o chronic anemia, HTN, DLD p/w anemia. Pt is from [facility]. Had outpatient labwork, which showed WBC 44.8, Hb 6.3, PLT 35, and Ferritin >1500. No reported fevers, chills, CP, palpitations, SOB, abdominal pain, n/v/d, dysuria, or LE swelling.    In ED, pt HD stable on vitals. DELANO positive for melena. Labs significant for WBC 62.16, Hb 6.9, PLT 35, BNP 2164, Tn 0.03, and Lactate 0.9. CTA AP demonstrated no evidence of active GI bleed, but did show inflammatory changes involving CBD within the pancreatic head raising a   question of cholangitis. S/P 1u pRBC, pantoprazole 40mg IV x1, and Lasix 20mg IV x1. Subsequently admitted to telemetry for ACS r/o and anemia. (25 Mar 2023 18:18)    ID consulted for leukocytosis.   Patient is a limited historian.     FAMILY HISTORY:  Family history reviewed and non-contributory    PAST MEDICAL & SURGICAL HISTORY:  Chronic anemia      HTN (hypertension)      Dyslipidemia          SOCIAL HISTORY    Substance Use (  ) never used  (  ) IVDU (  ) Other:  Tobacco Usage:  (   ) never smoked   (   ) former smoker   (   ) current smoker   Alcohol Usage: (   ) social  (   ) daily use (   ) denies  Sexual History:       ROS  unable to asses    VITALS:  T(F): 97, Max: 97.9 (03-28-23 @ 12:01)  HR: 79  BP: 104/52  RR: 19Vital Signs Last 24 Hrs  T(C): 36.1 (29 Mar 2023 04:45), Max: 36.6 (28 Mar 2023 12:01)  T(F): 97 (29 Mar 2023 04:45), Max: 97.9 (28 Mar 2023 12:01)  HR: 79 (29 Mar 2023 05:11) (76 - 88)  BP: 104/52 (29 Mar 2023 05:11) (85/64 - 105/53)  BP(mean): 76 (28 Mar 2023 12:01) (76 - 76)  RR: 19 (29 Mar 2023 04:45) (18 - 19)  SpO2: 96% (29 Mar 2023 05:11) (95% - 96%)    Parameters below as of 29 Mar 2023 05:11  Patient On (Oxygen Delivery Method): nasal cannula        PHYSICAL EXAM:  Gen: NAD  HEENT: NC around neck; Normocephalic, atraumatic  Neck: supple, no lymphadenopathy  CV: Regular rate & regular rhythm  Lungs: b/l breath sounds  Abdomen: mild tenderness; Soft, BS present  Ext: Warm, well perfused  Neuro: non focal, awake  Skin: no rashes    TESTS & MEASUREMENTS:                        7.6    57.91 )-----------( 23       ( 28 Mar 2023 01:37 )             24.5     03-28    136  |  101  |  28<H>  ----------------------------<  109<H>  3.5   |  24  |  1.1    Ca    7.5<L>      28 Mar 2023 01:37    TPro  5.9<L>  /  Alb  2.5<L>  /  TBili  1.3<H>  /  DBili  x   /  AST  42<H>  /  ALT  23  /  AlkPhos  52  03-28      LIVER FUNCTIONS - ( 28 Mar 2023 01:37 )  Alb: 2.5 g/dL / Pro: 5.9 g/dL / ALK PHOS: 52 U/L / ALT: 23 U/L / AST: 42 U/L / GGT: x               Culture - Blood (collected 03-27-23 @ 11:41)  Source: .Blood None  Preliminary Report (03-28-23 @ 18:01):    No growth to date.    Culture - Blood (collected 03-27-23 @ 11:41)  Source: .Blood None  Preliminary Report (03-28-23 @ 18:01):    No growth to date.        Lactate, Blood: 0.9 mmol/L (03-25-23 @ 00:30)      INFECTIOUS DISEASES TESTING      RADIOLOGY & ADDITIONAL TESTS:  I have personally reviewed the last Chest xray  CXR  Xray Chest 1 View AP/PA:   ACC: [id number] EXAM:  XR CHEST 1 VIEW   ORDERED BY: LUIS CARDENAS     PROCEDURE DATE:  03/27/2023          INTERPRETATION:  STUDY INDICATION: hypoxia, cough    TECHNIQUE:  Portable frontal view of the chest obtained.    COMPARISON: 3/25/2023    FINDINGS/  IMPRESSION:    Stable to slightly increased right lung opacities. No pneumothorax    Stable cardiomediastinal silhouette.    Unchanged osseous structures.    --- End of Report ---            IRLANDA CLARKE MD; Attending Radiologist  This document has been electronically signed. Mar 27 2023 12:35PM (03-27-23 @ 11:53)      CT      CARDIOLOGY TESTING  12 Lead ECG:   Ventricular Rate 86 BPM    Atrial Rate 86 BPM    P-R Interval 166 ms    QRS Duration 90 ms    Q-T Interval 352 ms    QTC Calculation(Bazett) 421 ms    P Axis 42 degrees    R Axis -34 degrees    T Axis -5 degrees    Diagnosis Line Sinus rhythm with Premature atrial complexes  Left axis deviation  Abnormal ECG    Confirmed by Vilma Rodríguez MD ([id number]) on 3/26/2023 11:23:53 AM (03-26-23 @ 08:35)      MEDICATIONS  albuterol/ipratropium for Nebulization 3  atorvastatin 80  calcium carbonate    500 mG (Tums) Chewable 1  chlorhexidine 2% Cloths 1  clindamycin IVPB 300  clindamycin IVPB   ferrous    sulfate 325  losartan 25  melatonin 5  multivitamin 1  oxybutynin 5  pantoprazole    Tablet 40  piperacillin/tazobactam IVPB. 3.375  piperacillin/tazobactam IVPB.. 3.375  polyethylene glycol 3350 17  senna 2      ANTIBIOTICS:  clindamycin IVPB 300 milliGRAM(s) IV Intermittent every 8 hours  clindamycin IVPB      piperacillin/tazobactam IVPB. 3.375 Gram(s) IV Intermittent once  piperacillin/tazobactam IVPB.. 3.375 Gram(s) IV Intermittent every 8 hours

## 2023-03-29 NOTE — PROGRESS NOTE ADULT - CONVERSATION DETAILS
SPoke with Zachary/HCP at bedside.  Palliative care introduced. Zachary is aware of the patient's clinical decline and poor overall prognosis.  We discussed that the patient had previously wanted Full code, but that we are in a different place now and the patient is actively declining. Discussed treatment options, including comfort measures only and hospice. All questions answered. He wanted to speak with family prior to any decisions.    Later in the day, received a call from the family. They wish to pursue comfort measures only with DNR/DNI and a hospice consult. All questions answered.

## 2023-03-29 NOTE — SWALLOW BEDSIDE ASSESSMENT ADULT - SWALLOW EVAL: ORAL MUSCULATURE
unable to assess due to poor participation/comprehension
+generalized weakness/unable to assess due to poor participation/comprehension

## 2023-03-29 NOTE — SWALLOW BEDSIDE ASSESSMENT ADULT - SWALLOW EVAL: DIAGNOSIS
Pt was in process of having BIPAP mask placed by RRT. Pt lethargic and minimally responsive today as per RN.
+ tolerance for puree and thin liquids without overt s/s of penetration/ aspiration.

## 2023-03-30 LAB — SARS-COV-2 RNA SPEC QL NAA+PROBE: SIGNIFICANT CHANGE UP

## 2023-03-30 PROCEDURE — 99231 SBSQ HOSP IP/OBS SF/LOW 25: CPT

## 2023-03-30 PROCEDURE — 99232 SBSQ HOSP IP/OBS MODERATE 35: CPT

## 2023-03-30 PROCEDURE — 99233 SBSQ HOSP IP/OBS HIGH 50: CPT

## 2023-03-30 RX ADMIN — LOSARTAN POTASSIUM 25 MILLIGRAM(S): 100 TABLET, FILM COATED ORAL at 06:39

## 2023-03-30 RX ADMIN — PANTOPRAZOLE SODIUM 40 MILLIGRAM(S): 20 TABLET, DELAYED RELEASE ORAL at 06:39

## 2023-03-30 RX ADMIN — Medication 0.5 MILLIGRAM(S): at 07:58

## 2023-03-30 NOTE — DIETITIAN INITIAL EVALUATION ADULT - PERTINENT LABORATORY DATA
03-29    140  |  101  |  42<H>  ----------------------------<  103<H>  3.0<L>   |  27  |  1.4    Ca    7.1<L>      29 Mar 2023 07:19  Phos  6.3     03-29  Mg     2.0     03-29    TPro  5.6<L>  /  Alb  2.5<L>  /  TBili  0.6  /  DBili  x   /  AST  45<H>  /  ALT  24  /  AlkPhos  48  03-29  A1C with Estimated Average Glucose Result: 5.1 % (03-26-23 @ 07:40)

## 2023-03-30 NOTE — PROGRESS NOTE ADULT - ASSESSMENT
89 yo F w/ PMH of chronic anemia, HTN, DLD presented with acute on chronic anemia. Pt is currently residing in the [facility] and had outpatient lab work which showed WBC 44.8k, Hb 6.3, PLT 35 and Ferritin >1500. In ED, pt underwent CT AP which showed inflammatory changes involving CBD within the pancreatic head raising a question of cholangitis, otherwise no CTA evidence of active gastrointestinal bleed. Her CBC in ED also shows leukocytosis with WBC of 62k and macrocytic anemia of 6.9. Hematology consulted for workup of pt's anemia and leukocytosis.    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    - Peripheral smear reviewed shows many monocytes, atypical lymphocytes, neutrophils and metamyelocytes.  - Would be more conservative given pt's age and hold off bone marrow biopsy.  -peripheral flow cytometry showed 17 % blasts.  - PTT/PT/INR, fibrinogen wnl  d-dimer 757, doubt DIC  - TLS panel noted, uric acid elevated can be related due to GILBERT, and LDH is hemolyzed.     # Acute on chronic macrocytic anemia   # Thrombocytopenia   - Baseline hgb 7 to 8 ([facility]IE labs).  - Pt follows with hematologist at [facility] with Dr. Ayush Hilario, please obtain records  - B12, folate was previously checked and was WNL  - Flow cytometry was done previously on 9/22 showed no remarkable abnormality.  - B12 and folate level wnl.  -  LDH elevated to 506. haptoglobin  normal and STACY Direct stacy test positive and Ig G positve.    Plan  -pt was noted to have 17 % blasts  on peripheral flow cytometry suggestive of AML. pt choose Comfort measures only. Agree with patient's decision given patient's age.

## 2023-03-30 NOTE — DIETITIAN INITIAL EVALUATION ADULT - OTHER INFO
Pertinent Medical Information: Per H&P, pt is a 89 female w/ h/o chronic anemia, HTN, DLD p/w anemia. Pt is from [facility]. Had outpatient lab work, which showed WBC 44.8, Hb 6.3, PLT 35, and Ferritin >1500. No reported fevers, chills, CP, palpitations, SOB, abdominal pain, n/v/d, dysuria, or LE swelling.    Per MD note on 3/30: Pt CMO, no IVs blood draws vitals, can continue allopurinol and antibiotics. Palliative on board, appreciated. Pleasure feeds as tolerated. Hospice as appropriate. Expect to pass this admission.     Per Palliative Care Attending on 3/29:    -Comfort measures only  -NO additional IVF, artificial nutrition, blood draws, vital signs, antibiotics, pressors, escalation of care    Pertinent Subjective Information: Per RN, pt became CMO as of 3/29. Pt has poor appetite. Untouched breakfast tray observed at bedside.    Weight hx: UBW unknown. Current dosing weight is 54.4 KG. Previous admission weight was  Pertinent Medical Information: Per H&P, pt is a 89 female w/ h/o chronic anemia, HTN, DLD p/w anemia. Pt is from [facility]. Had outpatient lab work, which showed WBC 44.8, Hb 6.3, PLT 35, and Ferritin >1500. No reported fevers, chills, CP, palpitations, SOB, abdominal pain, n/v/d, dysuria, or LE swelling.    Per MD note on 3/30: Pt CMO, no IVs blood draws vitals, can continue allopurinol and antibiotics. Palliative on board, appreciated. Pleasure feeds as tolerated. Hospice as appropriate. Expect to pass this admission.     Per Palliative Care Attending on 3/29:    -Comfort measures only  -NO additional IVF, artificial nutrition, blood draws, vital signs, antibiotics, pressors, escalation of care    Pertinent Subjective Information: Per RN, pt became CMO as of 3/29. Pt has poor appetite. Untouched breakfast tray observed at bedside.    Weight hx: UBW unknown. Current dosing weight is 54.4 KG. Previous admission weight was 54.3 KG on 11/17/22. Weight stable in over 4 months compared to 54.4 KG. Pt does not meet weight criteria for malnutrition at this time.

## 2023-03-30 NOTE — DIETITIAN INITIAL EVALUATION ADULT - ORAL INTAKE PTA/DIET HISTORY
Pt unable to participate in nutrition assessment interview; lethargic and disoriented per flowsheet. Unable to reach emergency contact or [facility] to obtain nutrition hx.

## 2023-03-30 NOTE — HOSPICE CARE NOTE - CONVESATION DETAILS
hospice referral completed. Phone calls to both patient's niece Laura and nephew Zachary. Reviewed hospice services. Family receptive to hospice services at [facility] where patient resides. hospice admission set for Monday 4/3/23. BINDU de luna.

## 2023-03-30 NOTE — PROGRESS NOTE ADULT - SUBJECTIVE AND OBJECTIVE BOX
HPI:  89F w/ h/o chronic anemia, HTN, DLD p/w anemia. Pt is from [facility]. Had outpatient labwork, which showed WBC 44.8, Hb 6.3, PLT 35, and Ferritin >1500. No reported fevers, chills, CP, palpitations, SOB, abdominal pain, n/v/d, dysuria, or LE swelling.    In ED, pt HD stable on vitals. DELANO positive for melena. Labs significant for WBC 62.16, Hb 6.9, PLT 35, BNP 2164, Tn 0.03, and Lactate 0.9. CTA AP demonstrated no evidence of active GI bleed, but did show inflammatory changes involving CBD within the pancreatic head raising a   question of cholangitis. S/P 1u pRBC, pantoprazole 40mg IV x1, and Lasix 20mg IV x1. Subsequently admitted to telemetry for ACS r/o and anemia. (25 Mar 2023 18:18)    Interval histoyr  -Patient seen at bedside, now comfort measures only  -She is unable to participate in exam  -No nonverbal sign of pain or distress on exam    ADVANCE DIRECTIVES:    [ ] Full Code [x ] DNR  MOLST  [ ]  Living Will  [ ]   DECISION MAKER(s):  [ x] Health Care Proxy(s)  [ ] Surrogate(s)  [ ] Guardian           Name(s): Phone Number(s): Howie Malik [phone]    BASELINE (I)ADL(s) (prior to admission):  [place]: [ ]Total  [ x] Moderate [ ]Dependent  Palliative Performance Status Version 2:         50%    http://npcrc.org/files/news/palliative_performance_scale_ppsv2.pdf    Allergies    sulfa drugs (Other)    Intolerances    MEDICATIONS  (STANDING):  allopurinol 100 milliGRAM(s) Oral daily  calcium carbonate    500 mG (Tums) Chewable 1 Tablet(s) Chew daily  chlorhexidine 2% Cloths 1 Application(s) Topical daily  ferrous    sulfate 325 milliGRAM(s) Oral daily  losartan 25 milliGRAM(s) Oral daily  melatonin 5 milliGRAM(s) Oral at bedtime  multivitamin 1 Tablet(s) Oral daily  oxybutynin 5 milliGRAM(s) Oral daily  pantoprazole    Tablet 40 milliGRAM(s) Oral two times a day  polyethylene glycol 3350 17 Gram(s) Oral daily  senna 2 Tablet(s) Oral at bedtime    MEDICATIONS  (PRN):  acetaminophen     Tablet .. 650 milliGRAM(s) Oral every 6 hours PRN Temp greater or equal to 38C (100.4F), Mild Pain (1 - 3)  glycopyrrolate Injectable 0.2 milliGRAM(s) IV Push four times a day PRN Secretions  HYDROmorphone  Injectable 0.5 milliGRAM(s) IV Push every 2 hours PRN Moderate pain (4-6), Severe pain (7-10), Respiratory rate greater than 22  LORazepam   Injectable 0.5 milliGRAM(s) IV Push every 4 hours PRN Anxiety      PRESENT SYMPTOMS: [ x]Unable to obtain due to poor mentation   Source if other than patient:  [ ]Family   [ ]Team     Pain: [ ]yes [ ]no  QOL impact -   Location -                    Aggravating factors -  Quality -  Radiation -  Timing-  Severity (0-10 scale):  Minimal acceptable level (0-10 scale):     CPOT:  0  https://www.[email or web address]/getattachment/[email or web address]/Critical-Care-Pain-Observation-Tool-(CPOT)    PAIN AD Score:   http://geriatrictoolkit.[email or web address]/painad.pdf (press ctrl +  left click to view)    Dyspnea:                           [x ]None[ ]Mild [ ]Moderate [ ]Severe     Respiratory Distress Observation Scale (RDOS): 2  A score of 0 to 2 signifies little or no respiratory distress, 3 signifies mild distress, scores 4 to 6 indicate moderate distress, and scores greater than 7 signify severe distress  [email or web address]://[email or web address]/sites/default/files/PDFS/413923-qxuprdlacid-eguciwlv-xkpjdptfupg-uhdvq.pdf    Anxiety:                             [ ]None[ ]Mild [ ]Moderate [ ]Severe   Fatigue:                             []None[ ]Mild [ ]Moderate [ ]Severe   Nausea:                            []None[ ]Mild [ ]Moderate [ ]Severe   Loss of appetite:              [ ]None[ ]Mild [ ]Moderate [ ]Severe   Constipation:                    [ ]None[ ]Mild [ ]Moderate [ ]Severe    Other Symptoms:  [ ]All other review of systems negative     Palliative Performance Status Version 2:        20%    http://npcrc.org/files/news/palliative_performance_scale_ppsv2.pdf    PHYSICAL EXAM:  Vital Signs Last 24 Hrs  T(C): 35.4 (30 Mar 2023 12:17), Max: 35.4 (30 Mar 2023 12:17)  T(F): 95.8 (30 Mar 2023 12:17), Max: 95.8 (30 Mar 2023 12:17)  HR: 81 (30 Mar 2023 12:17) (81 - 81)  BP: 126/62 (30 Mar 2023 12:17) (126/62 - 126/62)  BP(mean): --  RR: 18 (30 Mar 2023 12:17) (18 - 18)  SpO2: --    GENERAL:  [ ] No acute distress [ ]Lethargic  [x ]Unarousable  [ ]Verbal  [ ]Non-Verbal [ ]Cachexia    BEHAVIORAL/PSYCH:  [ ]Alert and Oriented x3  [ ] Anxiety [ ] Delirium [ ] Agitation [ x] Calm   EYES: [x ] No scleral icterus [ ] Scleral icterus [x ] Closed  ENMT:  [ ]Dry mouth  [x ]No external oral lesions [ ] No external ear or nose lesions  CARDIOVASCULAR:  [ ]Regular [ ]Irregular [ ]Tachy [x ]Not Tachy  [ ]Nima [ ] Edema [ ] No edema  PULMONARY:  [ ]Tachypnea  [ ]Audible excessive secretions [x ] No labored breathing [ ] mildly labored breathing  GASTROINTESTINAL: [ ]Soft  [ ]Distended  [ x]Not distended [ ]Non tender [ ]Tender  MUSCULOSKELETAL: [x ]No clubbing [ ] clubbing  [ ] No cyanosis [ ] cyanosis  NEUROLOGIC: [ ]No focal deficits  [ ]Follows commands  [x ]Does not follow commands  [ ]Cognitive impairment  [ ]Dysphagia  [ ]Dysarthria  [ ]Paresis [x] Hard of hearing  SKIN: [ ] Jaundiced [x ] Non-jaundiced [ ]Rash [ ]No Rash [ ] Warm [ ] Dry  MISC/LINES: [ ] ET tube [ ] Trach [ ]NGT/OGT [ ]PEG [ ]Rios    LABS: previous labs reviewed by me                          7.3    69.22 )-----------( 37       ( 29 Mar 2023 07:19 )             24.4       03-29    140  |  101  |  42<H>  ----------------------------<  103<H>  3.0<L>   |  27  |  1.4    Ca    7.1<L>      29 Mar 2023 07:19  Phos  6.3       Mg     2.0         TPro  5.6<L>  /  Alb  2.5<L>  /  TBili  0.6  /  DBili  x   /  AST  45<H>  /  ALT  24  /  AlkPhos  48            ABG - ( 28 Mar 2023 12:25 )  pH, Arterial: 7.28  pH, Blood: x     /  pCO2: 58    /  pO2: 75    / HCO3: 27    / Base Excess: 0.3   /  SaO2: 96.3                Urinalysis Basic - ( 29 Mar 2023 10:40 )    Color: Yellow / Appearance: Turbid / S.022 / pH: x  Gluc: x / Ketone: Negative  / Bili: Negative / Urobili: <2 mg/dL   Blood: x / Protein: 100 mg/dL / Nitrite: Negative   Leuk Esterase: Small / RBC: 46 /HPF / WBC 4 /HPF   Sq Epi: x / Non Sq Epi: 3 /HPF / Bacteria: Negative        PT/INR - ( 29 Mar 2023 07:19 )   PT: 13.70 sec;   INR: 1.20 ratio         PTT - ( 29 Mar 2023 07:19 )  PTT:29.3 sec          CAPILLARY BLOOD GLUCOSE                  RADIOLOGY & ADDITIONAL STUDIES: reviewed by me  < from: [facility] Duplex Lower Ext Vein Scan, Bilat (23 @ 18:48) >    IMPRESSION:  No evidence of deep venous thrombosis in eitherlower extremity.  left peroneal vein is not visualized      < end of copied text >      EKG: reviewed by me  < from: 12 Lead ECG (23 @ 08:35) >    Ventricular Rate 86 BPM    Atrial Rate 86 BPM    P-R Interval 166 ms    QRS Duration 90 ms    Q-T Interval 352 ms    QTC Calculation(Bazett) 421 ms    P Axis 42 degrees    R Axis -34 degrees    T Axis -5 degrees    Diagnosis Line Sinus rhythm with Premature atrial complexes  Left axis deviation  Abnormal ECG    < end of copied text >      PROTEIN CALORIE MALNUTRITION PRESENT: [ ]mild [ ]moderate [ ]severe [ ]underweight [ ]morbid obesity  https://www.andeal.org/vault/5970/web/files/ONC/Table_Clinical%20Characteristics%20to%20Document%20Malnutrition-White%20JV%20et%20al%170397.pdf    Height (cm): 154.9 (23 @ 20:51), 154.9 (23 @ 20:34), 154.9 (23 @ 20:17)  Weight (kg): 54.4 (23 @ 20:51), 52.2 (23 @ 19:41), 59 (22 @ 19:18)  BMI (kg/m2): 22.7 (23 @ 20:51), 21.8 (23 @ 20:34), 21.8 (23 @ 20:17)    [ ]PPSV2 < or = to 30% [ ]significant weight loss  [ ]poor nutritional intake  [ ]anasarca      [ ]Artificial Nutrition      REFERRALS:   [x ]Chaplaincy  [ ]Hospice  [ ]Child Life  [ x]Social Work  [ ]Case management [ ]Holistic Therapy     Patient discussed with primary medical team MD  Palliative care education provided to patient and/or family    Goals of Care Document:

## 2023-03-30 NOTE — PROGRESS NOTE ADULT - SUBJECTIVE AND OBJECTIVE BOX
Hospital day # : 6d  Events Overnight: none    Subjective: pt seems more awake today responding appropriately to some questions.    Physical Exam:  General: ill appearing elderly female, non-toxic appearing  Head: atraumatic, normocephalic  Eyes: EOMI, no icterus  Lungs/Chest: Clear to auscultation bilaterally, no wheeze  Heart: regular rate, regular rhythm, S1/S2  Abdomen: BS audible, non-distended, soft, non-tender  Extremities: no clubbing, no cyanosis, no edema  Neuro: answers some questions oriented x1-2 (self and place), speech clear and fluent, moving all extremities    Recent data:  OBJECTIVE DATA:    T(C): 36.1 (03-29-23 @ 13:08), Max: 36.1 (03-29-23 @ 13:08)  T(F): 97 (03-29-23 @ 13:08), Max: 97 (03-29-23 @ 13:08)  HR: 70 (03-29-23 @ 13:08) (70 - 70)  BP: 132/61 (03-29-23 @ 13:08) (132/61 - 132/61)  ABP: --  ABP(mean): --  RR: 19 (03-29-23 @ 13:08) (18 - 19)  SpO2: 99% (03-29-23 @ 10:45) (99% - 99%)      I&O's Summary                            7.3    69.22 )-----------( 37       ( 29 Mar 2023 07:19 )             24.4       03-29    140  |  101  |  42<H>  ----------------------------<  103<H>  3.0<L>   |  27  |  1.4    Ca    7.1<L>      29 Mar 2023 07:19  Phos  6.3     03-29  Mg     2.0     03-29    TPro  5.6<L>  /  Alb  2.5<L>  /  TBili  0.6  /  DBili  x   /  AST  45<H>  /  ALT  24  /  AlkPhos  48  03-29          ABG - ( 28 Mar 2023 12:25 )  pH, Arterial: 7.28  pH, Blood: x     /  pCO2: 58    /  pO2: 75    / HCO3: 27    / Base Excess: 0.3   /  SaO2: 96.3                              Assessment/Plan:  89F with h/o chronic anemia, HTN, DLD from NH p/w anemia on outpatient labwork. Initially p/w anemia seen on outpatient labs. DELANO positive (however possibly false positive i/s/o PO iron use). Has been to the ED multiple times over past several months for anemia requiring blood transfusions. Previously seen by heme-onc on prior admission, but recommended against further w/u for chronic anemia given pt's advanced age. Admitted to telemetry for ACS r/o and anemia.    IMPRESSION:    Acute Hypoxic respiratory failure   Altered mental status  Acute Leukemia on flow cytometry 37% blasts  Leukocytosis  Thrombocytopenia   Macrocytic anemia  Possible tumor lysis syndrome  Mild hypercapnia possible underlying COPD  ACS ruled out  CMO    Pt CMO no IVs blood draws vitals, can continue allopurinol and antibiotics. Palliative on board, appreciated. Pleasure feeds as tolerated   Hospital day # : 6d  Events Overnight: none    Subjective: lethargic, nonverbal    Physical Exam:  General: very ill appearing elderly female, non-toxic appearing  Head: atraumatic, normocephalic  Eyes: EOMI, no icterus  Lungs/Chest: Clear to auscultation bilaterally, no wheeze  Heart: regular rate, regular rhythm, S1/S2  Abdomen: BS audible, non-distended, soft, non-tender  Extremities: no clubbing, no cyanosis, no edema  Neuro: lethargic, exam limited    Recent data:  X      Assessment/Plan:  89F with h/o chronic anemia, HTN, DLD from NH p/w anemia on outpatient labwork. Initially p/w anemia seen on outpatient labs. DELANO positive (however possibly false positive i/s/o PO iron use). Has been to the ED multiple times over past several months for anemia requiring blood transfusions. Previously seen by heme-onc on prior admission, but recommended against further w/u for chronic anemia given pt's advanced age. Admitted to telemetry for ACS r/o and anemia.    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 Palliative on board, appreciated. Pleasure feeds as tolerated   Hospital day # : 6d  Events Overnight: none    Subjective: lethargic, nonverbal    Physical Exam:  General: very ill appearing elderly female, non-toxic appearing  Head: atraumatic, normocephalic  Eyes: EOMI, no icterus  Lungs/Chest: Clear to auscultation bilaterally, no wheeze  Heart: regular rate, regular rhythm, S1/S2  Abdomen: BS audible, non-distended, soft, non-tender  Extremities: no clubbing, no cyanosis, no edema  Neuro: lethargic, exam limited    Recent data:  X      Assessment/Plan:  89F with h/o chronic anemia, HTN, DLD from NH p/w anemia on outpatient labwork. Initially p/w anemia seen on outpatient labs. DELANO positive (however possibly false positive i/s/o PO iron use). Has been to the ED multiple times over past several months for anemia requiring blood transfusions. Previously seen by heme-onc on prior admission, but recommended against further w/u for chronic anemia given pt's advanced age. Admitted to telemetry for ACS r/o and anemia.    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 Palliative on board, appreciated. Pleasure feeds as tolerated. Hospice as appropriate. Expect to pass this admission.

## 2023-03-30 NOTE — DIETITIAN INITIAL EVALUATION ADULT - FLUID ACCUMULATION
Edema 1+ to left leg; right leg EDEMA: Edema 1+ to left leg; right leg  NFPE: unable to dx with malnutrition at this time; nutrition hx required

## 2023-03-30 NOTE — DIETITIAN INITIAL EVALUATION ADULT - PERTINENT MEDS FT
MEDICATIONS  (STANDING):  allopurinol 100 milliGRAM(s) Oral daily  calcium carbonate    500 mG (Tums) Chewable 1 Tablet(s) Chew daily  chlorhexidine 2% Cloths 1 Application(s) Topical daily  ferrous    sulfate 325 milliGRAM(s) Oral daily  losartan 25 milliGRAM(s) Oral daily  melatonin 5 milliGRAM(s) Oral at bedtime  multivitamin 1 Tablet(s) Oral daily  oxybutynin 5 milliGRAM(s) Oral daily  pantoprazole    Tablet 40 milliGRAM(s) Oral two times a day  polyethylene glycol 3350 17 Gram(s) Oral daily  senna 2 Tablet(s) Oral at bedtime    MEDICATIONS  (PRN):  acetaminophen     Tablet .. 650 milliGRAM(s) Oral every 6 hours PRN Temp greater or equal to 38C (100.4F), Mild Pain (1 - 3)  glycopyrrolate Injectable 0.2 milliGRAM(s) IV Push four times a day PRN Secretions  HYDROmorphone  Injectable 0.5 milliGRAM(s) IV Push every 2 hours PRN Moderate pain (4-6), Severe pain (7-10), Respiratory rate greater than 22  LORazepam   Injectable 0.5 milliGRAM(s) IV Push every 4 hours PRN Anxiety

## 2023-03-30 NOTE — DIETITIAN INITIAL EVALUATION ADULT - ADD RECOMMEND
1. Continue with current diet order per team - pt now CMO  2. No nutrition interventions warranted at this time    Moderate nutrition risk; will f/u in 5-7 days or prn. 1. Continue with current diet order per team - pt now CMO as of 3/29  2. No nutrition interventions warranted at this time    Moderate nutrition risk; will f/u in 5-7 days or prn.

## 2023-03-30 NOTE — PROGRESS NOTE ADULT - ATTENDING COMMENTS
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    She is quite frail when I saw her she just opened her eyes and would not follow commands about back asleep.    She would not benefit from treatment for her AML or MDS she would not tolerate them given her frail state essentially bedbound ECOG is a 4.    She is on comfort measures and I agreed to continue comfort measures prognosis is poor

## 2023-03-30 NOTE — PROGRESS NOTE ADULT - ATTENDING COMMENTS
patient seen and examined early this morning and note written later in the day   -agree with medical resident's note unless otherwise stated (patient is now CMO)    Vital Signs Last 24 Hrs  T(C): 35.4 (30 Mar 2023 12:17), Max: 35.4 (30 Mar 2023 12:17)  T(F): 95.8 (30 Mar 2023 12:17), Max: 95.8 (30 Mar 2023 12:17)  HR: 81 (30 Mar 2023 12:17) (81 - 81)  BP: 126/62 (30 Mar 2023 12:17) (126/62 - 126/62)  RR: 18 (30 Mar 2023 12:17) (18 - 18)             no new labs - patient is CMO               7.3    69.22 )-----------( 37       ( 29 Mar 2023 07:19 )             24.4     03-29    140  |  101  |  42<H>  ----------------------------<  103<H>  3.0<L>   |  27  |  1.4    Ca    7.1<L>      29 Mar 2023 07:19  Phos  6.3     03-29  Mg     2.0     03-29    TPro  5.6<L>  /  Alb  2.5<L>  /  TBili  0.6  /  DBili  x   /  AST  45<H>  /  ALT  24  /  AlkPhos  48  03-29    # CMO status   # Advanced illness - Metabolic encephalopathy; Leukocytosis, AHRF  # Advanced Age   # Electrolyte abnormality   # Stage II Sacral Decubiti POA     -CMO status (DNR/DNI) - family aware of the overall grave prognosis   -no escalation of care    -skin care as per nursing protocol - frequent turning and positioning - with offloading     DISPO: hospice d/c planning - will follow with CM   -staff updated family     Attending Physician Dr. Chayo Harris # [id number]

## 2023-03-30 NOTE — CHART NOTE - NSCHARTNOTEFT_GEN_A_CORE
visited patient earlier today. patient encountered resting in bed comfortably - sleeping - no non verbal signs of pain or distress noted. [phone]

## 2023-03-30 NOTE — PROGRESS NOTE ADULT - ASSESSMENT
89 year old woman with history of HTN, anemia, DLD presents with anemia from NH. Hospital course complicated by evidence of acute on chronic anemia, thrombocytopenia, acute leukocytosis. Palliative care consulted for GOC.    Patient is now comfort measures only. Hospice has been consulted.    MEDD (morphine equivalent daily dose):    Education about palliative care provided to patient/family.  See Recs below.    Please call [phone] with questions or concerns 24/7.   We will continue to follow.

## 2023-03-30 NOTE — DIETITIAN INITIAL EVALUATION ADULT - OTHER CALCULATIONS
Using ABW: ENERGY: 7611-4680 kcal/day (.50* 1.2-1.5 SF); PROTEIN: 65-82 g/day (1.2-1.5 g/kg); FLUID: 1360 mL/day (25 mL/kg) -- with consideration for age, suspected pcm

## 2023-03-30 NOTE — PROGRESS NOTE ADULT - SUBJECTIVE AND OBJECTIVE BOX
CHIRAG DENISE 89y Female  MRN#: [id number]     SUBJECTIVE  Patient is a 89y old Female who presents with a chief complaint of Anemia (30 Mar 2023 08:21)      Today is hospital day 5d, and this morning she is lying in bed without distress.   No acute overnight events.     OBJECTIVE  PAST MEDICAL & SURGICAL HISTORY  Chronic anemia    HTN (hypertension)    Dyslipidemia      ALLERGIES:  sulfa drugs (Other)    MEDICATIONS:  STANDING MEDICATIONS  allopurinol 100 milliGRAM(s) Oral daily  calcium carbonate    500 mG (Tums) Chewable 1 Tablet(s) Chew daily  chlorhexidine 2% Cloths 1 Application(s) Topical daily  ferrous    sulfate 325 milliGRAM(s) Oral daily  losartan 25 milliGRAM(s) Oral daily  melatonin 5 milliGRAM(s) Oral at bedtime  multivitamin 1 Tablet(s) Oral daily  oxybutynin 5 milliGRAM(s) Oral daily  pantoprazole    Tablet 40 milliGRAM(s) Oral two times a day  polyethylene glycol 3350 17 Gram(s) Oral daily  senna 2 Tablet(s) Oral at bedtime    PRN MEDICATIONS  acetaminophen     Tablet .. 650 milliGRAM(s) Oral every 6 hours PRN  glycopyrrolate Injectable 0.2 milliGRAM(s) IV Push four times a day PRN  HYDROmorphone  Injectable 0.5 milliGRAM(s) IV Push every 2 hours PRN  LORazepam   Injectable 0.5 milliGRAM(s) IV Push every 4 hours PRN    HOME MEDICATIONS  Home Medications:  aspirin 81 mg oral tablet, chewable: 1 tab(s) orally once a day (25 Mar 2023 21:36)  atorvastatin 80 mg oral tablet: 1 tab(s) orally once a day (at bedtime) (25 Mar 2023 21:35)  calcium carbonate 500 mg (200 mg elemental calcium) oral tablet, chewable: 1 tab(s) orally once a day (25 Mar 2023 21:36)  cyanocobalamin 1000 mcg oral tablet: 1 tab(s) orally once a day (25 Mar 2023 21:35)  ferrous sulfate 220 mg/5 mL (44 mg/5 mL elemental iron) oral elixir: 7.5 milliliter(s) orally 2 times a day (25 Mar 2023 21:29)  folic acid 1 mg oral tablet: 1 tab(s) orally once a day (25 Mar 2023 21:36)  gabapentin 100 mg oral capsule: 1 cap(s) orally 3 times a day (25 Mar 2023 21:36)  glucosamine hydrochloride 1500 mg oral tablet: 1 tab(s) orally once a day (25 Mar 2023 21:36)  losartan 25 mg oral tablet: 1 tab(s) orally once a day (25 Mar 2023 21:36)  melatonin 5 mg oral tablet: 1 tab(s) orally once a day (at bedtime) (25 Mar 2023 21:36)  MiraLax oral powder for reconstitution: 17 gram(s) orally once a day (25 Mar 2023 21:36)  Multiple Vitamins oral tablet: 1 tab(s) orally once a day (25 Mar 2023 21:36)  oxybutynin 5 mg/24 hours oral tablet, extended release: 1 tab(s) orally once a day (25 Mar 2023 21:36)  senna (sennosides) 8.6 mg oral tablet: 2 tab(s) orally once a day (at bedtime) (25 Mar 2023 21:36)  Vitamin C 500 mg oral tablet, chewable: 1 tab(s) chewed once a day (25 Mar 2023 21:36)      VITAL SIGNS: Last 24 Hours  T(C): 36.1 (29 Mar 2023 13:08), Max: 36.1 (29 Mar 2023 13:08)  T(F): 97 (29 Mar 2023 13:08), Max: 97 (29 Mar 2023 13:08)  HR: 70 (29 Mar 2023 13:08) (70 - 70)  BP: 132/61 (29 Mar 2023 13:08) (132/61 - 132/61)  BP(mean): --  RR: 19 (29 Mar 2023 13:08) (18 - 19)  SpO2: 99% (29 Mar 2023 10:45) (99% - 99%)      LABS:                        7.3    69.22 )-----------( 37       ( 29 Mar 2023 07:19 )             24.4     03-29    140  |  101  |  42<H>  ----------------------------<  103<H>  3.0<L>   |  27  |  1.4    Ca    7.1<L>      29 Mar 2023 07:19  Phos  6.3     03-29  Mg     2.0         TPro  5.6<L>  /  Alb  2.5<L>  /  TBili  0.6  /  DBili  x   /  AST  45<H>  /  ALT  24  /  AlkPhos  48      LIVER FUNCTIONS - ( 29 Mar 2023 07:19 )  Alb: 2.5 g/dL / Pro: 5.6 g/dL / ALK PHOS: 48 U/L / ALT: 24 U/L / AST: 45 U/L / GGT: x           PT/INR - ( 29 Mar 2023 07:19 )   PT: 13.70 sec;   INR: 1.20 ratio         PTT - ( 29 Mar 2023 07:19 )  PTT:29.3 sec  Urinalysis Basic - ( 29 Mar 2023 10:40 )    Color: Yellow / Appearance: Turbid / S.022 / pH: x  Gluc: x / Ketone: Negative  / Bili: Negative / Urobili: <2 mg/dL   Blood: x / Protein: 100 mg/dL / Nitrite: Negative   Leuk Esterase: Small / RBC: 46 /HPF / WBC 4 /HPF   Sq Epi: x / Non Sq Epi: 3 /HPF / Bacteria: Negative      ABG - ( 28 Mar 2023 12:25 )  pH, Arterial: 7.28  pH, Blood: x     /  pCO2: 58    /  pO2: 75    / HCO3: 27    / Base Excess: 0.3   /  SaO2: 96.3                  Culture - Blood (collected 27 Mar 2023 11:41)  Source: .Blood None  Preliminary Report (28 Mar 2023 18:01):    No growth to date.    Culture - Blood (collected 27 Mar 2023 11:41)  Source: .Blood None  Preliminary Report (28 Mar 2023 18:01):    No growth to date.          CAPILLARY BLOOD GLUCOSE    Peripheral blood flow cytometry       - The findings are consistent with increase in myeloblasts with increase in monocytic  population.  Please see interpretation.    INTERPRETATION:  MORPHOLOGY: Smear shows 17% blasts and 27% monocytic cells  CYTOSPIN:          PHYSICAL EXAM:  PHYSICAL EXAM:  GENERAL: NAD, AAO x 4, 89y F  HEAD:  Atraumatic, Normocephalic  EYES: EOMI, conjunctivae clear and sclerae white  NECK: Supple, No JVD  CHEST/LUNG: Clear to auscultation bilaterally; No wheeze; No crackles; No accessory muscles used  HEART: Regular rate and rhythm; No murmurs;   ABDOMEN: Soft, Nontender, Nondistended; Bowel sounds present; No guarding  EXTREMITIES:  2+ Peripheral Pulses, No cyanosis or edema  NEUROLOGY: non-focal    ADMISSION SUMMARY  Patient is a 89y old Female who presents with a chief complaint of Anemia (30 Mar 2023 08:21)

## 2023-03-31 ENCOUNTER — TRANSCRIPTION ENCOUNTER (OUTPATIENT)
Age: 88
End: 2023-03-31

## 2023-03-31 VITALS
HEART RATE: 91 BPM | DIASTOLIC BLOOD PRESSURE: 67 MMHG | RESPIRATION RATE: 22 BRPM | TEMPERATURE: 100 F | SYSTOLIC BLOOD PRESSURE: 152 MMHG

## 2023-03-31 LAB
GAMMA INTERFERON BACKGROUND BLD IA-ACNC: 0.02 IU/ML — SIGNIFICANT CHANGE UP
M TB IFN-G BLD-IMP: NEGATIVE — SIGNIFICANT CHANGE UP
M TB IFN-G CD4+ BCKGRND COR BLD-ACNC: 0 IU/ML — SIGNIFICANT CHANGE UP
M TB IFN-G CD4+CD8+ BCKGRND COR BLD-ACNC: 0 IU/ML — SIGNIFICANT CHANGE UP
QUANT TB PLUS MITOGEN MINUS NIL: 0.96 IU/ML — SIGNIFICANT CHANGE UP

## 2023-03-31 PROCEDURE — 99231 SBSQ HOSP IP/OBS SF/LOW 25: CPT

## 2023-03-31 PROCEDURE — 99232 SBSQ HOSP IP/OBS MODERATE 35: CPT

## 2023-03-31 RX ORDER — CHLORHEXIDINE GLUCONATE 213 G/1000ML
1 SOLUTION TOPICAL
Qty: 0 | Refills: 0 | DISCHARGE
Start: 2023-03-31

## 2023-03-31 RX ORDER — POLYETHYLENE GLYCOL 3350 17 G/17G
17 POWDER, FOR SOLUTION ORAL
Refills: 0 | DISCHARGE

## 2023-03-31 RX ORDER — PREGABALIN 225 MG/1
1 CAPSULE ORAL
Refills: 0 | DISCHARGE

## 2023-03-31 RX ORDER — OXYBUTYNIN CHLORIDE 5 MG
1 TABLET ORAL
Qty: 0 | Refills: 0 | DISCHARGE
Start: 2023-03-31

## 2023-03-31 RX ORDER — FOLIC ACID 0.8 MG
1 TABLET ORAL
Qty: 0 | Refills: 0 | DISCHARGE

## 2023-03-31 RX ORDER — POLYETHYLENE GLYCOL 3350 17 G/17G
17 POWDER, FOR SOLUTION ORAL
Qty: 0 | Refills: 0 | DISCHARGE
Start: 2023-03-31

## 2023-03-31 RX ORDER — GABAPENTIN 400 MG/1
1 CAPSULE ORAL
Refills: 0 | DISCHARGE

## 2023-03-31 RX ORDER — PANTOPRAZOLE SODIUM 20 MG/1
1 TABLET, DELAYED RELEASE ORAL
Qty: 0 | Refills: 0 | DISCHARGE
Start: 2023-03-31

## 2023-03-31 RX ORDER — OXYBUTYNIN CHLORIDE 5 MG
1 TABLET ORAL
Qty: 0 | Refills: 0 | DISCHARGE

## 2023-03-31 RX ORDER — ALLOPURINOL 300 MG
1 TABLET ORAL
Qty: 0 | Refills: 0 | DISCHARGE
Start: 2023-03-31

## 2023-03-31 RX ORDER — SENNA PLUS 8.6 MG/1
2 TABLET ORAL
Refills: 0 | DISCHARGE

## 2023-03-31 RX ORDER — SENNA PLUS 8.6 MG/1
2 TABLET ORAL
Qty: 0 | Refills: 0 | DISCHARGE
Start: 2023-03-31

## 2023-03-31 RX ORDER — LANOLIN ALCOHOL/MO/W.PET/CERES
1 CREAM (GRAM) TOPICAL
Qty: 0 | Refills: 0 | DISCHARGE
Start: 2023-03-31

## 2023-03-31 RX ORDER — MORPHINE SULFATE 50 MG/1
5 CAPSULE, EXTENDED RELEASE ORAL
Qty: 0 | Refills: 0 | DISCHARGE
Start: 2023-03-31

## 2023-03-31 RX ORDER — ATORVASTATIN CALCIUM 80 MG/1
1 TABLET, FILM COATED ORAL
Refills: 0 | DISCHARGE

## 2023-03-31 RX ORDER — ASPIRIN/CALCIUM CARB/MAGNESIUM 324 MG
1 TABLET ORAL
Qty: 0 | Refills: 0 | DISCHARGE

## 2023-03-31 RX ORDER — ASCORBIC ACID 60 MG
1 TABLET,CHEWABLE ORAL
Refills: 0 | DISCHARGE

## 2023-03-31 RX ORDER — ACETAMINOPHEN 500 MG
2 TABLET ORAL
Qty: 0 | Refills: 0 | DISCHARGE
Start: 2023-03-31

## 2023-03-31 RX ORDER — MORPHINE SULFATE 50 MG/1
5 CAPSULE, EXTENDED RELEASE ORAL
Refills: 0 | Status: DISCONTINUED | OUTPATIENT
Start: 2023-03-31 | End: 2023-03-31

## 2023-03-31 RX ORDER — LOSARTAN POTASSIUM 100 MG/1
1 TABLET, FILM COATED ORAL
Qty: 0 | Refills: 0 | DISCHARGE
Start: 2023-03-31

## 2023-03-31 RX ORDER — CALCIUM CARBONATE 500(1250)
1 TABLET ORAL
Qty: 0 | Refills: 0 | DISCHARGE

## 2023-03-31 RX ORDER — FERROUS SULFATE 325(65) MG
7.5 TABLET ORAL
Refills: 0 | DISCHARGE

## 2023-03-31 RX ORDER — LOSARTAN POTASSIUM 100 MG/1
1 TABLET, FILM COATED ORAL
Refills: 0 | DISCHARGE

## 2023-03-31 RX ADMIN — HYDROMORPHONE HYDROCHLORIDE 0.5 MILLIGRAM(S): 2 INJECTION INTRAMUSCULAR; INTRAVENOUS; SUBCUTANEOUS at 02:00

## 2023-03-31 RX ADMIN — HYDROMORPHONE HYDROCHLORIDE 0.5 MILLIGRAM(S): 2 INJECTION INTRAMUSCULAR; INTRAVENOUS; SUBCUTANEOUS at 11:17

## 2023-03-31 NOTE — DISCHARGE NOTE PROVIDER - HOSPITAL COURSE
89F with h/o chronic anemia, HTN, DLD from NH p/w anemia on outpatient labwork. Initially p/w anemia seen on outpatient labs. DELANO positive (however possibly false positive i/s/o PO iron use). Has been to the ED multiple times over past several months for anemia requiring blood transfusions. Previously seen by heme-onc on prior admission, but recommended against further w/u for chronic anemia given pt's advanced age. Admitted to telemetry for ACS r/o and anemia. 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 Pt made CMO by family. Will be discharged to hospice care facility.

## 2023-03-31 NOTE — DISCHARGE NOTE PROVIDER - NSDCCPCAREPLAN_GEN_ALL_CORE_FT
PRINCIPAL DISCHARGE DIAGNOSIS  Diagnosis: Hospice care  Assessment and Plan of Treatment: Pt evaluated in the hospital, found to have acute myeloid leukemia and severe thrombocytopenia with leukocytosis and anemia. Family wished for comfort care measures only. Pt to be discharged to hospice care facility.      SECONDARY DISCHARGE DIAGNOSES  Diagnosis: Acute myeloid leukemia  Assessment and Plan of Treatment:     Diagnosis: Acute respiratory failure with hypercapnia  Assessment and Plan of Treatment:

## 2023-03-31 NOTE — DISCHARGE NOTE PROVIDER - NSDCMRMEDTOKEN_GEN_ALL_CORE_FT
acetaminophen 325 mg oral tablet: 2 tab(s) orally every 6 hours As needed Temp greater or equal to 38C (100.4F), Mild Pain (1 - 3)  allopurinol 100 mg oral tablet: 1 tab(s) orally once a day  chlorhexidine 2% topical pad: 1 Apply topically to affected area once a day  LORazepam 2 mg/mL oral concentrate: 0.5 milliliter(s) sublingual every 4 hours as needed for anxiety  losartan 25 mg oral tablet: 1 tab(s) orally once a day  melatonin 5 mg oral tablet: 1 tab(s) orally once a day (at bedtime)  morphine 20 mg/mL oral concentrate: 5 milliliter(s) sublingual every 3 hours as needed for pain, dyspnea  oxybutynin 5 mg oral tablet: 1 tab(s) orally once a day  pantoprazole 40 mg oral delayed release tablet: 1 tab(s) orally 2 times a day  polyethylene glycol 3350 oral powder for reconstitution: 17 gram(s) orally once a day  senna leaf extract oral tablet: 2 tab(s) orally once a day (at bedtime)

## 2023-03-31 NOTE — PROGRESS NOTE ADULT - NSPROGADDITIONALINFOA_GEN_ALL_CORE
High risk patient receiving IV opioids for symptom management
High risk patient receiving IV opioids for symptom management

## 2023-03-31 NOTE — CHART NOTE - NSCHARTNOTEFT_GEN_A_CORE
patient encountered resting comfortably in bed today - sleeping - no non verbal signs of pain or distress noted. no family at bedside at time of visit. will follow. [id number]

## 2023-03-31 NOTE — PROGRESS NOTE ADULT - PROVIDER SPECIALTY LIST ADULT
Heme/Onc
Internal Medicine
Palliative Care

## 2023-03-31 NOTE — PROGRESS NOTE ADULT - ASSESSMENT
89 year old woman with history of HTN, anemia, DLD presents with anemia from NH. Hospital course complicated by evidence of acute on chronic anemia, thrombocytopenia, acute leukocytosis. Palliative care consulted for GOC.    Patient is now comfort measures only.  Plan for discharge to hospice today.    MEDD (morphine equivalent daily dose):    Education about palliative care provided to patient/family.  See Recs below.    Please call [phone] with questions or concerns 24/7.   We will continue to follow.

## 2023-03-31 NOTE — DISCHARGE NOTE NURSING/CASE MANAGEMENT/SOCIAL WORK - PATIENT PORTAL LINK FT
You can access the FollowMyHealth Patient Portal offered by [facility] by registering at the following website: [email or web address]. By joining [facility]’s FollowMyHealth portal, you will also be able to view your health information using other applications (apps) compatible with our system.

## 2023-03-31 NOTE — PROGRESS NOTE ADULT - PROBLEM SELECTOR PLAN 3
-no additional blood draws or transfusion  -comfort measures only

## 2023-03-31 NOTE — PROGRESS NOTE ADULT - TIME BILLING
direct patient care and chart review  -coordinated current plan of care with medical staff on MDR

## 2023-03-31 NOTE — PROGRESS NOTE ADULT - PROBLEM SELECTOR PLAN 1
-DNR/DNI  -Comfort measures only  -NO additional IVF, artificial nutrition, blood draws, vital signs, antibiotics, pressors, escalation of care  -Hospice consult    acetaminophen     Tablet .. 650 milliGRAM(s) Oral every 6 hours PRN Temp greater or equal to 38C (100.4F), Mild Pain (1 - 3)  glycopyrrolate Injectable 0.2 milliGRAM(s) IV Push four times a day PRN Secretions  HYDROmorphone  Injectable 0.5 milliGRAM(s) IV Push every 2 hours PRN Moderate pain (4-6), Severe pain (7-10), Respiratory rate greater than 22  LORazepam   Injectable 0.5 milliGRAM(s) IV Push every 4 hours PRN Anxiety
-DNR/DNI  -Comfort measures only  -NO additional IVF, artificial nutrition, blood draws, vital signs, antibiotics, pressors, escalation of care  -Hospice consult  -Comfort meds as below - will transition to sublingual meds on discharge    acetaminophen     Tablet .. 650 milliGRAM(s) Oral every 6 hours PRN Temp greater or equal to 38C (100.4F), Mild Pain (1 - 3)  glycopyrrolate Injectable 0.2 milliGRAM(s) IV Push four times a day PRN Secretions  HYDROmorphone  Injectable 0.5 milliGRAM(s) IV Push every 2 hours PRN Moderate pain (4-6), Severe pain (7-10), Respiratory rate greater than 22  LORazepam   Injectable 0.5 milliGRAM(s) IV Push every 4 hours PRN Anxiety
-DNR/DNI  -Comfort measures only  -NO additional IVF, artificial nutrition, blood draws, vital signs, antibiotics, pressors, escalation of care  -Hospice consult  -Comfort meds as below - will transition to sublingual meds now    Stop IV dilaudid and IV ativan  -start morphine concentrate 5mg SL q3h PRN for pain/dyspnea  -start ativan concentrate 0.5mg SL q4h PRN for agitation/anxiety

## 2023-03-31 NOTE — PROGRESS NOTE ADULT - ATTENDING COMMENTS
patient seen and examined early this morning and note written later in the day   -agree with medical resident's note unless otherwise stated (patient is now CMO)    Vital Signs Last 24 Hrs  T(C): 37.6 (31 Mar 2023 11:20), Max: 37.6 (31 Mar 2023 11:20)  T(F): 99.7 (31 Mar 2023 11:20), Max: 99.7 (31 Mar 2023 11:20)  HR: 91 (31 Mar 2023 11:20) (81 - 91)  BP: 152/67 (31 Mar 2023 11:20) (126/62 - 152/67)  BP(mean): --  RR: 22 (31 Mar 2023 11:20) (18 - 22)  SpO2: --               no new labs - patient is CMO               7.3    69.22 )-----------( 37       ( 29 Mar 2023 07:19 )             24.4     03-29    140  |  101  |  42<H>  ----------------------------<  103<H>  3.0<L>   |  27  |  1.4    Ca    7.1<L>      29 Mar 2023 07:19  Phos  6.3     03-29  Mg     2.0     03-29    TPro  5.6<L>  /  Alb  2.5<L>  /  TBili  0.6  /  DBili  x   /  AST  45<H>  /  ALT  24  /  AlkPhos  48  03-29    # CMO status   # Advanced illness - Metabolic encephalopathy; Leukocytosis, AHRF  # Advanced Age   # Electrolyte abnormality   # Stage II Sacral Decubiti POA     -CMO status (DNR/DNI) - family aware of the overall grave prognosis   -no escalation of care    -skin care as per nursing protocol - frequent turning and positioning - with offloading     DISPO: hospice d/c planning - will follow with    -staff updated family - no change in current plan     Attending Physician Dr. Chayo Harris # [id number]

## 2023-03-31 NOTE — PROGRESS NOTE ADULT - PROBLEM SELECTOR PLAN 2
-No additional escalation of oxygen  -no additional antibiotics

## 2023-03-31 NOTE — PROGRESS NOTE ADULT - SUBJECTIVE AND OBJECTIVE BOX
HPI:  89F w/ h/o chronic anemia, HTN, DLD p/w anemia. Pt is from [facility]. Had outpatient labwork, which showed WBC 44.8, Hb 6.3, PLT 35, and Ferritin >1500. No reported fevers, chills, CP, palpitations, SOB, abdominal pain, n/v/d, dysuria, or LE swelling.    In ED, pt HD stable on vitals. DELANO positive for melena. Labs significant for WBC 62.16, Hb 6.9, PLT 35, BNP 2164, Tn 0.03, and Lactate 0.9. CTA AP demonstrated no evidence of active GI bleed, but did show inflammatory changes involving CBD within the pancreatic head raising a   question of cholangitis. S/P 1u pRBC, pantoprazole 40mg IV x1, and Lasix 20mg IV x1. Subsequently admitted to telemetry for ACS r/o and anemia. (25 Mar 2023 18:18)    Interval history  -Patient seen at bedside, now comfort measures only  -She is unable to participate in exam  -No nonverbal sign of pain or distress on exam    ADVANCE DIRECTIVES:    [ ] Full Code [x ] DNR  MOLST  [ ]  Living Will  [ ]   DECISION MAKER(s):  [ x] Health Care Proxy(s)  [ ] Surrogate(s)  [ ] Guardian           Name(s): Phone Number(s): Howie Malik [phone]    BASELINE (I)ADL(s) (prior to admission):  [place]: [ ]Total  [ x] Moderate [ ]Dependent  Palliative Performance Status Version 2:         50%    http://npcrc.org/files/news/palliative_performance_scale_ppsv2.pdf    Allergies    sulfa drugs (Other)    Intolerances    MEDICATIONS  (STANDING):  allopurinol 100 milliGRAM(s) Oral daily  chlorhexidine 2% Cloths 1 Application(s) Topical daily  losartan 25 milliGRAM(s) Oral daily  melatonin 5 milliGRAM(s) Oral at bedtime  oxybutynin 5 milliGRAM(s) Oral daily  pantoprazole    Tablet 40 milliGRAM(s) Oral two times a day  polyethylene glycol 3350 17 Gram(s) Oral daily  senna 2 Tablet(s) Oral at bedtime    MEDICATIONS  (PRN):  acetaminophen     Tablet .. 650 milliGRAM(s) Oral every 6 hours PRN Temp greater or equal to 38C (100.4F), Mild Pain (1 - 3)  glycopyrrolate Injectable 0.2 milliGRAM(s) IV Push four times a day PRN Secretions  HYDROmorphone  Injectable 0.5 milliGRAM(s) IV Push every 2 hours PRN Moderate pain (4-6), Severe pain (7-10), Respiratory rate greater than 22  LORazepam   Injectable 0.5 milliGRAM(s) IV Push every 4 hours PRN Anxiety      PRESENT SYMPTOMS: [ x]Unable to obtain due to poor mentation   Source if other than patient:  [ ]Family   [ ]Team     Pain: [ ]yes [ ]no  QOL impact -   Location -                    Aggravating factors -  Quality -  Radiation -  Timing-  Severity (0-10 scale):  Minimal acceptable level (0-10 scale):     CPOT:  0  https://www.[facility].org/getattachment/[email or web address]/Critical-Care-Pain-Observation-Tool-(CPOT)    PAIN AD Score:   http://geriatrictoolkit.[facility]/cog/painad.pdf (press ctrl +  left click to view)    Dyspnea:                           [x ]None[ ]Mild [ ]Moderate [ ]Severe     Respiratory Distress Observation Scale (RDOS): 2  A score of 0 to 2 signifies little or no respiratory distress, 3 signifies mild distress, scores 4 to 6 indicate moderate distress, and scores greater than 7 signify severe distress  https://www.[facility].ca/sites/default/files/PDFS/948740-lapamuuqfli-ergtsokz-bvbdjzwntxt-hcqxj.pdf    Anxiety:                             [ ]None[ ]Mild [ ]Moderate [ ]Severe   Fatigue:                             []None[ ]Mild [ ]Moderate [ ]Severe   Nausea:                            []None[ ]Mild [ ]Moderate [ ]Severe   Loss of appetite:              [ ]None[ ]Mild [ ]Moderate [ ]Severe   Constipation:                    [ ]None[ ]Mild [ ]Moderate [ ]Severe    Other Symptoms:  [ ]All other review of systems negative     Palliative Performance Status Version 2:        20%    http://[facility].org/files/news/palliative_performance_scale_ppsv2.pdf    PHYSICAL EXAM:  Vital Signs Last 24 Hrs  T(C): 37.6 (31 Mar 2023 11:20), Max: 37.6 (31 Mar 2023 11:20)  T(F): 99.7 (31 Mar 2023 11:20), Max: 99.7 (31 Mar 2023 11:20)  HR: 91 (31 Mar 2023 11:20) (91 - 91)  BP: 152/67 (31 Mar 2023 11:20) (152/67 - 152/67)  BP(mean): --  RR: 22 (31 Mar 2023 11:20) (22 - 22)  SpO2: --      GENERAL:  [ ] No acute distress [ ]Lethargic  [x ]Unarousable  [ ]Verbal  [ ]Non-Verbal [ ]Cachexia    BEHAVIORAL/PSYCH:  [ ]Alert and Oriented x3  [ ] Anxiety [ ] Delirium [ ] Agitation [ x] Calm   EYES: [x ] No scleral icterus [ ] Scleral icterus [x ] Closed  ENMT:  [ ]Dry mouth  [x ]No external oral lesions [ ] No external ear or nose lesions  CARDIOVASCULAR:  [ ]Regular [ ]Irregular [ ]Tachy [x ]Not Tachy  [ ]Nima [ ] Edema [ ] No edema  PULMONARY:  [ ]Tachypnea  [ ]Audible excessive secretions [x ] No labored breathing [ ] mildly labored breathing  GASTROINTESTINAL: [ ]Soft  [ ]Distended  [ x]Not distended [ ]Non tender [ ]Tender  MUSCULOSKELETAL: [x ]No clubbing [ ] clubbing  [ ] No cyanosis [ ] cyanosis  NEUROLOGIC: [ ]No focal deficits  [ ]Follows commands  [x ]Does not follow commands  [ ]Cognitive impairment  [ ]Dysphagia  [ ]Dysarthria  [ ]Paresis [x] Hard of hearing  SKIN: [ ] Jaundiced [x ] Non-jaundiced [ ]Rash [ ]No Rash [ ] Warm [ ] Dry  MISC/LINES: [ ] ET tube [ ] Trach [ ]NGT/OGT [ ]PEG [ ]Rios    LABS: previous labs reviewed by me    RADIOLOGY & ADDITIONAL STUDIES: reviewed by me  < from: VA Duplex Lower Ext Vein Scan, Bilat (03.28.23 @ 18:48) >    IMPRESSION:  No evidence of deep venous thrombosis in eitherlower extremity.  left peroneal vein is not visualized      < end of copied text >      EKG: reviewed by me  < from: 12 Lead ECG (03.26.23 @ 08:35) >    Ventricular Rate 86 BPM    Atrial Rate 86 BPM    P-R Interval 166 ms    QRS Duration 90 ms    Q-T Interval 352 ms    QTC Calculation(Bazett) 421 ms    P Axis 42 degrees    R Axis -34 degrees    T Axis -5 degrees    Diagnosis Line Sinus rhythm with Premature atrial complexes  Left axis deviation  Abnormal ECG    < end of copied text >      PROTEIN CALORIE MALNUTRITION PRESENT: [ ]mild [ ]moderate [ ]severe [ ]underweight [ ]morbid obesity  https://www.andeal.org/vault/3330/web/files/ONC/Table_Clinical%20Characteristics%20to%20Document%20Malnutrition-White%20JV%20et%20al%202012.pdf    Height (cm): 154.9 (03-24-23 @ 20:51), 154.9 (02-21-23 @ 20:34), 154.9 (01-31-23 @ 20:17)  Weight (kg): 54.4 (03-24-23 @ 20:51), 52.2 (01-24-23 @ 19:41), 59 (12-27-22 @ 19:18)  BMI (kg/m2): 22.7 (03-24-23 @ 20:51), 21.8 (02-21-23 @ 20:34), 21.8 (01-31-23 @ 20:17)    [ ]PPSV2 < or = to 30% [ ]significant weight loss  [ ]poor nutritional intake  [ ]anasarca      [ ]Artificial Nutrition      REFERRALS:   [x ]Chaplaincy  [ ]Hospice  [ ]Child Life  [ x]Social Work  [ ]Case management [ ]Holistic Therapy     Patient discussed with primary medical team MD  Palliative care education provided to patient and/or family    Goals of Care Document:

## 2023-03-31 NOTE — PROGRESS NOTE ADULT - PROBLEM SELECTOR PROBLEM 4
Leukocytosis
Is This A New Presentation, Or A Follow-Up?: Skin Lesions
How Severe Is Your Skin Lesion?: mild
Have Your Skin Lesions Been Treated?: not been treated

## 2023-03-31 NOTE — DISCHARGE NOTE NURSING/CASE MANAGEMENT/SOCIAL WORK - NSDCPEFALRISK_GEN_ALL_CORE
For information on Fall & Injury Prevention, visit: https://www.[email or web address]/news/fall-prevention-protects-and-maintains-health-and-mobility OR  https://www.[email or web address]/news/fall-prevention-tips-to-avoid-injury OR  https://www.cdc.gov/steadi/patient.html

## 2023-03-31 NOTE — PROGRESS NOTE ADULT - SUBJECTIVE AND OBJECTIVE BOX
Hospital day # : 7d  Events Overnight: none    Subjective: unresponsive    Physical Exam:  General: very ill appearing elderly female  Head: atraumatic, normocephalic  Eyes: EOMI, no icterus  Lungs/Chest: no labored breathing, no tachypnea  Heart: regular rate, regular rhythm, S1/S2  Abdomen: BS audible, non-distended, soft, non-tender  Extremities: no clubbing, no cyanosis, no edema  Neuro: unresponsive to sternal rub     Recent data:  X      Assessment/Plan:  89F with h/o chronic anemia, HTN, DLD from NH p/w anemia on outpatient labwork. Initially p/w anemia seen on outpatient labs. DELANO positive (however possibly false positive i/s/o PO iron use). Has been to the ED multiple times over past several months for anemia requiring blood transfusions. Previously seen by heme-onc on prior admission, but recommended against further w/u for chronic anemia given pt's advanced age. Admitted to telemetry for ACS r/o and anemia.    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 Palliative on board, appreciated. Pleasure feeds as tolerated. Hospice as appropriate. Expect to pass this admission.

## 2023-04-01 LAB
CULTURE RESULTS: SIGNIFICANT CHANGE UP
CULTURE RESULTS: SIGNIFICANT CHANGE UP
SPECIMEN SOURCE: SIGNIFICANT CHANGE UP
SPECIMEN SOURCE: SIGNIFICANT CHANGE UP

## 2023-04-05 NOTE — ED PROVIDER NOTE - PHYSICAL EXAMINATION
Gen: NAD, AOx3  Head: NCAT  HEENT: PERRL, oral mucosa moist, normal conjunctiva, oropharynx clear without exudate or erythema  Lung: CTAB, no respiratory distress, no wheezing, rales, rhonchi  CV: normal s1/s2, rrr, Normal perfusion, pulses 2+ throughout  Abd: soft, NTND, no CVA tenderness, melena on DELANO (chaperone Dr. Burrows)   Genitourinary: no pelvic tenderness  MSK: No edema, no visible deformities, full range of motion in all 4 extremities  Neuro: No focal neurologic deficits  Skin: No rash   Psych: normal affect

## 2023-04-05 NOTE — ED PROVIDER NOTE - CARE PLAN
1 Principal Discharge DX:	Hospice care  Secondary Diagnosis:	Acute myeloid leukemia  Secondary Diagnosis:	Acute respiratory failure with hypercapnia

## 2023-04-05 NOTE — ED PROVIDER NOTE - OBJECTIVE STATEMENT
89 yo female with a pmh of htn, hld, and anemia presents from facility for low hgb. pt denies any symptoms including fevers, chill, headache, recent illness/travel, cough, abdominal pain, chest pain, or SOB.

## 2023-04-05 NOTE — ED PROVIDER NOTE - CLINICAL SUMMARY MEDICAL DECISION MAKING FREE TEXT BOX
89 yr old f that presents with abnormal labs. Will give ppi and cta due to concern for LGIB. Transfusion. Admit to medicine.  Labs and EKG were ordered and reviewed.  Imaging was ordered and reviewed by me.  Appropriate medications for patient's presenting complaints were ordered and effects were reassessed.  Patient's records (prior hospital, ED visit, and/or nursing home notes if available) were reviewed.  Additional history was obtained from EMS, family, and/or PCP (where available).  Escalation to admission/observation was considered.   Patient requires inpatient hospitalization - medicine

## 2023-04-05 NOTE — ED PROVIDER NOTE - ATTENDING APP SHARED VISIT CONTRIBUTION OF CARE
89 yr old f w/ pmh as listed who presents with abnormal labs. As per NH records, pt today was noted to have a hgb less than 7 and elevated WBC above 60 and thrombocytopenia. Pt denies any other medical complaints.      VITAL SIGNS: I have reviewed nursing notes and confirm.  CONSTITUTIONAL: non-toxic, well appearing  SKIN: no rash, no petechiae.  EYES:  EOMI, pink conjunctiva, anicteric  ENT: tongue midline, no exudates, MMM  NECK: Supple; no meningismus, no JVD  CARD: RRR, no murmurs, equal radial pulses bilaterally 2+  RESP: CTAB, no respiratory distress  ABD: Soft, non-tender, non-distended, no peritoneal signs, no HSM, no CVA tenderness  : DELANO performed by RICARDO Patten-aydee on exam  EXT: Normal ROM x4. No edema. No calves tenderness    a/p 89 yr old f that presents with abnormal labs. Will give ppi and cta due to concern for LGIB. Transfusion. Admit to medicine. Of note, there is delay in documentation due to system downtime.    89 yr old f w/ pmh as listed who presents with abnormal labs. As per NH records, pt today was noted to have a hgb less than 7 and elevated WBC above 60 and thrombocytopenia. Pt denies any other medical complaints.      VITAL SIGNS: I have reviewed nursing notes and confirm.  CONSTITUTIONAL: non-toxic, well appearing  SKIN: no rash, no petechiae.  EYES:  EOMI, pink conjunctiva, anicteric  ENT: tongue midline, no exudates, MMM  NECK: Supple; no meningismus, no JVD  CARD: RRR, no murmurs, equal radial pulses bilaterally 2+  RESP: CTAB, no respiratory distress  ABD: Soft, non-tender, non-distended, no peritoneal signs, no HSM, no CVA tenderness  : DELANO performed by RICARDO Chacon on exam  EXT: Normal ROM x4. No edema. No calves tenderness    a/p 89 yr old f that presents with abnormal labs. Will give ppi and cta due to concern for LGIB. Transfusion. Admit to medicine.

## 2023-04-06 DIAGNOSIS — I10 ESSENTIAL (PRIMARY) HYPERTENSION: ICD-10-CM

## 2023-04-06 DIAGNOSIS — I11.0 HYPERTENSIVE HEART DISEASE WITH HEART FAILURE: ICD-10-CM

## 2023-04-06 DIAGNOSIS — G92.8 OTHER TOXIC ENCEPHALOPATHY: ICD-10-CM

## 2023-04-06 DIAGNOSIS — Z88.2 ALLERGY STATUS TO SULFONAMIDES: ICD-10-CM

## 2023-04-06 DIAGNOSIS — L89.152 PRESSURE ULCER OF SACRAL REGION, STAGE 2: ICD-10-CM

## 2023-04-06 DIAGNOSIS — Z66 DO NOT RESUSCITATE: ICD-10-CM

## 2023-04-06 DIAGNOSIS — D53.9 NUTRITIONAL ANEMIA, UNSPECIFIED: ICD-10-CM

## 2023-04-06 DIAGNOSIS — Z51.5 ENCOUNTER FOR PALLIATIVE CARE: ICD-10-CM

## 2023-04-06 DIAGNOSIS — Z79.82 LONG TERM (CURRENT) USE OF ASPIRIN: ICD-10-CM

## 2023-04-06 DIAGNOSIS — E88.3 TUMOR LYSIS SYNDROME: ICD-10-CM

## 2023-04-06 DIAGNOSIS — K92.1 MELENA: ICD-10-CM

## 2023-04-06 DIAGNOSIS — E78.5 HYPERLIPIDEMIA, UNSPECIFIED: ICD-10-CM

## 2023-04-06 DIAGNOSIS — C92.00 ACUTE MYELOBLASTIC LEUKEMIA, NOT HAVING ACHIEVED REMISSION: ICD-10-CM

## 2023-04-06 DIAGNOSIS — J96.01 ACUTE RESPIRATORY FAILURE WITH HYPOXIA: ICD-10-CM

## 2023-06-01 NOTE — ED PROVIDER NOTE - NSTIMEPROVIDERCAREINITIATE_GEN_ER
ASSUMED CARE, RECEIVED REPORT FROM Dolly BYRD. Beside report received. Call light at reach vs updated. No further needs at this moment. 19-Aug-2022 22:11

## 2023-08-11 NOTE — ED ADULT TRIAGE NOTE - CHIEF COMPLAINT QUOTE
Post-Care Instructions: I reviewed with the patient in detail post-care instructions. Patient is to keep the biopsy site dry overnight, and then apply bacitracin twice daily until healed. Patient may apply hydrogen peroxide soaks to remove any crusting. PT sent in from [place] for blood transfusion. PT hgb was 6.4. Pt here frequently for blood transfusions. No pain noted

## 2024-03-20 NOTE — ED ADULT NURSE NOTE - NS_SISCREENINGSR_GEN_ALL_ED
Initiate Treatment: Epsolay cream apply on the face in the morning.\\nMetronidazole cream apply on the face at night\\nDoxycycline 20mg take 1 by mouth twice a day take with food Render In Strict Bullet Format?: No Detail Level: Zone Negative

## 2024-05-23 NOTE — ED PROVIDER NOTE - IV ALTEPLASE EXCL ABS HIDDEN
Per Dr Evans;  Patient was advised to contact the office to schedule 4 week follow-up appointment once she has picked up Zepbound medication  
show

## 2025-02-10 NOTE — PATIENT PROFILE ADULT - FOOD INSECURITY
"Daily Note     Today's date: 2/10/2025  Patient name: Gabriel Raza  : 1978  MRN: [id number]  Referring provider: Silvio Smith PT  Dx: No diagnosis found.               Subjective: ***      Objective: See treatment diary below      Assessment: Tolerated treatment {Tolerated treatment :[id number]}. Patient {assessment:}      Plan: {PLAN:[id number]}     Precautions: none    Manuals  2/4        B ankle mobs               B subtalar mobs               L forefoot mob               EPAT               B calf/ plantar fascia mx work  SH R MM R   SK R  MK R       Neuro Re-Ed          Toe intrinsic circuit                sliders 3way  10x 10x  10x  -                        Ther Ex          Prostretch               Functional soleus stretch 20\"x5  20\"x5  20\"x5   5x 20\" holds         Calf raises 145# 3x10   145# 3x10    145# 3x10  3x12 145 lbs       Self calcaneal mob                plantar fascia stretch  30\"x3 R 30\"x3 R  30\"x3 R  3x30\" holds R         bike  5' L3 5' L3  5' L3   5 min       Ankle 4way                hamstring stretch/nerve glide 20 ankle pumps 3x  20 ankle pumps 3x  20 ankle pumps 3x    completed                                                "
no

## 2025-03-05 NOTE — DISCHARGE NOTE PROVIDER - DISCHARGE SERVICE FOR PATIENT
Left message for pt to return call.   Sent [email or web address] message    on the discharge service for the patient. I have reviewed and made amendments to the documentation where necessary.